# Patient Record
Sex: MALE | Race: WHITE | Employment: FULL TIME | URBAN - METROPOLITAN AREA
[De-identification: names, ages, dates, MRNs, and addresses within clinical notes are randomized per-mention and may not be internally consistent; named-entity substitution may affect disease eponyms.]

---

## 2017-06-29 PROBLEM — R74.8 LOW SERUM HDL: Status: ACTIVE | Noted: 2017-06-29

## 2021-07-23 ENCOUNTER — INITIAL CONSULT (OUTPATIENT)
Dept: BARIATRICS/WEIGHT MGMT | Age: 41
End: 2021-07-23
Payer: COMMERCIAL

## 2021-07-23 ENCOUNTER — TELEPHONE (OUTPATIENT)
Dept: BARIATRICS/WEIGHT MGMT | Age: 41
End: 2021-07-23

## 2021-07-23 VITALS
BODY MASS INDEX: 40.43 KG/M2 | HEIGHT: 74 IN | TEMPERATURE: 97.2 F | HEART RATE: 66 BPM | DIASTOLIC BLOOD PRESSURE: 81 MMHG | RESPIRATION RATE: 20 BRPM | WEIGHT: 315 LBS | SYSTOLIC BLOOD PRESSURE: 156 MMHG

## 2021-07-23 DIAGNOSIS — K30 INDIGESTION: ICD-10-CM

## 2021-07-23 DIAGNOSIS — E66.01 MORBID OBESITY DUE TO EXCESS CALORIES (HCC): Primary | ICD-10-CM

## 2021-07-23 DIAGNOSIS — K21.9 GASTROESOPHAGEAL REFLUX DISEASE WITHOUT ESOPHAGITIS: ICD-10-CM

## 2021-07-23 PROCEDURE — 99204 OFFICE O/P NEW MOD 45 MIN: CPT | Performed by: SURGERY

## 2021-07-23 PROCEDURE — 99202 OFFICE O/P NEW SF 15 MIN: CPT

## 2021-07-23 NOTE — PROGRESS NOTES
Todd Poole  7/23/2021  [facility]    Initial Evaluation  Bariatric Surgery and EGD       Subjective:  CHIEF COMPLAINT: Morbid obesity, malnutrition, Hypertension, GERD and Degenerative Joint Disease (DJD)    HISTORY OF PRESENT ILLNESS: Todd Poole is a morbidly-obese 39 y.o.  male, who weighs (!) 434 lb (196.9 kg). He is 230 pounds over his ideal body weight. The severity is severe with Body mass index is 55.72 kg/m². He has multiple medical problems aggravated by his obesity. They have been overweight since age 22. He wishes to have bariatric surgery so that he can lose a large amount of weight to a goal of their ideal body weight based on height, build and sex, and keep the weight off. I have met with him in the [facility] where we discussed the surgery in great detail and went over the risks and benefits. He has watched our informational video so he understands all of the extensive risks involved. He states that he understands all of the risks and wishes to proceed with the evaluation. We have discussed the different surgical options in detail with use of diagrams and drawings to detail the procedures, risks and alternatives. We discussed the postoperative diet and proposed life long diet for weight management after surgery. They are a nonsmoker  They have no significant exposure to second hand smoke    Past Medical History  Patient Active Problem List   Diagnosis    Irritable bowel syndrome    Obesity    Tobacco use    Prehypertension    Low serum HDL     Past Surgical History  Past Surgical History:   Procedure Laterality Date    COLONOSCOPY  2009    normal      Allergies: Patient has no known allergies. Home Medications  No current outpatient medications on file. No current facility-administered medications for this visit. Social History:   TOBACCO:   reports that he quit smoking about a year ago.  His smoking use included cigarettes. He has a 6.00 pack-year smoking history. He has never used smokeless tobacco.  All smokers must join the free smoking cessation program and stop smoking for 3 months before having any Bariatric surgery. ETOH:    reports no history of alcohol use. The patient has a family history that is negative for severe cardiovascular or respiratory issues, negative for reaction to anesthesia. Review of Systems    A complete 10 system review was performed and are otherwise negative unless mentioned in the above HPI. Specific negatives are listed below but may not include all those reviewed.     General ROS: negative obtundation, AMS  ENT ROS: negative rhinorrhea, epistaxis  Allergy and Immunology ROS: negative itchy/watery eyes or nasal congestion  Hematological and Lymphatic ROS: negative spontaneous bleeding or bruising  Endocrine ROS: negative  lethargy, mood swings, palpitations or polydipsia/polyuria  Respiratory ROS: negative sputum changes, stridor, tachypnea or wheezing  Cardiovascular ROS: negative for - loss of consciousness, murmur or orthopnea  Gastrointestinal ROS: negative for - hematochezia or hematemesis  Genito-Urinary ROS: negative for -  genital discharge or hematuria  Musculoskeletal ROS: negative for - focal weakness, gangrene  Psych/Neuro ROS: negative for - visual or auditory hallucinations, suicidal ideation      Physical Exam:   VITALS: BP (!) 156/81 (Site: Left Lower Arm, Position: Sitting, Cuff Size: Large Adult)   Pulse 66   Temp 97.2 °F (36.2 °C) (Temporal)   Resp 20   Ht 6' 2\" (1.88 m)   Wt (!) 434 lb (196.9 kg)   BMI 55.72 kg/m²   General appearance: AAO, NAD  Eyes: PERRL, EOMI, red conjunctiva  Lungs: equal chest rise bilateral, no wheeze, no rhonchi  Chest wall: atraumatic, no tenderness, no echymosis or abrasions  Heart: reg rate, no murmur  Abdomen: soft, nondistended, nontender, obese  Extremities: full ROM all 4 ext, no gross motor or sensory deficits  Pulses: 2+ distal  Skin: warm and dry  Neurologic: spontanous eye opening, purposeful, follows complex commands      Assessment:  Morbid obesity with inability to keep the weight off with diet and exercise. He is interested in Laparoscopic Devika-en- Y Gastric Bypass or Sleeve Gastrectomy. We discussed that our goal is to ameliorate the medical problems and not to obtain a specific body mass index. He understands the risks and benefits, and wishes to proceed with the evaluation. Plan:  Psych evaluation, medical, gallbladder ultrasound. These patients often have vitamin deficiencies so I will do screening labs for malnutrition. I will do an upper endoscopy to check for hiatal hernias, ulcers and H. Pylori while we wait for insurance approval for the surgery. I have spent over 45min in evaluation of the patient including greater than 50% of that time face to face discussing surgical options, medical and surgical history, plans for medical weight loss management and preoperative clearance for surgery. They did have family present for the discussion and visual aides and drawings were used to explain anatomy and surgical procedures.      Physician Signature: Electronically signed by Dr. Garrison Bettencourt MD    Send copy of H&P to PCP, Sandra Bonner MD

## 2021-07-23 NOTE — TELEPHONE ENCOUNTER
Prior Authorization Form  DEMOGRAPHICS:    Patient Name:  Sarahi Headley  Patient :  1980            Insurance:  Payor: [id number]. / Plan: [id number]. / Product Type: *No Product type* /   Insurance ID Number:    Payor/Plan Subscr  Sex Relation Sub. Ins. ID Effective Group Num   1Marvin Still 1980 Male Self [id number]                                   [id number]         DIAGNOSIS & PROCEDURE:    Procedure/Operation: egd           CPT Code: 33398    Diagnosis:  gerd    ICD10 Code: k21.9    Location:  [facility]    Surgeon:  Alecia Ríos INFORMATION:    Date: 21    Time:               Anesthesia:  MAC/TIVA                                                       Status:  Outpatient        Special Comments:         Electronically signed by Sabrina Medina MA on 2021 at 1:41 PM

## 2021-08-23 NOTE — PROGRESS NOTES
[facility]                                                                                                                    PRE OP INSTRUCTIONS FOR  Bailey Barthel        Date: 8/23/2021    Date of surgery: 08/23/21   Arrival Time: Hospital will call you between 5pm and 7pm with your final arrival time for surgery    1. Do not eat or drink anything after midnight prior to surgery. This includes no water, chewing gum, mints or ice chips. 2. Take the following medications with a small sip of water on the morning of Surgery: None    3. Diabetics may take evening dose of insulin but none after midnight. If you feel symptomatic or low blood sugar morning of surgery drink 1-2 ounces of apple juice only. 4. Aspirin, Ibuprofen, Advil, Naproxen, Vitamin E and other Anti-inflammatory products should be stopped  before surgery  as directed by your physician. Take Tylenol only unless instructed otherwise by your surgeon. 5. Check with your Doctor regarding stopping Plavix, Coumadin, Lovenox, Eliquis, Effient, or other blood thinners. 6. Do not smoke,use illicit drugs and do not drink any alcoholic beverages 24 hours prior to surgery. 7. You may brush your teeth the morning of surgery. DO NOT SWALLOW WATER    8. You MUST make arrangements for a responsible adult to take you home after your surgery. You will not be allowed to leave alone or drive yourself home. It is strongly suggested someone stay with you the first 24 hrs. Your surgery will be cancelled if you do not have a ride home. 9. PEDIATRIC PATIENTS ONLY:  A parent/legal guardian must accompany a child scheduled for surgery and plan to stay at the hospital until the child is discharged. Please do not bring other children with you.     10. Please wear simple, loose fitting clothing to the hospital.  Oumar Byersr not bring valuables (money, credit cards, checkbooks, etc.) Do not wear any makeup (including no eye makeup) or nail polish on your fingers or toes. 11. DO NOT wear any jewelry or piercings on day of surgery. All body piercing jewelry must be removed. 12. Shower the night before surgery with _x__Antibacterial soap /CRISTELA WIPES________    13. TOTAL JOINT REPLACEMENT/HYSTERECTOMY PATIENTS ONLY---Remember to bring Blood Bank bracelet to the hospital on the day of surgery. 14. If you have a Living Will and Durable Power of  for Healthcare, please bring in a copy. 15. If appropriate bring crutches, inspirex, WALKER, CANE etc... 12. Notify your Surgeon if you develop any illness between now and surgery time, cough, cold, fever, sore throat, nausea, vomiting, etc.  Please notify your surgeon if you experience dizziness, shortness of breath or blurred vision between now & the time of your surgery. 17. If you have ___dentures, they will be removed before going to the OR; we will provide you a container. If you wear ___contact lenses or ___glasses, they will be removed; please bring a case for them. 18. To provide excellent care visitors will be limited to 1 in the room at any given time. 19. Please bring picture ID and insurance card. 20. Sleep apnea patients need to bring CPAP AND SETTINGS to hospital on day of surgery. 21. During flu season no children under the age of 15 are permitted in the hospital for the safety of all patients. 22. Other Please check in at main lobby/information desk, please wear a mask. Please call AMBULATORY CARE if you have any further questions.    [address]     [address]

## 2021-08-24 ENCOUNTER — ANESTHESIA EVENT (OUTPATIENT)
Dept: ENDOSCOPY | Age: 41
End: 2021-08-24
Payer: COMMERCIAL

## 2021-08-24 ENCOUNTER — HOSPITAL ENCOUNTER (OUTPATIENT)
Age: 41
Setting detail: OUTPATIENT SURGERY
Discharge: HOME | End: 2021-08-24
Attending: SURGERY | Admitting: SURGERY
Payer: COMMERCIAL

## 2021-08-24 ENCOUNTER — HOSPITAL ENCOUNTER (OUTPATIENT)
Dept: ULTRASOUND IMAGING | Age: 41
Discharge: HOME | End: 2021-08-24
Payer: COMMERCIAL

## 2021-08-24 ENCOUNTER — ANESTHESIA (OUTPATIENT)
Dept: ENDOSCOPY | Age: 41
End: 2021-08-24
Payer: COMMERCIAL

## 2021-08-24 VITALS — RESPIRATION RATE: 19 BRPM | OXYGEN SATURATION: 91 %

## 2021-08-24 VITALS
HEART RATE: 68 BPM | RESPIRATION RATE: 20 BRPM | DIASTOLIC BLOOD PRESSURE: 72 MMHG | WEIGHT: 315 LBS | TEMPERATURE: 97.7 F | SYSTOLIC BLOOD PRESSURE: 141 MMHG | HEIGHT: 74 IN | OXYGEN SATURATION: 97 % | BODY MASS INDEX: 40.43 KG/M2

## 2021-08-24 DIAGNOSIS — E66.01 MORBID OBESITY DUE TO EXCESS CALORIES (HCC): ICD-10-CM

## 2021-08-24 DIAGNOSIS — K30 INDIGESTION: ICD-10-CM

## 2021-08-24 LAB
ALBUMIN SERPL-MCNC: 3.8 G/DL (ref 3.5–5.2)
ALP BLD-CCNC: 80 U/L (ref 40–129)
ALT SERPL-CCNC: 28 U/L (ref 0–40)
ANION GAP SERPL CALCULATED.3IONS-SCNC: 8 MMOL/L (ref 7–16)
AST SERPL-CCNC: 16 U/L (ref 0–39)
BILIRUB SERPL-MCNC: 0.5 MG/DL (ref 0–1.2)
BUN BLDV-MCNC: 13 MG/DL (ref 6–20)
CALCIUM SERPL-MCNC: 9 MG/DL (ref 8.6–10.2)
CHLORIDE BLD-SCNC: 104 MMOL/L (ref 98–107)
CHOLESTEROL, TOTAL: 145 MG/DL (ref 0–199)
CO2: 26 MMOL/L (ref 22–29)
CREAT SERPL-MCNC: 0.9 MG/DL (ref 0.7–1.2)
FERRITIN: 171 NG/ML
FOLATE: 5.8 NG/ML (ref 4.8–24.2)
GFR AFRICAN AMERICAN: >60
GFR NON-AFRICAN AMERICAN: >60 ML/MIN/1.73
GLUCOSE BLD-MCNC: 116 MG/DL (ref 74–99)
HBA1C MFR BLD: 5.6 % (ref 4–5.6)
HCT VFR BLD CALC: 46.5 % (ref 37–54)
HEMOGLOBIN: 15.1 G/DL (ref 12.5–16.5)
MCH RBC QN AUTO: 29.9 PG (ref 26–35)
MCHC RBC AUTO-ENTMCNC: 32.5 % (ref 32–34.5)
MCV RBC AUTO: 92.1 FL (ref 80–99.9)
PDW BLD-RTO: 12.7 FL (ref 11.5–15)
PLATELET # BLD: 231 E9/L (ref 130–450)
PMV BLD AUTO: 10.5 FL (ref 7–12)
POTASSIUM SERPL-SCNC: 4 MMOL/L (ref 3.5–5)
RBC # BLD: 5.05 E12/L (ref 3.8–5.8)
SODIUM BLD-SCNC: 138 MMOL/L (ref 132–146)
TOTAL PROTEIN: 6.6 G/DL (ref 6.4–8.3)
TRIGL SERPL-MCNC: 74 MG/DL (ref 0–149)
VITAMIN B-12: 309 PG/ML (ref 211–946)
WBC # BLD: 6.9 E9/L (ref 4.5–11.5)

## 2021-08-24 PROCEDURE — 82746 ASSAY OF FOLIC ACID SERUM: CPT

## 2021-08-24 PROCEDURE — 76705 ECHO EXAM OF ABDOMEN: CPT

## 2021-08-24 PROCEDURE — 85027 COMPLETE CBC AUTOMATED: CPT

## 2021-08-24 PROCEDURE — 7100000011 HC PHASE II RECOVERY - ADDTL 15 MIN: Performed by: SURGERY

## 2021-08-24 PROCEDURE — 82465 ASSAY BLD/SERUM CHOLESTEROL: CPT

## 2021-08-24 PROCEDURE — 82607 VITAMIN B-12: CPT

## 2021-08-24 PROCEDURE — 2580000003 HC RX 258: Performed by: SURGERY

## 2021-08-24 PROCEDURE — 43239 EGD BIOPSY SINGLE/MULTIPLE: CPT | Performed by: SURGERY

## 2021-08-24 PROCEDURE — 2500000003 HC RX 250 WO HCPCS: Performed by: NURSE ANESTHETIST, CERTIFIED REGISTERED

## 2021-08-24 PROCEDURE — 84478 ASSAY OF TRIGLYCERIDES: CPT

## 2021-08-24 PROCEDURE — 2580000003 HC RX 258: Performed by: NURSE ANESTHETIST, CERTIFIED REGISTERED

## 2021-08-24 PROCEDURE — 88342 IMHCHEM/IMCYTCHM 1ST ANTB: CPT

## 2021-08-24 PROCEDURE — 83036 HEMOGLOBIN GLYCOSYLATED A1C: CPT

## 2021-08-24 PROCEDURE — 3700000000 HC ANESTHESIA ATTENDED CARE: Performed by: SURGERY

## 2021-08-24 PROCEDURE — 84425 ASSAY OF VITAMIN B-1: CPT

## 2021-08-24 PROCEDURE — 3700000001 HC ADD 15 MINUTES (ANESTHESIA): Performed by: SURGERY

## 2021-08-24 PROCEDURE — 80053 COMPREHEN METABOLIC PANEL: CPT

## 2021-08-24 PROCEDURE — 82728 ASSAY OF FERRITIN: CPT

## 2021-08-24 PROCEDURE — 2709999900 HC NON-CHARGEABLE SUPPLY: Performed by: SURGERY

## 2021-08-24 PROCEDURE — 88305 TISSUE EXAM BY PATHOLOGIST: CPT

## 2021-08-24 PROCEDURE — 84630 ASSAY OF ZINC: CPT

## 2021-08-24 PROCEDURE — 3609012400 HC EGD TRANSORAL BIOPSY SINGLE/MULTIPLE: Performed by: SURGERY

## 2021-08-24 PROCEDURE — 6360000002 HC RX W HCPCS: Performed by: NURSE ANESTHETIST, CERTIFIED REGISTERED

## 2021-08-24 PROCEDURE — 7100000010 HC PHASE II RECOVERY - FIRST 15 MIN: Performed by: SURGERY

## 2021-08-24 PROCEDURE — 36415 COLL VENOUS BLD VENIPUNCTURE: CPT

## 2021-08-24 RX ORDER — GLYCOPYRROLATE 0.2 MG/ML
INJECTION INTRAMUSCULAR; INTRAVENOUS PRN
Status: DISCONTINUED | OUTPATIENT
Start: 2021-08-24 | End: 2021-08-24 | Stop reason: SDUPTHER

## 2021-08-24 RX ORDER — SODIUM CHLORIDE 0.9 % (FLUSH) 0.9 %
10 SYRINGE (ML) INJECTION PRN
Status: DISCONTINUED | OUTPATIENT
Start: 2021-08-24 | End: 2021-08-24 | Stop reason: HOSPADM

## 2021-08-24 RX ORDER — SODIUM CHLORIDE 0.9 % (FLUSH) 0.9 %
10 SYRINGE (ML) INJECTION EVERY 12 HOURS SCHEDULED
Status: DISCONTINUED | OUTPATIENT
Start: 2021-08-24 | End: 2021-08-24 | Stop reason: HOSPADM

## 2021-08-24 RX ORDER — SODIUM CHLORIDE 9 MG/ML
INJECTION, SOLUTION INTRAVENOUS CONTINUOUS
Status: DISCONTINUED | OUTPATIENT
Start: 2021-08-24 | End: 2021-08-24 | Stop reason: HOSPADM

## 2021-08-24 RX ORDER — MIDAZOLAM HYDROCHLORIDE 1 MG/ML
INJECTION INTRAMUSCULAR; INTRAVENOUS PRN
Status: DISCONTINUED | OUTPATIENT
Start: 2021-08-24 | End: 2021-08-24 | Stop reason: SDUPTHER

## 2021-08-24 RX ORDER — PROPOFOL 10 MG/ML
INJECTION, EMULSION INTRAVENOUS PRN
Status: DISCONTINUED | OUTPATIENT
Start: 2021-08-24 | End: 2021-08-24 | Stop reason: SDUPTHER

## 2021-08-24 RX ORDER — KETAMINE HYDROCHLORIDE 50 MG/ML
INJECTION, SOLUTION, CONCENTRATE INTRAMUSCULAR; INTRAVENOUS PRN
Status: DISCONTINUED | OUTPATIENT
Start: 2021-08-24 | End: 2021-08-24 | Stop reason: SDUPTHER

## 2021-08-24 RX ORDER — SODIUM CHLORIDE 9 MG/ML
INJECTION, SOLUTION INTRAVENOUS CONTINUOUS PRN
Status: DISCONTINUED | OUTPATIENT
Start: 2021-08-24 | End: 2021-08-24 | Stop reason: SDUPTHER

## 2021-08-24 RX ORDER — SODIUM CHLORIDE 9 MG/ML
25 INJECTION, SOLUTION INTRAVENOUS PRN
Status: DISCONTINUED | OUTPATIENT
Start: 2021-08-24 | End: 2021-08-24 | Stop reason: HOSPADM

## 2021-08-24 RX ADMIN — KETAMINE HYDROCHLORIDE 20 MG: 50 INJECTION INTRAMUSCULAR; INTRAVENOUS at 09:03

## 2021-08-24 RX ADMIN — GLYCOPYRROLATE 0.2 MG: 0.2 INJECTION, SOLUTION INTRAMUSCULAR; INTRAVENOUS at 08:50

## 2021-08-24 RX ADMIN — SODIUM CHLORIDE: 9 INJECTION, SOLUTION INTRAVENOUS at 08:50

## 2021-08-24 RX ADMIN — SODIUM CHLORIDE: 9 INJECTION, SOLUTION INTRAVENOUS at 07:51

## 2021-08-24 RX ADMIN — PROPOFOL 50 MG: 10 INJECTION, EMULSION INTRAVENOUS at 09:03

## 2021-08-24 RX ADMIN — MIDAZOLAM 2 MG: 1 INJECTION INTRAMUSCULAR; INTRAVENOUS at 09:03

## 2021-08-24 ASSESSMENT — PAIN SCALES - GENERAL
PAINLEVEL_OUTOF10: 0
PAINLEVEL_OUTOF10: 0

## 2021-08-24 ASSESSMENT — LIFESTYLE VARIABLES: SMOKING_STATUS: 0

## 2021-08-24 NOTE — ANESTHESIA POSTPROCEDURE EVALUATION
Department of Anesthesiology  Postprocedure Note    Patient: Moriah Land  MRN: [id number]  YOB: 1980  Date of evaluation: 8/24/2021  Time:  12:10 PM     Procedure Summary     Date: 08/24/21 Room / Location: [address] [place] [address] / [address] [place] [address]    Anesthesia Start: 8801 Anesthesia Stop: 9468    Procedure: EGD BIOPSY (N/A ) Diagnosis: (GERD)    Surgeons: Carito White MD Responsible Provider: Armand Yepez MD    Anesthesia Type: MAC ASA Status: 3          Anesthesia Type: MAC    Orestes Phase I: Orestes Score: 10    Orestes Phase II: Orestes Score: 10    Last vitals: Reviewed and per EMR flowsheets.        Anesthesia Post Evaluation    Patient location during evaluation: PACU  Patient participation: complete - patient participated  Level of consciousness: awake  Airway patency: patent  Nausea & Vomiting: no nausea and no vomiting  Complications: no  Cardiovascular status: hemodynamically stable  Respiratory status: acceptable  Hydration status: stable

## 2021-08-24 NOTE — H&P
Anselmo Foy  8/24/2021  [facility]    Initial Evaluation  Bariatric Surgery and EGD       Subjective:  CHIEF COMPLAINT: Morbid obesity, malnutrition, Hypertension, GERD and Degenerative Joint Disease (DJD)    HISTORY OF PRESENT ILLNESS: Anselmo Foy is a morbidly-obese 39 y.o.  male, who weighs (!) 434 lb (196.9 kg). He is 230 pounds over his ideal body weight. The severity is severe with Body mass index is 56.13 kg/m². He has multiple medical problems aggravated by his obesity. They have been overweight since age 22. He wishes to have bariatric surgery so that he can lose a large amount of weight to a goal of their ideal body weight based on height, build and sex, and keep the weight off. I have met with him in the [facility] where we discussed the surgery in great detail and went over the risks and benefits. He has watched our informational video so he understands all of the extensive risks involved. He states that he understands all of the risks and wishes to proceed with the evaluation. We have discussed the different surgical options in detail with use of diagrams and drawings to detail the procedures, risks and alternatives. We discussed the postoperative diet and proposed life long diet for weight management after surgery. They are a nonsmoker  They have no significant exposure to second hand smoke    Past Medical History  Patient Active Problem List   Diagnosis    Irritable bowel syndrome    Obesity    Tobacco use    Prehypertension    Low serum HDL     Past Surgical History  Past Surgical History:   Procedure Laterality Date    COLONOSCOPY  2009    normal      Allergies: Patient has no known allergies.      Home Medications  Current Facility-Administered Medications   Medication Dose Route Frequency Provider Last Rate Last Admin    0.9 % sodium chloride infusion   Intravenous Continuous Paulina Primrose, MD        sodium chloride flush 0.9 % injection 10 mL  10 mL Intravenous 2 times per day Ignacio Dempsey MD        sodium chloride flush 0.9 % injection 10 mL  10 mL Intravenous PRN Ignacio Dempsey MD        0.9 % sodium chloride infusion  25 mL Intravenous PRN Ignacio Dempsey MD           Social History:   TOBACCO:   reports that he quit smoking about 12 months ago. His smoking use included cigarettes. He has a 6.00 pack-year smoking history. He has never used smokeless tobacco.  All smokers must join the free smoking cessation program and stop smoking for 3 months before having any Bariatric surgery. ETOH:    reports no history of alcohol use. The patient has a family history that is negative for severe cardiovascular or respiratory issues, negative for reaction to anesthesia. Review of Systems    A complete 10 system review was performed and are otherwise negative unless mentioned in the above HPI. Specific negatives are listed below but may not include all those reviewed.     General ROS: negative obtundation, AMS  ENT ROS: negative rhinorrhea, epistaxis  Allergy and Immunology ROS: negative itchy/watery eyes or nasal congestion  Hematological and Lymphatic ROS: negative spontaneous bleeding or bruising  Endocrine ROS: negative  lethargy, mood swings, palpitations or polydipsia/polyuria  Respiratory ROS: negative sputum changes, stridor, tachypnea or wheezing  Cardiovascular ROS: negative for - loss of consciousness, murmur or orthopnea  Gastrointestinal ROS: negative for - hematochezia or hematemesis  Genito-Urinary ROS: negative for -  genital discharge or hematuria  Musculoskeletal ROS: negative for - focal weakness, gangrene  Psych/Neuro ROS: negative for - visual or auditory hallucinations, suicidal ideation      Physical Exam:   VITALS: /88   Pulse 61   Temp 97.2 °F (36.2 °C) (Temporal)   Resp 16   Ht 6' 2\" (1.88 m)   Wt (!) 437 lb 3.2 oz (198.3 kg)   SpO2 95%   BMI 56.13 kg/m²   General appearance: AAO, NAD  Eyes: PERRL, EOMI, red conjunctiva  Lungs: equal chest rise bilateral, no wheeze, no rhonchi  Chest wall: atraumatic, no tenderness, no echymosis or abrasions  Heart: reg rate, no murmur  Abdomen: soft, nondistended, nontender, obese  Extremities: full ROM all 4 ext, no gross motor or sensory deficits  Pulses: 2+ distal  Skin: warm and dry  Neurologic: spontanous eye opening, purposeful, follows complex commands      Assessment:  Morbid obesity with inability to keep the weight off with diet and exercise. He is interested in Laparoscopic Devika-en- Y Gastric Bypass or Sleeve Gastrectomy. We discussed that our goal is to ameliorate the medical problems and not to obtain a specific body mass index. He understands the risks and benefits, and wishes to proceed with the evaluation. Plan:  Psych evaluation, medical, gallbladder ultrasound. These patients often have vitamin deficiencies so I will do screening labs for malnutrition. I will do an upper endoscopy to check for hiatal hernias, ulcers and H. Pylori while we wait for insurance approval for the surgery. I have spent over 45min in evaluation of the patient including greater than 50% of that time face to face discussing surgical options, medical and surgical history, plans for medical weight loss management and preoperative clearance for surgery. They did have family present for the discussion and visual aides and drawings were used to explain anatomy and surgical procedures.      Physician Signature: Electronically signed by Chiara Ruelas MD    Send copy of H&P to PCP, Sandra Crisostomo DO

## 2021-08-24 NOTE — ANESTHESIA PRE PROCEDURE
Department of Anesthesiology  Preprocedure Note       Name:  Ashanti Chavez   Age:  39 y.o.  :  1980                                          MRN:  [id number]         Date:  2021      Surgeon: Delfino Busby):  Sharee Shipman MD    Procedure: Procedure(s):  EGD ESOPHAGOGASTRODUODENOSCOPY    Medications prior to admission:   Prior to Admission medications    Not on File       Current medications:    No current facility-administered medications for this encounter. Allergies:  No Known Allergies    Problem List:    Patient Active Problem List   Diagnosis Code    Irritable bowel syndrome K58.9    Obesity E66.9    Tobacco use Z72.0    Prehypertension R03.0    Low serum HDL R74.8       Past Medical History:        Diagnosis Date    Irritable bowel syndrome     Morbid (severe) obesity due to excess calories (HCC)     Obesity     Prehypertension     Tobacco use        Past Surgical History:        Procedure Laterality Date    COLONOSCOPY      normal       Social History:    Social History     Tobacco Use    Smoking status: Former Smoker     Packs/day: 0.50     Years: 12.00     Pack years: 6.00     Types: Cigarettes     Quit date: 2020     Years since quittin.0    Smokeless tobacco: Never Used   Substance Use Topics    Alcohol use: No                                Counseling given: Not Answered      Vital Signs (Current): There were no vitals filed for this visit.                                            BP Readings from Last 3 Encounters:   21 (!) 156/81   18 118/82   17 115/84       NPO Status:                                                                                 BMI:   Wt Readings from Last 3 Encounters:   21 (!) 434 lb (196.9 kg)   18 (!) 382 lb (173.3 kg)   17 (!) 382 lb (173.3 kg)     There is no height or weight on file to calculate BMI.    CBC: No results found for: WBC, RBC, HGB, HCT, MCV, RDW, PLT    CMP:   Lab Results   Component Value Date     06/23/2017    K 4.4 06/23/2017     06/23/2017    CO2 27 06/23/2017    BUN 11 06/23/2017    CREATININE 0.9 06/23/2017    GFRAA >60 06/23/2017    LABGLOM >60 06/23/2017    GLUCOSE 107 06/23/2017    GLUCOSE 93 07/06/2011    PROT 7.3 06/23/2017    CALCIUM 9.4 06/23/2017    BILITOT 0.8 06/23/2017    ALKPHOS 78 06/23/2017    AST 19 06/23/2017    ALT 23 06/23/2017       POC Tests: No results for input(s): POCGLU, POCNA, POCK, POCCL, POCBUN, POCHEMO, POCHCT in the last 72 hours. Coags: No results found for: PROTIME, INR, APTT    HCG (If Applicable): No results found for: PREGTESTUR, PREGSERUM, HCG, HCGQUANT     ABGs: No results found for: PHART, PO2ART, OMX8YNX, FVI7UDH, BEART, C9LGHMZS     Type & Screen (If Applicable):  No results found for: LABABO, LABRH    Drug/Infectious Status (If Applicable):  No results found for: HIV, HEPCAB    COVID-19 Screening (If Applicable): No results found for: COVID19        Anesthesia Evaluation  Patient summary reviewed no history of anesthetic complications:   Airway: Mallampati: III  TM distance: >3 FB   Neck ROM: full  Mouth opening: > = 3 FB Dental: normal exam         Pulmonary: breath sounds clear to auscultation      (-) not a current smoker                           Cardiovascular:Negative CV ROS              Rate: normal                    Neuro/Psych:   Negative Neuro/Psych ROS              GI/Hepatic/Renal:   (+) GERD:, morbid obesity         ROS comment: Irritable bowel syndrome. Endo/Other: Negative Endo/Other ROS                    Abdominal:   (+) obese,           Vascular: negative vascular ROS. Other Findings:             Anesthesia Plan      MAC     ASA 3       Induction: intravenous. Anesthetic plan and risks discussed with patient. Plan discussed with CRNA.                   Michoacano Chavez MD   8/24/2021

## 2021-08-24 NOTE — OP NOTE
[address] [place] [address] [facility]           Operative Report    DATE OF PROCEDURE: 8/24/2021    Lexiifabrizio Hartman    SURGEON: Mel Carmen MD.     ASSISTANT: none    PREOPERATIVE DIAGNOSES:  GERD    POSTOPERATIVE DIAGNOSES:   (1) no Hiatal Hernia  (2) No Esophagitis  (3) no Gastritis    OPERATION: Dfabjban-taptmr-[id number] with antral biopsies    ANESTHESIA: LMAC    EBL: None    COMPLICATIONS: None. History and consent: This is a 39y.o. year old male who is having GERD. I have discussed with the patient the indication, alternatives, and the possible risks and/or complications of upper endoscopy and the conscious sedation anesthesia. The patient and/or family understands and agrees to proceed. Monitoring and Safety:    The patient was placed on a cardiac monitor and vital signs, pulse oximetry and level of consciousness were continuously evaluated throughout the procedure. The patient was closely monitored until recovery from the medications was complete and the patient had returned to baseline status. Anesthesia was present at all times during the procedure. OPERATIONS: The patient was placed on the table and sedated while blood pressure, pulse and pulse oximetry were continuously monitored by the anesthesia team. A bite block was placed prior to sedation and the patient was placed in the left lateral decubitus position. A lubricated scope was easily passed into the upper esophagus which looked normal. The distal esophagus looked normal. The scope was passed into the stomach and retroflexed. The gastroesophageal junction was at 41cm. There was no hiatal hernia. The scope was passed down toward the pylorus. The antral mucosa looked normal. Biopsy was taken to check for H. pylori.  The scope was then passed through the pylorus into the duodenal bulb which looked normal, then around to the distal duodenum which looked normal, and the scope was then withdrawn. The patient tolerated the procedure well.        Diet: Low fat, low calorie    PLAN:  (1) Follow up in office to discuss pathology, imaging, labs      Further Procedures:  Surgical weight loss pending approval    Physician Signature: Electronically signed by Dr. Anita Woods

## 2021-08-28 LAB
VITAMIN B1 WHOLE BLOOD: 156 NMOL/L (ref 70–180)
ZINC: 75.3 UG/DL (ref 60–120)

## 2021-08-31 ENCOUNTER — INITIAL CONSULT (OUTPATIENT)
Dept: BARIATRICS/WEIGHT MGMT | Age: 41
End: 2021-08-31
Payer: COMMERCIAL

## 2021-08-31 VITALS — HEIGHT: 74 IN | WEIGHT: 315 LBS | BODY MASS INDEX: 40.43 KG/M2

## 2021-08-31 DIAGNOSIS — E66.01 MORBID OBESITY DUE TO EXCESS CALORIES (HCC): ICD-10-CM

## 2021-08-31 DIAGNOSIS — Z71.3 DIETARY COUNSELING: Primary | ICD-10-CM

## 2021-08-31 PROCEDURE — 99999 PR OFFICE/OUTPT VISIT,PROCEDURE ONLY: CPT

## 2021-08-31 NOTE — PROGRESS NOTES
[facility]:  Initial Nutrition Consultation    Today's Date: 8/31/2021  Patients Name:James Hartman     Height: 6' 2\" (1.88 m)   Weight: (!) 436 lb (197.8 kg)   IBW: 197 lbs   %IBW: 221 %  Excess Weight: 239 lbs   BMI: Body mass index is 55.98 kg/m². Subjective Information:   Patient has tried multiple means of weight loss including diet and exercise in the past and has been unable to maintain a healthy weight. Pt states he / she has tried the following Noom, reduced calorie w/ counting. Patients overall goal is to be able to lose weight with diet and exercise by changing lifestyle, habits and maintain a healthy weight for a lifetime. Are your currently Diabetic no  Pre-diabetic no meds    Last Blood Glucose Reading   116 H on 8/24/21   Last HGBA1C 5.6 WNL on 8/24/21    Patient states the following will be the most difficult change after weight loss surgery? Consuming 64 oz of water daily    Most successful diet in the past? Cutting out bread and other high carbohydrate foods  Length of time patient was on the diet listed above? 1-1.5 years  Weight lost on the diet listed above? 60 lbs   Patient stated he / she maintained his / her weight for the following time? 1-1.5 years       Patient takes the following vitamins, minerals and dietary supplements? none    Patient consumes the following beverage daily? Coffee    8/24/21 Pre-Op Labs  Hgb 15.1 WNL, Hct 46.5 WNL, Ferritin 171 WNL      Patient states he / she has the following problems:  no - Eating  no - Chewing  no - Swallowing  no - Nausea  no - Vomiting  yes - Diarrhea    Occasionally  no - Constipation  no - Hair Changes  no - Skin Changes  no - Tongue Texture    Food Allergies: no    Food Intolerances: yes - Lactose intolerance (pt avoids milk and excessive ice cream)    Yes - Past medically assisted weight loss history reviewed.   Yes - Provided education regarding the basic role of nutrition in junction with Bariatric Surgery. Yes - Provided overview of required dietary and behavioral changes pre and post operatively. Yes - Stated / reinforced that changes in dietary behaviors are critical to successful, long term weight Loss. Patient is aware that in order to proceed with bariatric surgery he / she will need to follow a low-fat diet prior to surgery. Patient is aware that bariatric surgery is a lifetime change that will require the patient to follow a low-fat, low-calorie, low-sugar, portion controlled diet with at least 30 minutes of physical activity daily. Patient is aware that certain foods may not be tolerated after bariatric surgery and certain foods will need avoided all together. [address] / LD feels that this patient knowledge / readiness to make appropriate diet / lifestyle changes assessed to be? - Good    RD / LD feels this patients expected adherence for post surgical diet? - Good    Patient was instructed and signed consents on a low-fat diet and required supplementation at initial consult. Comments: Patient is able to verbalize diet concepts for bariatric surgery. Patient is aware diet concepts will be reinforced at 2-hour nutrition education consult. RD / LD will monitor progress.

## 2021-08-31 NOTE — PATIENT INSTRUCTIONS
[facility]  Dietary Initial Appointment Patient Instructions    By: Rina Mcnulty RD / LD  [phone]      At your initial dietary appointment you have received the following information packets:    Please be aware at each visit you have been instructed that in order for your insurance company to approve your surgery you must show a consistent weight loss of 2 lbs or greater at each visit. We can not guarantee an approval by your insurance company we can only provide the information given to us it is up to you the patient to show compliancy to your insurance company. If you do gain weight during your supervised weight loss counseling sessions insurance companies starting in 2018 are denying patients for not showing consistent weight loss results when part of a supervised weight loss counseling program. Pt was instructed on 8/31/21. Pt verbalized understanding. · Low-Fat Diet  - Please be sure to begin your low-fat diet from today's date until your scheduled surgery date. If you are not at goal weight by your history and physical appointment with your surgeon your surgery will be cancelled. · Goal Weight: 411 lbs (BMI of 52. 7)  · Low-Fat Diet Contract - Patient Acknowledge and Signed  · Supplement List - Please be sure to be saving to purchase your 3 month supply of supplements. If you do not bring supplements to your history and physical appointment your surgery will be cancelled. · Supplement Contract - Patient Acknowledge and Signed  · Please be sure to take a daily Multi-vitamin from now until surgery to reduce your incidence of infection. · If your insurance company requires additional dietary counseling you will be scheduled to see the dietitian the following month. ·  If your psychological evaluation is completed and all your dietary requirements for your individual insurance company have been completed you will be submitted to insurance.  Please make sure to check with us to see if we have received your psychological evaluation. Please be aware that any co-pays or deductibles may be requested prior to testing and / or procedures. You will need to schedule a psychological evaluation for weight loss surgery. Patients will be required to complete all psychological testing as required by the psychologist. Patients must follow all of the psychologist's recommendations before weight loss surgery can be scheduled. The evaluation must be done a standard way for weight loss surgery. We strongly recommend that you contact one of our preferred providers listed below to arrange this:    Donna Sethi, [address] [facility]                                                              [address] [place] [address]                                                                                      [phone]     Ioana Velásquez [address] [place] [address]                                                                                                [phone]        Dr. Laura Raymond, PhD                         [facility]. [place]                                                                                              [phone]     You will also need to plan on attending a 2 hour nutrition class at the [facility] prior to your surgery. We will schedule this for you when we schedule your surgery. Please remember to have your labs drawn prior to your scheduled appointment to review the results of your testing. Please remember, that while we will submit your case to insurance for surgery authorization, it is your responsibility to know if your plan covers weight loss surgery and keep up-to-date with changes to your insurance coverage.   We will do everything possible to help you get approved for weight loss surgery, but cannot guarantee an approval. Please note that you will not be submitted to your insurance company until all   pre-operative testing requirements are met. · Please remember you need to schedule to attend one Support Group meeting held at the [facility] before surgery. This one meeting is mandatory. You can attend as many support group meetings before and after surgery. Support group meetings are held at the [facility] from 6:00 - 8:00pm 1st, and 3rd Tuesday of every month. See dates listed below. · Each individual person has his / her own medical requirements that need fulfilled before being able to schedule bariatric surgery . Please finalize these requirements by contacting our Bariatric Nurse at [phone].

## 2021-09-10 ENCOUNTER — INITIAL CONSULT (OUTPATIENT)
Dept: BARIATRICS/WEIGHT MGMT | Age: 41
End: 2021-09-10
Payer: COMMERCIAL

## 2021-09-10 ENCOUNTER — VIRTUAL VISIT (OUTPATIENT)
Dept: BARIATRICS/WEIGHT MGMT | Age: 41
End: 2021-09-10
Payer: COMMERCIAL

## 2021-09-10 VITALS — BODY MASS INDEX: 40.43 KG/M2 | WEIGHT: 315 LBS | HEIGHT: 74 IN

## 2021-09-10 DIAGNOSIS — E66.01 MORBID OBESITY DUE TO EXCESS CALORIES (HCC): ICD-10-CM

## 2021-09-10 DIAGNOSIS — Z71.3 DIETARY COUNSELING: Primary | ICD-10-CM

## 2021-09-10 DIAGNOSIS — E66.01 MORBID OBESITY DUE TO EXCESS CALORIES (HCC): Primary | ICD-10-CM

## 2021-09-10 DIAGNOSIS — K76.0 FATTY LIVER: ICD-10-CM

## 2021-09-10 DIAGNOSIS — Z01.818 PRE-OP EVALUATION: ICD-10-CM

## 2021-09-10 PROCEDURE — 99212 OFFICE O/P EST SF 10 MIN: CPT | Performed by: SURGERY

## 2021-09-10 PROCEDURE — 99999 PR OFFICE/OUTPT VISIT,PROCEDURE ONLY: CPT

## 2021-09-10 NOTE — PROGRESS NOTES
are not underlined. General: chills, fatigue, fever, malaise, night sweats, weight gain,  weight loss. Psychological: anxiety, depression, suicidal ideation, sleep disturbances, behavioral disorder, difficulty concentrating, disorientation, hallucinations, mood swings, obsessive thoughts, physical abuse,  sexual abuse. Ophthalmic: blurry vision, decreased vision, double vision, loss of vision, photophobia, use of corrective device. Ear Nose Throat: hearing loss, tinnitus, phonophobia, sensitivity to smells, vertigo, or vocal changes. Allergy/Immunology: seasonal allergies, watery eyes, itchy eyes, frequent infections. Hematological and Lymphatic: bleeding problems, blood clots, bruising,  yellowing of the skin, swollen lymph nodes. Endocrine:  polydypsia, polyuria, temperature intolerance. Respiratory: cough, shortness of breath, wheezing. Cardiovascular: syncope, chest pain, dyspnea on exertion, edema, irregular heartbeat,  palpitations. Gastrointestinal: abdominal pain, constipation, diarrhea,  decreased appetite, heartburn, hematemesis, melena, nausea, vomiting, stool incontinence, abnormal swallowing. Genito-Urinary: dysuria, hematuria, incontinence, frequency, urgency. Musculoskeletal: joint pain, stiffness, swelling, muscle pain, muscle  tenderness. Neurological: confusion, memory loss, dizziness, gait disturbance, headaches, impaired coordination, decreased balance, numbness/tingling, seizures, speech problems, tremors,weakness. Dermatological:  hair changes, nail changes, pruritu      Physical Exam: Respiratory rate was: normal   General: The patient is in no apparent distress. Body habitus is obese. HEENT: No rhinorrhea, sneezing, yawning, or lacrimation. No scleral icterus or conjunctival injection. SKIN: No piloerection. No track marks. No rash. No erythema or ecchymosis. Psychological: Mood and affect are appropriate. Hygiene appears appropriate.    Cardiovascular: There is no edema. Respiratory: Respirations are regular and unlabored. There is no cyanosis. Gastrointestinal: Soft abdomen, non-tender. Genitourinary: No costovertebral angle tenderness. MSK: Spinal curvatures were normal in standing. Pelvis was level in standing. Active range of motion was full. There was no obvious joint effusion, deformity. Neurologic: Awake, alert and oriented in three planes. Speech is fluent. No facial weakness. Tongue is midline. Hearing is intact for conversation. Pupils are equal and round. Extraocular muscles appear intact during visual tracking. Shoulder shrug symmetric. Sensation: Intact for light touch (patient elicited) in all upper and lower extremity dermatomes. Strength: Functional upper extremity strength testing was observed to be at least antigravity and lower extremity strength testing including heel and toe walking as well as duck walking were intact, unable to manual muscle test given environment. There was no observable atrophy or side to size difference in muscle bulk. Muscle Tendon Reflexes: unable to test given environment. Gait is Normal.   Romberg is negative. No observed abnormal muscle tone. The patient was able to rise from a chair and squat without difficulty. There is no tremor. Due to this being a TeleHealth encounter, evaluation of the following organ systems is limited: Vitals/Constitutional/EENT/Resp/CV/GI//MS/Neuro/Skin/Heme-Lymph-Imm. Assessment:    Moriah Land is a 39 y.o. male who is being evaluated for weight loss surgery. Currently undergoing medical weight loss management. Recently completed labs, RUQ US and Endoscopy with findings of normal. Vitamin deficiency. Plan:   Stay on the high protein, low calorie diet while waiting for insurance approval. Walk as much as possible but keep your legs elevated when sitting. Continue deep breathing exercizes. Aim for 60 gm Protein and 90 oz of liquids per day.   Track protein and fluid intake. Make sure the bowel move daily by taking fiber such as Metamucil. We discussed results and surgery for over 15 minutes. Cont medical weight loss counseling and pursue medical, cardiac clearance as well as Psychological evaluation. Plans to pursue Laparoscopic Devika-en-Y gastric bypass or Sleeve gastrectomy.     Vit D supplementation for 3 months    No referral for Cardiac clearance necessary based on medical history and exam      Physician Signature: Electronically signed by Dr. Freddy Felix MD   Cc:  Genoveva Butterfield,

## 2021-09-10 NOTE — PROGRESS NOTES
WEIGHT:  Weight: (!) 430 lb (195 kg)      Pt was in th office for his/her 2nd weight Loss appointment. Pt is aware he/she must meet his/her pre-op weight loss goal in order to proceed with weight loss surgery. James / Joshua faxed a copy of what was reviewed with the pt to her PCP. Pt verbalized understanding. Rd// Joshua enc the following at today's visit for successful post-surgical Weight Maintenance    1. Weigh yourself daily and record it. 2. Keep documented food records daily   3. Just be more active in day to day routine   4. Higher protein intake and a higher fiber intake. Not a high protein or a high fiber diet just a higher intake. 5.Eliminate empty calorie consumption    James Lewis addressed the following with the pt:  - James Lewis enc pt to comply with nutrition recommendations  - James / Joshua enc Participation in support group meetings  - James Lewis enc pt to go back to maintenance of food records and weight monitoring records   - James Lewis reviewed the importance of adequate sleep and stress management  - James Lewis reviewed nonfood strategies to cope with emotions and stress  - James Lewis encouraged pt to practice the following: Mindful eating: Eating slowly: Focusing   on the eating experience without distraction  - James Lewis enc. pt to pay attention to hunger and fullness  - Rd / Ld enc meal planning  - James Caro pt to chose nutrient dense whole foods instead of soft, high calorie foods  - James / Ld enc not dr Marisela Sabillon large amounts of fluids with or immediately after meals    Portion control ,meal planning and avoiding empty calorie consumption. James / Ld reviewed insurance company weight loss requirement on 9/10/21. Pt verbalized understanding. Please be aware at each visit you have been instructed that in order for your insurance company to approve your surgery you must show a consistent weight loss of 2 lbs or greater at each visit.  We can not guarantee an approval by your insurance company we can only provide the information given to us it is up to you the patient to show compliancy to your insurance company.   If you do gain weight during your supervised weight loss counseling sessions insurance companies starting in 2018 are denying patients for not showing consistent weight loss results when part of a supervised weight loss counseling program.

## 2021-09-16 ENCOUNTER — VIRTUAL VISIT (OUTPATIENT)
Dept: BARIATRICS/WEIGHT MGMT | Age: 41
End: 2021-09-16
Payer: COMMERCIAL

## 2021-09-16 DIAGNOSIS — Z71.3 DIETARY COUNSELING: Primary | ICD-10-CM

## 2021-09-16 DIAGNOSIS — E66.01 MORBID OBESITY DUE TO EXCESS CALORIES (HCC): ICD-10-CM

## 2021-09-16 PROCEDURE — 99999 PR OFFICE/OUTPT VISIT,PROCEDURE ONLY: CPT

## 2021-09-16 NOTE — PROGRESS NOTES
WEIGHT:  428 lbs     Pt was in th office for his/her 3rd weight Loss appointment. Pt is aware he/she must meet his/her pre-op weight loss goal in order to proceed with weight loss surgery. James / Joshua faxed a copy of what was reviewed with the pt to her PCP. Pt verbalized understanding. James// Joshua enc the following at today's visit for successful post-surgical Weight Maintenance    1. Weigh yourself daily and record it. 2. Keep documented food records daily   3. Just be more active in day to day routine   4. Higher protein intake and a higher fiber intake. Not a high protein or a high fiber diet just a higher intake. 5.Eliminate empty calorie consumption    James Lewis addressed the following with the pt:  - Rd  Ld enc pt to comply with nutrition recommendations  - James / Ld enc Participation in support group meetings  - Rd Ld enc pt to go back to maintenance of food records and weight monitoring records   - James Lewis reviewed the importance of adequate sleep and stress management  - James Lewis reviewed nonfood strategies to cope with emotions and stress  - James Lewis encouraged pt to practice the following: Mindful eating: Eating slowly: Focusing   on the eating experience without distraction  - James Lewis enc. pt to pay attention to hunger and fullness  - James / Joshua enc meal planning  - James Whitehead pt to chose nutrient dense whole foods instead of soft, high calorie foods  - James / Joshua enc not dr Abraham Reins large amounts of fluids with or immediately after meals    Portion control ,meal planning and avoiding empty calorie consumption. James / Joshua reviewed insurance company weight loss requirement on 9/16/21. Pt verbalized understanding. Please be aware at each visit you have been instructed that in order for your insurance company to approve your surgery you must show a consistent weight loss of 2 lbs or greater at each visit.  We can not guarantee an approval by your insurance company we can only provide the information given to us it is up to you the patient to show compliancy to your insurance company.   If you do gain weight during your supervised weight loss counseling sessions insurance companies starting in 2018 are denying patients for not showing consistent weight loss results when part of a supervised weight loss counseling program.

## 2021-09-20 ENCOUNTER — OFFICE VISIT (OUTPATIENT)
Dept: CARDIOLOGY CLINIC | Age: 41
End: 2021-09-20
Payer: COMMERCIAL

## 2021-09-20 VITALS
OXYGEN SATURATION: 96 % | DIASTOLIC BLOOD PRESSURE: 78 MMHG | SYSTOLIC BLOOD PRESSURE: 132 MMHG | HEIGHT: 74 IN | HEART RATE: 63 BPM | RESPIRATION RATE: 16 BRPM | BODY MASS INDEX: 40.43 KG/M2 | WEIGHT: 315 LBS

## 2021-09-20 DIAGNOSIS — Z01.810 PREOP CARDIOVASCULAR EXAM: Primary | ICD-10-CM

## 2021-09-20 PROCEDURE — 99202 OFFICE O/P NEW SF 15 MIN: CPT | Performed by: INTERNAL MEDICINE

## 2021-09-20 PROCEDURE — 93000 ELECTROCARDIOGRAM COMPLETE: CPT | Performed by: INTERNAL MEDICINE

## 2021-09-20 RX ORDER — OMEPRAZOLE 10 MG/1
10 CAPSULE, DELAYED RELEASE ORAL DAILY PRN
COMMUNITY
End: 2021-12-08

## 2021-09-20 RX ORDER — VITAMIN B COMPLEX
1000 TABLET ORAL DAILY
COMMUNITY
End: 2022-02-11

## 2021-09-20 ASSESSMENT — ENCOUNTER SYMPTOMS
DIARRHEA: 1
BACK PAIN: 0
NAUSEA: 0
CONSTIPATION: 0
COUGH: 0
WHEEZING: 0
BLOOD IN STOOL: 0
ABDOMINAL PAIN: 0
VOMITING: 0
SHORTNESS OF BREATH: 0

## 2021-09-20 NOTE — PROGRESS NOTES
OUTPATIENT CARDIOLOGY CONSULT    Name: Boone Francisco    Age: 39 y.o. Date of Service: 9/20/2021    Reason for Consultation:   Chief Complaint   Patient presents with    Cardiac Clearance     Consult, per Dr Andrade Cortez, to obtain cardiac clearance for bariatric surgery. Labs 8/24/21. No cardiac complaints. Referring Physician: Tanika Priest DO    History of Present Illness:  77-year-old morbidly obese ex smoker male who is seen today in consultation for preop clearance prior to bariatric surgery. He has history of prehypertension, low HDL and irritable bowel syndrome. Patient smoking a year ago. He used to smoke 1 pack a day for 20 years. He has no family history for premature CAD. He works as a manager at [employer]. He is active. He has been walking 2 miles a day and can go up couple of flights of stairs with no chest discomfort or shortness of breath. He denies palpitations, dizziness or syncope. Denies orthopnea, PND or lower extremity edema. EKG done today revealed sinus rhythm at 63 bpm, left atrial enlargement, left anterior fascicular block and incomplete right bundle branch block. Review of Systems:  Review of Systems   Constitutional: Negative for chills, fatigue and fever. HENT: Negative for nosebleeds. Respiratory: Negative for cough, shortness of breath and wheezing. Patient denies snoring or gasping for air at night. Gastrointestinal: Positive for diarrhea. Negative for abdominal pain, blood in stool, constipation, nausea and vomiting. Heartburn. Genitourinary: Negative for dysuria and hematuria. Musculoskeletal: Negative for back pain, joint swelling and myalgias. Aching. Neurological: Negative for syncope and light-headedness. Psychiatric/Behavioral: The patient is not nervous/anxious.            Past Medical History:  Past Medical History:   Diagnosis Date    Irritable bowel syndrome     Morbid (severe) obesity due to excess calories ([address]     Obesity     Prehypertension     Tobacco use        Past Surgical History:  Past Surgical History:   Procedure Laterality Date    COLONOSCOPY  2009    normal    UPPER GASTROINTESTINAL ENDOSCOPY N/A 2021    EGD BIOPSY performed by Mable Hernandez MD at [facility] END[facility]COPY       Family History:  Family History   Problem Relation Age of Onset    Diabetes Mother     High Blood Pressure Mother     Diabetes Father     Other Father         aneurysm       Social History:  Social History     Socioeconomic History    Marital status:      Spouse name: Not on file    Number of children: Not on file    Years of education: Not on file    Highest education level: Not on file   Occupational History    Not on file   Tobacco Use    Smoking status: Former Smoker     Packs/day: 0.50     Years: 12.00     Pack years: 6.00     Types: Cigarettes     Quit date: 2020     Years since quittin.1    Smokeless tobacco: Never Used   Vaping Use    Vaping Use: Former    Substances: Nicotine   Substance and Sexual Activity    Alcohol use: Not Currently    Drug use: Never    Sexual activity: Yes     Partners: Female   Other Topics Concern    Not on file   Social History Narrative    Drinks 2-3 cups of coffee daily. Social Determinants of Health     Financial Resource Strain:     Difficulty of Paying Living Expenses:    Food Insecurity:     Worried About Running Out of Food in the Last Year:     [address] in the Last Year:    Transportation Needs:     Lack of Transportation (Medical):      Lack of Transportation (Non-Medical):    Physical Activity:     Days of Exercise per Week:     Minutes of Exercise per Session:    Stress:     Feeling of Stress :    Social Connections:     Frequency of Communication with Friends and Family:     Frequency of Social Gatherings with Friends and Family:     Attends Protestant Services:     Active Member of Clubs or Organizations:     Attends Club or Organization Meetings:     Marital Status:    Intimate Partner Violence:     Fear of Current or Ex-Partner:     Emotionally Abused:     Physically Abused:     Sexually Abused: Allergies: Allergies   Allergen Reactions    Lactose Intolerance (Gi) Diarrhea     Pt avoids excessive milk or ice cream        Current Medications:  No current outpatient medications on file. No current facility-administered medications for this visit. Physical Exam:  /78 (Site: Right Lower Arm, Position: Sitting, Cuff Size: Medium Adult)   Ht 6' 2\" (1.88 m)   Wt (!) 429 lb 3.2 oz (194.7 kg)   SpO2 96%   BMI 55.11 kg/m²   Wt Readings from Last 3 Encounters:   09/20/21 (!) 429 lb 3.2 oz (194.7 kg)   09/10/21 (!) 430 lb (195 kg)   08/31/21 (!) 436 lb (197.8 kg)     Physical Exam:  /78 (Site: Right Lower Arm, Position: Sitting, Cuff Size: Medium Adult)   Ht 6' 2\" (1.88 m)   Wt (!) 429 lb 3.2 oz (194.7 kg)   SpO2 96%   BMI 55.11 kg/m²   Wt Readings from Last 3 Encounters:   09/20/21 (!) 429 lb 3.2 oz (194.7 kg)   09/10/21 (!) 430 lb (195 kg)   08/31/21 (!) 436 lb (197.8 kg)     Physical Exam  Constitutional:       General: He is not in acute distress. Appearance: He is well-developed. Comments: Morbidly obese. HENT:      Head: Normocephalic and atraumatic. Neck:      Vascular: No carotid bruit or JVD. Cardiovascular:      Rate and Rhythm: Normal rate and regular rhythm. Heart sounds: No murmur heard. No friction rub. No gallop. Comments: Distant heart sounds. Pulmonary:      Breath sounds: No wheezing or rales. Comments: Fair air entry with no wheezing or crackles. Chest:      Chest wall: No tenderness. Abdominal:      General: Bowel sounds are normal. There is no distension. Palpations: Abdomen is soft. There is no mass. Tenderness: There is no abdominal tenderness. Musculoskeletal:      Cervical back: Neck supple. Right lower leg: No edema. Left lower leg: No edema. Skin:     General: Skin is warm and dry. Neurological:      Mental Status: He is alert and oriented to person, place, and time. Laboratory Tests:  Lab Results   Component Value Date    CREATININE 0.9 08/24/2021    BUN 13 08/24/2021     08/24/2021    K 4.0 08/24/2021     08/24/2021    CO2 26 08/24/2021       Lab Results   Component Value Date    WBC 6.9 08/24/2021    HGB 15.1 08/24/2021    HCT 46.5 08/24/2021    MCV 92.1 08/24/2021     08/24/2021     Lab Results   Component Value Date    ALT 28 08/24/2021    AST 16 08/24/2021    ALKPHOS 80 08/24/2021    BILITOT 0.5 08/24/2021       Lab Results   Component Value Date    TSH 1.139 09/12/2011     Lab Results   Component Value Date    LABA1C 5.6 08/24/2021       Lab Results   Component Value Date    CHOL 145 08/24/2021    CHOL 131 01/09/2013    CHOL 137 07/06/2011     Lab Results   Component Value Date    TRIG 74 08/24/2021    TRIG 106 01/09/2013    TRIG 71 07/06/2011     Lab Results   Component Value Date    HDL 27 01/19/2018    HDL 24.0 (A) 01/09/2013    HDL 29.0 (A) 07/06/2011     Lab Results   Component Value Date    LDLCALC 101 (H) 01/19/2018    LDLCALC 86 01/09/2013    LDLCALC 94 07/06/2011     Lab Results   Component Value Date    LABVLDL 22 01/19/2018         ASSESSMENT / PLAN:  -Preop clearance: The patient bariatric surgery carries a low risk for perioperative cardiovascular events if done laparoscopically. Since patient has been fairly active with no cardiovascular symptoms, he is cleared to undergo his surgery from the cardiac standpoint of view. -Bifascicular block: Probably related to his morbid obesity with possible obstructive sleep apnea. -Prehypertension: His blood pressure is normal at the office.  -History of low HDL. -Ex smoking.  -Morbid obesity. No need for cardiac testing in preop. Patient was advised to avoid smoking again.   Patient was advised to lose weight and to avoid gaining weight after his bariatric surgery. He can be seen in my office on a as needed basis. Thank you for allowing me to participate in your patient's care. Please feel free to contact me if you have any questions or concerns.     Pauline Ferrari MD [facility] [place], [address] [facility] Cardiology

## 2021-09-23 ENCOUNTER — INITIAL CONSULT (OUTPATIENT)
Dept: BARIATRICS/WEIGHT MGMT | Age: 41
End: 2021-09-23
Payer: COMMERCIAL

## 2021-09-23 VITALS — BODY MASS INDEX: 40.43 KG/M2 | WEIGHT: 315 LBS | HEIGHT: 74 IN

## 2021-09-23 DIAGNOSIS — E66.01 MORBID OBESITY DUE TO EXCESS CALORIES (HCC): ICD-10-CM

## 2021-09-23 DIAGNOSIS — Z71.3 DIETARY COUNSELING: Primary | ICD-10-CM

## 2021-09-23 PROCEDURE — 99999 PR OFFICE/OUTPT VISIT,PROCEDURE ONLY: CPT

## 2021-09-23 NOTE — PROGRESS NOTES
WEIGHT:  Weight: (!) 428 lb (194.1 kg)      Pt was in th office for his/her 4th weight Loss appointment. Pt is aware he/she must meet his/her pre-op weight loss goal in order to proceed with weight loss surgery. James / Joshua faxed a copy of what was reviewed with the pt to her PCP. Pt verbalized understanding. Rd// Joshua enc the following at today's visit for successful post-surgical Weight Maintenance    1. Weigh yourself daily and record it. 2. Keep documented food records daily   3. Just be more active in day to day routine   4. Higher protein intake and a higher fiber intake. Not a high protein or a high fiber diet just a higher intake. 5.Eliminate empty calorie consumption    James Lewis addressed the following with the pt:  - James Lewis enc pt to comply with nutrition recommendations  - James / Joshua enc Participation in support group meetings  - James Lewis enc pt to go back to maintenance of food records and weight monitoring records   - James Lewis reviewed the importance of adequate sleep and stress management  - James Lewis reviewed nonfood strategies to cope with emotions and stress  - James Lewis encouraged pt to practice the following: Mindful eating: Eating slowly: Focusing   on the eating experience without distraction  - James Lewis enc. pt to pay attention to hunger and fullness  - Rd / Ld enc meal planning  - James Goldberg pt to chose nutrient dense whole foods instead of soft, high calorie foods  - James / Joshua enc not dr Sanket Milansher large amounts of fluids with or immediately after meals    Portion control ,meal planning and avoiding empty calorie consumption. James / Joshua reviewed insurance company weight loss requirement on 9/23/21. Pt verbalized understanding. Please be aware at each visit you have been instructed that in order for your insurance company to approve your surgery you must show a consistent weight loss of 2 lbs or greater at each visit.  We can not guarantee an approval by your insurance company we can only provide the

## 2021-09-30 ENCOUNTER — VIRTUAL VISIT (OUTPATIENT)
Dept: BARIATRICS/WEIGHT MGMT | Age: 41
End: 2021-09-30
Payer: COMMERCIAL

## 2021-09-30 DIAGNOSIS — Z71.3 DIETARY COUNSELING: Primary | ICD-10-CM

## 2021-09-30 DIAGNOSIS — E66.01 MORBID OBESITY DUE TO EXCESS CALORIES (HCC): ICD-10-CM

## 2021-09-30 PROCEDURE — 99999 PR OFFICE/OUTPT VISIT,PROCEDURE ONLY: CPT

## 2021-10-04 ENCOUNTER — VIRTUAL VISIT (OUTPATIENT)
Dept: BARIATRICS/WEIGHT MGMT | Age: 41
End: 2021-10-04
Payer: COMMERCIAL

## 2021-10-04 DIAGNOSIS — Z71.3 DIETARY COUNSELING: Primary | ICD-10-CM

## 2021-10-04 DIAGNOSIS — E66.01 MORBID OBESITY DUE TO EXCESS CALORIES (HCC): ICD-10-CM

## 2021-10-04 PROCEDURE — 99999 PR OFFICE/OUTPT VISIT,PROCEDURE ONLY: CPT

## 2021-10-04 NOTE — PROGRESS NOTES
WEIGHT:  426 lbs     Pt was in th office for his/her 6th weight Loss appointment. Pt is aware he/she must meet his/her pre-op weight loss goal in order to proceed with weight loss surgery. James / Joshua faxed a copy of what was reviewed with the pt to her PCP. Pt verbalized understanding. James// Joshua enc the following at today's visit for successful post-surgical Weight Maintenance    Education:  Patient has been educated on the importance of reading food labels for the content of sugar and the content of fat that is in the foods serving size contributing to overall calorie consumption. Patient is aware how to identify hidden sugars and hidden fats found in food and reduce calorie intake of empty calories and high fat foods. Patient can verbalize the importance of label reading. Demonstrate the difference between a healthy food label and unhealthy food label. Real life food replicas demonstrating hidden sugars and hidden fats, and actual food labels were part of the patients education to help understand healthy eating habits and determine healthy food choices. 1. Weigh yourself daily and record it. 2. Keep documented food records daily   3. Just be more active in day to day routine   4. Higher protein intake and a higher fiber intake. Not a high protein or a high fiber diet just a higher intake. 5.Eliminate empty calorie consumption    James Lewis addressed the following with the pt:  - James Lewis enc pt to comply with nutrition recommendations  - Rd / Ld enc Participation in support group meetings  - James Lewis enc pt to go back to maintenance of food records and weight monitoring records   - James Lewis reviewed the importance of adequate sleep and stress management  - James Lewis reviewed nonfood strategies to cope with emotions and stress  - James Lewis encouraged pt to practice the following: Mindful eating: Eating slowly:  Focusing   on the eating experience without distraction  - James Lewis enc. pt to pay attention to hunger and fullness  - Rd / Ld enc meal planning  - Rd / Ld  Enc pt to chose nutrient dense whole foods instead of soft, high calorie foods  - Rd / Ld enc not dr Mali Borrero large amounts of fluids with or immediately after meals    Portion control ,meal planning and avoiding empty calorie consumption. Rd / Ld reviewed insurance company weight loss requirement on 10/4/21. Pt verbalized understanding. Please be aware at each visit you have been instructed that in order for your insurance company to approve your surgery you must show a consistent weight loss of 2 lbs or greater at each visit. We can not guarantee an approval by your insurance company we can only provide the information given to us it is up to you the patient to show compliancy to your insurance company.   If you do gain weight during your supervised weight loss counseling sessions insurance companies starting in 2018 are denying patients for not showing consistent weight loss results when part of a supervised weight loss counseling program.

## 2021-10-08 ENCOUNTER — INITIAL CONSULT (OUTPATIENT)
Dept: BARIATRICS/WEIGHT MGMT | Age: 41
End: 2021-10-08
Payer: COMMERCIAL

## 2021-10-08 VITALS — HEIGHT: 74 IN | WEIGHT: 315 LBS | BODY MASS INDEX: 40.43 KG/M2

## 2021-10-08 DIAGNOSIS — Z71.3 DIETARY COUNSELING: Primary | ICD-10-CM

## 2021-10-08 DIAGNOSIS — E66.01 MORBID OBESITY DUE TO EXCESS CALORIES (HCC): ICD-10-CM

## 2021-10-08 PROCEDURE — 99999 PR OFFICE/OUTPT VISIT,PROCEDURE ONLY: CPT

## 2021-10-08 NOTE — PROGRESS NOTES
to hunger and fullness  - Rd / Ld enc meal planning  - Rd / Ld  Enc pt to chose nutrient dense whole foods instead of soft, high calorie foods  - Rd / Ld enc not dr Huddleston Needy large amounts of fluids with or immediately after meals    Portion control ,meal planning and avoiding empty calorie consumption. Rd / Ld reviewed insurance company weight loss requirement on 10/8/21. Pt verbalized understanding. Please be aware at each visit you have been instructed that in order for your insurance company to approve your surgery you must show a consistent weight loss of 2 lbs or greater at each visit. We can not guarantee an approval by your insurance company we can only provide the information given to us it is up to you the patient to show compliancy to your insurance company.   If you do gain weight during your supervised weight loss counseling sessions insurance companies starting in 2018 are denying patients for not showing consistent weight loss results when part of a supervised weight loss counseling program.

## 2021-10-11 ENCOUNTER — VIRTUAL VISIT (OUTPATIENT)
Dept: BARIATRICS/WEIGHT MGMT | Age: 41
End: 2021-10-11
Payer: COMMERCIAL

## 2021-10-11 DIAGNOSIS — Z71.3 DIETARY COUNSELING: Primary | ICD-10-CM

## 2021-10-11 DIAGNOSIS — E66.01 MORBID OBESITY DUE TO EXCESS CALORIES (HCC): ICD-10-CM

## 2021-10-11 PROCEDURE — 99999 PR OFFICE/OUTPT VISIT,PROCEDURE ONLY: CPT

## 2021-10-11 NOTE — PROGRESS NOTES
WEIGHT:  423 lbs     Pt was in th office for his/her 8th weight Loss appointment. Pt is aware he/she must meet his/her pre-op weight loss goal in order to proceed with weight loss surgery. James / Joshua faxed a copy of what was reviewed with the pt to her PCP. Pt verbalized understanding. James// Joshua enc the following at today's visit for successful post-surgical Weight Maintenance    Education:  Patient has been educated on the importance of reading food labels for the content of sugar and the content of fat that is in the foods serving size contributing to overall calorie consumption. Patient is aware how to identify hidden sugars and hidden fats found in food and reduce calorie intake of empty calories and high fat foods. Patient can verbalize the importance of label reading. Demonstrate the difference between a healthy food label and unhealthy food label. Real life food replicas demonstrating hidden sugars and hidden fats, and actual food labels were part of the patients education to help understand healthy eating habits and determine healthy food choices. 1. Weigh yourself daily and record it. 2. Keep documented food records daily   3. Just be more active in day to day routine   4. Higher protein intake and a higher fiber intake. Not a high protein or a high fiber diet just a higher intake. 5.Eliminate empty calorie consumption    James Lewis addressed the following with the pt:  - James Lewis enc pt to comply with nutrition recommendations  - James Lewis enc Participation in support group meetings  - James Lewis enc pt to go back to maintenance of food records and weight monitoring records   - James Lewis reviewed the importance of adequate sleep and stress management  - James Lewis reviewed nonfood strategies to cope with emotions and stress  - James Lewis encouraged pt to practice the following: Mindful eating: Eating slowly:  Focusing   on the eating experience without distraction  - James Lewis enc. pt to pay attention to hunger and fullness  - Rd / Ld enc meal planning  - Rd / Ld  Enc pt to chose nutrient dense whole foods instead of soft, high calorie foods  - Rd / Ld enc not dr Siena Spencer large amounts of fluids with or immediately after meals    Portion control ,meal planning and avoiding empty calorie consumption. Rd / Ld reviewed insurance company weight loss requirement on 10/11/21. Pt verbalized understanding. Please be aware at each visit you have been instructed that in order for your insurance company to approve your surgery you must show a consistent weight loss of 2 lbs or greater at each visit. We can not guarantee an approval by your insurance company we can only provide the information given to us it is up to you the patient to show compliancy to your insurance company.   If you do gain weight during your supervised weight loss counseling sessions insurance companies starting in 2018 are denying patients for not showing consistent weight loss results when part of a supervised weight loss counseling program.

## 2021-10-15 ENCOUNTER — INITIAL CONSULT (OUTPATIENT)
Dept: BARIATRICS/WEIGHT MGMT | Age: 41
End: 2021-10-15
Payer: COMMERCIAL

## 2021-10-15 ENCOUNTER — HOSPITAL ENCOUNTER (OUTPATIENT)
Age: 41
Discharge: HOME | End: 2021-10-15
Payer: COMMERCIAL

## 2021-10-15 VITALS — WEIGHT: 315 LBS | BODY MASS INDEX: 40.43 KG/M2 | HEIGHT: 74 IN

## 2021-10-15 DIAGNOSIS — Z78.9 CURRENT NONSMOKER: ICD-10-CM

## 2021-10-15 DIAGNOSIS — Z02.6 ENCOUNTER FOR EXAMINATION FOR INSURANCE PURPOSES: ICD-10-CM

## 2021-10-15 DIAGNOSIS — Z71.3 DIETARY COUNSELING: Primary | ICD-10-CM

## 2021-10-15 DIAGNOSIS — E66.01 MORBID OBESITY DUE TO EXCESS CALORIES (HCC): ICD-10-CM

## 2021-10-15 PROCEDURE — 99999 PR OFFICE/OUTPT VISIT,PROCEDURE ONLY: CPT

## 2021-10-15 PROCEDURE — G0480 DRUG TEST DEF 1-7 CLASSES: HCPCS

## 2021-10-15 NOTE — PROGRESS NOTES
WEIGHT:  Weight: (!) 422 lb (191.4 kg)      Pt was in th office for his/her 9th weight Loss appointment. Pt is aware he/she must meet his/her pre-op weight loss goal in order to proceed with weight loss surgery. James / Joshua faxed a copy of what was reviewed with the pt to her PCP. Pt verbalized understanding. James// Joshua enc the following at today's visit for successful post-surgical Weight Maintenance    Education:  Patient has been educated on the importance of reading food labels for the content of sugar and the content of fat that is in the foods serving size contributing to overall calorie consumption. Patient is aware how to identify hidden sugars and hidden fats found in food and reduce calorie intake of empty calories and high fat foods. Patient can verbalize the importance of label reading. Demonstrate the difference between a healthy food label and unhealthy food label. Real life food replicas demonstrating hidden sugars and hidden fats, and actual food labels were part of the patients education to help understand healthy eating habits and determine healthy food choices. 1. Weigh yourself daily and record it. 2. Keep documented food records daily   3. Just be more active in day to day routine   4. Higher protein intake and a higher fiber intake. Not a high protein or a high fiber diet just a higher intake. 5.Eliminate empty calorie consumption    James Lewis addressed the following with the pt:  - James Lewis enc pt to comply with nutrition recommendations  - Rd / Ld enc Participation in support group meetings  - James Lewis enc pt to go back to maintenance of food records and weight monitoring records   - James Lewis reviewed the importance of adequate sleep and stress management  - James Lewis reviewed nonfood strategies to cope with emotions and stress  - James Lewis encouraged pt to practice the following: Mindful eating: Eating slowly:  Focusing   on the eating experience without distraction  - James Lewis enc. pt to pay attention to hunger and fullness  - Rd / Ld enc meal planning  - Rd / Ld  Enc pt to chose nutrient dense whole foods instead of soft, high calorie foods  - Rd / Ld enc not dr Suhail Green large amounts of fluids with or immediately after meals    Portion control ,meal planning and avoiding empty calorie consumption. Rd / Ld reviewed insurance company weight loss requirement on 10/15/21. Pt verbalized understanding. Please be aware at each visit you have been instructed that in order for your insurance company to approve your surgery you must show a consistent weight loss of 2 lbs or greater at each visit. We can not guarantee an approval by your insurance company we can only provide the information given to us it is up to you the patient to show compliancy to your insurance company.   If you do gain weight during your supervised weight loss counseling sessions insurance companies starting in 2018 are denying patients for not showing consistent weight loss results when part of a supervised weight loss counseling program.

## 2021-10-18 ENCOUNTER — VIRTUAL VISIT (OUTPATIENT)
Dept: BARIATRICS/WEIGHT MGMT | Age: 41
End: 2021-10-18
Payer: COMMERCIAL

## 2021-10-18 DIAGNOSIS — Z71.3 DIETARY COUNSELING: Primary | ICD-10-CM

## 2021-10-18 DIAGNOSIS — E66.01 MORBID OBESITY DUE TO EXCESS CALORIES (HCC): ICD-10-CM

## 2021-10-18 PROCEDURE — 99999 PR OFFICE/OUTPT VISIT,PROCEDURE ONLY: CPT

## 2021-10-18 NOTE — PROGRESS NOTES
WEIGHT:  422 lbs    Pt was in th office for his/her 10th weight Loss appointment. Pt is aware he/she must meet his/her pre-op weight loss goal in order to proceed with weight loss surgery. James / Joshua faxed a copy of what was reviewed with the pt to her PCP. Pt verbalized understanding. James// Joshua enc the following at today's visit for successful post-surgical Weight Maintenance    Education:  Patient has been educated on the importance of reading food labels for the content of sugar and the content of fat that is in the foods serving size contributing to overall calorie consumption. Patient is aware how to identify hidden sugars and hidden fats found in food and reduce calorie intake of empty calories and high fat foods. Patient can verbalize the importance of label reading. Demonstrate the difference between a healthy food label and unhealthy food label. Real life food replicas demonstrating hidden sugars and hidden fats, and actual food labels were part of the patients education to help understand healthy eating habits and determine healthy food choices. 1. Weigh yourself daily and record it. 2. Keep documented food records daily   3. Just be more active in day to day routine   4. Higher protein intake and a higher fiber intake. Not a high protein or a high fiber diet just a higher intake. 5.Eliminate empty calorie consumption    James Lewis addressed the following with the pt:  - James Lewis enc pt to comply with nutrition recommendations  - James Lewis enc Participation in support group meetings  - James Lewis enc pt to go back to maintenance of food records and weight monitoring records   - James Lewis reviewed the importance of adequate sleep and stress management  - James Lewis reviewed nonfood strategies to cope with emotions and stress  - James Lewis encouraged pt to practice the following: Mindful eating: Eating slowly:  Focusing   on the eating experience without distraction  - James Lewis enc. pt to pay attention to hunger and fullness  - Rd / Ld enc meal planning  - Rd / Ld  Enc pt to chose nutrient dense whole foods instead of soft, high calorie foods  - Rd / Ld enc not dr Kirk Mediate large amounts of fluids with or immediately after meals    Portion control ,meal planning and avoiding empty calorie consumption. Rd / Ld reviewed insurance company weight loss requirement on 10/18/21. Pt verbalized understanding. Please be aware at each visit you have been instructed that in order for your insurance company to approve your surgery you must show a consistent weight loss of 2 lbs or greater at each visit. We can not guarantee an approval by your insurance company we can only provide the information given to us it is up to you the patient to show compliancy to your insurance company.   If you do gain weight during your supervised weight loss counseling sessions insurance companies starting in 2018 are denying patients for not showing consistent weight loss results when part of a supervised weight loss counseling program.

## 2021-10-21 LAB
3-OH-COTININE: <2 NG/ML
COTININE: <2 NG/ML
NICOTINE: <2 NG/ML

## 2021-10-22 ENCOUNTER — INITIAL CONSULT (OUTPATIENT)
Dept: BARIATRICS/WEIGHT MGMT | Age: 41
End: 2021-10-22
Payer: COMMERCIAL

## 2021-10-22 VITALS — BODY MASS INDEX: 40.43 KG/M2 | WEIGHT: 315 LBS | HEIGHT: 74 IN

## 2021-10-22 DIAGNOSIS — Z71.3 DIETARY COUNSELING: Primary | ICD-10-CM

## 2021-10-22 DIAGNOSIS — E66.01 MORBID OBESITY DUE TO EXCESS CALORIES (HCC): ICD-10-CM

## 2021-10-22 PROCEDURE — 99999 PR OFFICE/OUTPT VISIT,PROCEDURE ONLY: CPT

## 2021-10-22 NOTE — PROGRESS NOTES
hunger and fullness  - Rd / Ld enc meal planning  - Rd / Ld  Enc pt to chose nutrient dense whole foods instead of soft, high calorie foods  - Rd / Ld enc not dr Waldo Romero large amounts of fluids with or immediately after meals    Portion control ,meal planning and avoiding empty calorie consumption. Rd / Ld reviewed insurance company weight loss requirement on 10/22/21. Pt verbalized understanding. Please be aware at each visit you have been instructed that in order for your insurance company to approve your surgery you must show a consistent weight loss of 2 lbs or greater at each visit. We can not guarantee an approval by your insurance company we can only provide the information given to us it is up to you the patient to show compliancy to your insurance company.   If you do gain weight during your supervised weight loss counseling sessions insurance companies starting in 2018 are denying patients for not showing consistent weight loss results when part of a supervised weight loss counseling program.

## 2021-10-26 ENCOUNTER — INITIAL CONSULT (OUTPATIENT)
Dept: BARIATRICS/WEIGHT MGMT | Age: 41
End: 2021-10-26
Payer: COMMERCIAL

## 2021-10-26 ENCOUNTER — TELEPHONE (OUTPATIENT)
Dept: BARIATRICS/WEIGHT MGMT | Age: 41
End: 2021-10-26

## 2021-10-26 VITALS — HEIGHT: 74 IN | WEIGHT: 315 LBS | BODY MASS INDEX: 40.43 KG/M2

## 2021-10-26 DIAGNOSIS — Z71.3 NUTRITIONAL COUNSELING: Primary | ICD-10-CM

## 2021-10-26 DIAGNOSIS — Z00.8 NUTRITIONAL ASSESSMENT: ICD-10-CM

## 2021-10-26 PROCEDURE — 99999 PR OFFICE/OUTPT VISIT,PROCEDURE ONLY: CPT | Performed by: DIETITIAN, REGISTERED

## 2021-10-26 NOTE — PROGRESS NOTES
Weight Loss Assessment: Completed by Nutrition Services RD/LD Certified in Adult Weight Management:  Phone Number:  [phone]  Fax Number:   [phone]    Ansley Feliciano   10/26/21  Weight Loss Appointment: 12th Weight Loss Appointment      Wt Readings from Last 3 Encounters:   10/26/21 (!) 421 lb (191 kg)   10/22/21 (!) 421 lb (191 kg)   10/15/21 (!) 422 lb (191.4 kg)        (!) 421 lb (191 kg)  IBW: 197 lbs         % IBW: 214%       % EBWL: 5%           ABW: 253 lbs  % ABW: 166%       BMI: Body mass index is 54.05 kg/m². Patient's 24 Hour Recall:  Breakfast: Hard Boiled Egg, Coffee  Snack: None  Lunch: Premiere Protein Shake, Apple  Snack: None  Dinner: Piece of Cod Fish and Broccoli  Snack: Banana  Water Intake: 80 oz  Other Beverages: Coffee , Protein Shake  Exercise: ADL's - Walking - On break's at work - 7 Day's a week - Duration: 39 Minutes / Anabella Seals at home - 5 day's a week - 13 - 21 Minutes / Yard Work        Education:    1. Weigh yourself daily and record it. 2. Keep documented food records daily   3. 220-225 minutes a week of moderate physical activity   4. Just be more active in day to day routine   5. Higher protein intake and a higher fiber intake. Not a high protein or a high fiber diet just a higher intake. James Lewis addressed the following with the pt:  - James Lewis enc pt to comply with nutrition recommendations  - James Lewis enc to go back to maintenance of regular physical activity  - Periodic assessment to prevent and treat eating or other psychiatric disorders   - James / Joshua enc participation in support group meetings  - James Lewis enc pt to go back to maintenance of daily food records and weight monitoring records   - James Lewis reviewed the importance of adequate sleep and stress management  - James Lewis reviewed nonfood strategies to cope with emotions and stress  - James Lewis encouraged pt to practice the following: Mindful eating: Eating slowly:  Focusing on the eating experience without distraction  - James Ld enc. pt to pay attention to hunger and fullness cues  - James / Ld enc meal planning  - Rd / Gabriel  enc pt to chose nutrient dense whole foods instead of soft, high calorie foods  - James / Gabriel enc not drinking large amounts of fluids with or immediately after meals    Portion control, meal planning and avoiding empty calorie consumption. Weight loss goal for next follow-up appointment: 10 lbs    Patient has established the following three goals for the next follow-up appointment. 1. Pt states he wants to continue to follow- a low-fat, low-calorie diet. 2. Pt states he wants to continue to eliminate empty calorie consumption from within his diet. 3. Pt states he wants to continue to keep daily food records. Patient has established the following exercise goal for next follow-up appointment:  ADL's - Walking - On break's at work - 7 Day's a week - Duration: 45 Minutes / Elroy Walsh at home - 5 day's a week - 13 - 20 Minutes / Wayne Shipley Work        Did the patient keep food records:Yes    Pt. is aware if they do not comply with [facility] Guidelines that this can lead to the patient being dismissed from the program.    The registered dietitian spent the following time 60 minutes educating the patient and providing the patient with nutritional handouts to follow. __________________________________________________________________________________  Primary Care Physician Follow-up:  Pt. was seen by Ankur Bajwa RD/GABRIEL regarding weight loss education and follow-up on 10/26/21. This was the patients 12th appointment with the registered dietitian. The registered dietitian spent the following amount of time with the patient 60 minutes. Please [facility] the following: The Primary Care Physician reviewed the above nutrition assessment and patient education and agrees with current diet plan.     The Primary Care Physician wants the current diet plan changed to the following:_____________________________________________________________________________________________________________________________________________________________________________________________________________. Physician Signature:__________________________ Date:______________  Once signed please fax back to the [facility] [phone]. We thank you for allowing us to participate in your patients care.

## 2021-10-27 ENCOUNTER — TELEPHONE (OUTPATIENT)
Dept: BARIATRICS/WEIGHT MGMT | Age: 41
End: 2021-10-27

## 2021-10-27 ENCOUNTER — VIRTUAL VISIT (OUTPATIENT)
Dept: BARIATRICS/WEIGHT MGMT | Age: 41
End: 2021-10-27
Payer: COMMERCIAL

## 2021-10-27 DIAGNOSIS — Z00.8 NUTRITIONAL ASSESSMENT: ICD-10-CM

## 2021-10-27 DIAGNOSIS — Z71.3 NUTRITIONAL COUNSELING: Primary | ICD-10-CM

## 2021-10-27 PROCEDURE — 99999 PR OFFICE/OUTPT VISIT,PROCEDURE ONLY: CPT | Performed by: DIETITIAN, REGISTERED

## 2021-10-27 NOTE — PROGRESS NOTES
James Lewis called the pt and scheduled the pt for the following. Pt is aware he/she needs to be down to their pre-op weight loss goal when they come in for their weight check or their sx will be cx. Pt verbalized understanding. Low fat diet reviewed. Pre-op weight loss goal reviewed. Pt scheduled for the following see below. Pt is aware supplements are cash, check, credit or debt card. SX Type: RYGB  SX Date: ???  H&P Appt: Dr. Adams Padilla ???  Weight Check Appt: This will occur after the 3 hour class at the [facility]  3 Hour Class: At the [facility] From 10:00 - 1:00 pm on - Nov 17th 2021 RYGB Class  Supplements: Pt needs  2 Hour Pt Education Book: Pt needs  ASMBS Change in Pain Medication Education Book: Pt needs    Pt was instructed he / she can call any time with questions. Goal Weight 411 lbs - Pt has copy of pre-op diet. Enc pt to follow pre-op diet to get down to pre-op weight loss goal. Pt verbalized understanding.   Pt is aware sx can be cx by her surgeon at H&P appt if he feels pt has not achieved pre-op weight loss goal.

## 2021-10-27 NOTE — TELEPHONE ENCOUNTER
James Lewis called the pt and scheduled the pt for the following. Pt is aware he/she needs to be down to their pre-op weight loss goal when they come in for their weight check or their sx will be cx. Pt verbalized understanding. Low fat diet reviewed. Pre-op weight loss goal reviewed. Pt scheduled for the following see below. Pt is aware supplements are cash, check, credit or debt card. SX Type: RYGB  SX Date: ???  H&P Appt: Dr. Reddy Mcbride ???  Weight Check Appt: This will occur after the 3 hour class at the [facility]  3 Hour Class: At the [facility] From 10:00 - 1:00 pm on - Nov 17th 2021 RYGB Class  Supplements: Pt needs  2 Hour Pt Education Book: Pt needs  ASMBS Change in Pain Medication Education Book: Pt needs    Pt was instructed he / she can call any time with questions. Goal Weight 411 lbs - Pt has copy of pre-op diet. Enc pt to follow pre-op diet to get down to pre-op weight loss goal. Pt verbalized understanding.   Pt is aware sx can be cx by her surgeon at H&P appt if he feels pt has not achieved pre-op weight loss goal.

## 2021-10-27 NOTE — LETTER
Medical Clearance / Medical Necessity for Devika-en-Y Gastric Bypass    Name: Oralia Yu                                     YOB: 1980    I have been caring for the above patient for _____ years. He/she suffers from the following medical conditions:  __________________________________________________________________________________________________________________________________________________________________________________________________________________    The above medical conditions are either caused by or worsened by the patients morbid obesity. Yes_________ No__________    The above medical conditions are currently stable:      Yes_________ No__________    The following medical conditions are difficult to manage/require multiple medications to achieve control due to the patients weight: ______________________________________________________________________  ______________________________________________________________________                    The above patient has participated in the following medical weight loss efforts without long-term weight reduction:  ____________________________________________________________________________________________________________________________________________    I am in support of my patient undergoing a weight loss surgical procedure with [address] [place] [address] [facility] and the patient understands the risks and benefits of weight loss surgery. The patient has reasonable expectations and I believe the patient will be compliant with all post-surgical requirements. I understand the program is comprehensive with dedicated and specially trained staff.  In the event that my patient is over the age of 61, I would like to state that the patients physiological age and co-morbid conditions result in a positive risk to benefit ratio.        ________  In my medical opinion, there are no medical contraindications to proceed with gastric bypass surgery and the patients benefits of surgery outweigh the risks of surgery.       Physician Name (Please Print): __________________________________    Primary Care Physicians Signature: __________________________________    Date: ______/______/______

## 2021-10-27 NOTE — TELEPHONE ENCOUNTER
Call placed to [facility] to start [facility] for LRYGB 12-. Spoke to Tala Owens, pending ref number is [id number]. Clinicals faxed as requested and fax confirmation received. Also discussed need for medical clearance. Request faxed to PCP and patient was instructed to call office and ask if appointment is needed.

## 2021-10-27 NOTE — LETTER
Date: 10-     [employer]: Attention Pre-Determination    Regarding:  Mansoor Zavala  Member ID:  [id number]    Request:     Authorization for CPT 89360  (Laparoscopic Devika-en-Y)                      Diagnosis Codes:  E66.01; I10; M19.90    Physician: Amandeep Lane M.D.  ([address] [facility] [id number])           (NPI [id number])    Facility: [address] [place] [address]  ([facility] [id number])    [facility] (NPI [id number])    [place]      Dear Priyanka Norman or :     Pre-determination of insurance coverage, and authorization for hospitalization and surgical treatment are requested on behalf of your annuitant Jose Morataya for diagnoses of morbid obesity, hypertension and degenerative joint disease. Jose Morataya is 6'2, weighs 421 lb., and has a BMI of 54. He has been severely obese for a number of years despite many years of dietary efforts. He has lost weight through these efforts, however has been unable to maintain satisfactory weight loss. Jose Morataya has been evaluated in our bariatric program and is felt to be an excellent candidate  for surgery. The patient has undergone extensive pre-operative education and understands all the risks, benefits and possible complications of surgery. He has also undergone thorough nutritional evaluation and counseling with our registered dietician. Our program provides long term nutritional counseling with unlimited consults with the dietician. All female patients have been educated on the importance of using reliable birth control and avoiding pregnancy for the first 2 years following bariatric surgery. All patients are nicotine tested and require a negative nicotine level prior to surgery. All patients are counseled on the importance of remaining nicotine free after surgery, as well as avoiding drug and alcohol use for life after bariatric surgery.      I am requesting authorization for Laparoscopic Devika-en-Y Gastric Bypass, procedure code 68124, with

## 2021-11-04 ENCOUNTER — TELEPHONE (OUTPATIENT)
Dept: BARIATRICS/WEIGHT MGMT | Age: 41
End: 2021-11-04

## 2021-11-04 NOTE — TELEPHONE ENCOUNTER
Per order of Dr. Heena Deluna patient  is ready to be scheduled for LRYGB. Call placed to patient and he is in agreement with surgery on 12/14/2021. He has his class scheduled with Lyudmila Garcia. He has his final medical clearance appointment scheduled with his PCP. He is struggling with his weight loss goal. He will continue to follow diet given to him by Lyudmila Garcia if weight is still not decreasing he will call in to discuss this with dieticians. He does not smoke or drink alcohol. We reviewed at length all med's to avoid 2 weeks prior to surgery and he voiced understanding. I will mail him copy of the pre op letter. Call placed to surgery schedulers and patient placed on google calendar. Post op appointment set. He is fully COVID vaccinated. Chart to Severiano Fitzgerald for insurance update.

## 2021-11-17 ENCOUNTER — INITIAL CONSULT (OUTPATIENT)
Dept: BARIATRICS/WEIGHT MGMT | Age: 41
End: 2021-11-17

## 2021-11-17 VITALS — HEIGHT: 74 IN | BODY MASS INDEX: 40.43 KG/M2 | WEIGHT: 315 LBS

## 2021-11-17 DIAGNOSIS — Z71.3 NUTRITIONAL COUNSELING: Primary | ICD-10-CM

## 2021-11-17 DIAGNOSIS — Z00.8 NUTRITIONAL ASSESSMENT: ICD-10-CM

## 2021-11-17 PROCEDURE — 99999 PR OFFICE/OUTPT VISIT,PROCEDURE ONLY: CPT | Performed by: DIETITIAN, REGISTERED

## 2021-11-17 NOTE — PROGRESS NOTES
Patient attended a 3 Hour Initial Nutrition Consult Class for RYGB on 11/17/21. Patient was able to verbalize understanding of the class. [facility]  Nutrition History and Physical     Ad Hartman    Surgery Type: RYGB  Today's Date: 11/17/21    yes  Patient attended a 3 hour nutrition education class on 11/17/21, and was given written educational material.  Patient signed and verbalized understanding of nutrition therapy for Bariatric Surgery. Assessment:              Vitals:    11/17/21 1525   Weight: (!) 414 lb (187.8 kg)   Height: 6' 2\" (1.88 m)    Height: 6' 2\" (1.88 m) Weight: (!) 414 lb (187.8 kg)   BMI:Body mass index is 53.15 kg/m². Food Allergies and Allergies: No    Food Intolerances: Lactose Intolerance - Milk and Ice Cream Only   Eating Problems:No  Chewing Problems:No  Swallowing Problems:No    Current Vitamins/Supplements and Medications:  Current Outpatient Medications:     Multiple Vitamin (MULTI VITAMIN DAILY PO), Take by mouth daily, Disp: , Rfl:     Vitamin D (CHOLECALCIFEROL) 25 MCG (1000 UT) TABS tablet, Take 1,000 Units by mouth daily, Disp: , Rfl:     omeprazole (PRILOSEC) 10 MG delayed release capsule, Take 10 mg by mouth daily as needed, Disp: , Rfl:   _    Please check all that apply:  Yes - Patient lost 10% of excess body weight prior to surgery  Yes - Patient  is able to verbalize a Bariatric Full Liquid Diet. Yes - Patient is able to verbalize the usage & importance of Protein Supplements. Yes- Patient purchased 3 month supply of protein vitamins and calcium. YES - Patient is instructed to follow a low-fat diet from now until surgery date. YES - Patient is instructed to take 30 grams of a protein supplement from  now until surgery date in addition to low-fat diet guidelines.   YES - Patient is instructed to consume 64 ounces of water daily from now until surgery date.  ________________________________________________________________________  No - Patient did  lose 10% of excess body weight prior to surgery  ________________________________________________________________________  Yes - Patient did purchase 3 month supply of protein, vitamins and Calcium.     Comments:   [facility] Supplements

## 2021-11-17 NOTE — LETTER
[facility]   [address] [facility]  Phone: [phone]  Fax: [phone]    Pito Han RD, GABRIEL        November 17, 2021    [address] [place] [id number]      Dear Dianne Hernandez:        I personally wanted to thank you for selecting The [address] [place] [address] and [facility] as your center of choice for surgery. I wanted to take the time to let you know it means a lot to me to be able to work with you both before and after surgery and I am so glad that you will become part of our surgical family. It has been a privilege to get to know you at your 3 Hour Nutrition Class and become part of your new journey on life. I look forward to working with you in the future and helping you achieve your new goals. I can't wait to see the growth and transformation that occurs for you after your surgery. If at any time you have any questions you can always contact me [phone].      Respectfully,          Pito Han RDN/ GABRIEL  Bariatric Dietitian  [address] [place] [address] and [facility]  Certified In Adult Weight Management I and II  Certified In Pediatric and Adolescent Weight Management      Pito Han RD, GABRIEL

## 2021-12-03 ENCOUNTER — OFFICE VISIT (OUTPATIENT)
Dept: BARIATRICS/WEIGHT MGMT | Age: 41
End: 2021-12-03
Payer: COMMERCIAL

## 2021-12-03 VITALS
HEIGHT: 74 IN | RESPIRATION RATE: 20 BRPM | BODY MASS INDEX: 40.43 KG/M2 | HEART RATE: 74 BPM | DIASTOLIC BLOOD PRESSURE: 70 MMHG | TEMPERATURE: 97.2 F | SYSTOLIC BLOOD PRESSURE: 132 MMHG | WEIGHT: 315 LBS

## 2021-12-03 DIAGNOSIS — R11.2 PONV (POSTOPERATIVE NAUSEA AND VOMITING): ICD-10-CM

## 2021-12-03 DIAGNOSIS — Z98.890 PONV (POSTOPERATIVE NAUSEA AND VOMITING): ICD-10-CM

## 2021-12-03 DIAGNOSIS — K91.2 MALNUTRITION FOLLOWING GASTROINTESTINAL SURGERY: Primary | ICD-10-CM

## 2021-12-03 DIAGNOSIS — K21.9 GASTROESOPHAGEAL REFLUX DISEASE WITHOUT ESOPHAGITIS: ICD-10-CM

## 2021-12-03 PROCEDURE — 99213 OFFICE O/P EST LOW 20 MIN: CPT

## 2021-12-03 PROCEDURE — 99214 OFFICE O/P EST MOD 30 MIN: CPT | Performed by: SURGERY

## 2021-12-03 RX ORDER — OMEPRAZOLE 20 MG/1
20 CAPSULE, DELAYED RELEASE ORAL DAILY
Qty: 30 CAPSULE | Refills: 12 | Status: SHIPPED | OUTPATIENT
Start: 2021-12-03

## 2021-12-03 RX ORDER — ONDANSETRON 4 MG/1
4 TABLET, ORALLY DISINTEGRATING ORAL EVERY 8 HOURS PRN
Qty: 21 TABLET | Refills: 0 | Status: SHIPPED
Start: 2021-12-03 | End: 2022-02-11

## 2021-12-03 NOTE — PROGRESS NOTES
Avery Dahl  12/3/2021  [id number]  [facility]  [facility]               History and Physical  Gastric Bypass     CHIEF COMPLAINT: Morbid obesity,  malnutrition, Hypertension, GERD and Degenerative Joint Disease (DJD)    HISTORY OF PRESENT ILLNESS: Avery Dahl is a morbidly-obese 39 y.o.  male, who weighs (!) 415 lb (188.2 kg). He is 218 pounds over his ideal body weight. The Body mass index is 53.28 kg/m². He has lost 20 pounds over the past several months in preparation for surgery. He has multiple medical problems aggravated by his obesity. He wishes to have a gastric bypass so that he can lose more weight and keep the weight off. I have met with him on 2 different occasions in the [facility], where we discussed the surgery in great detail and went over the risks and benefits. He has watched our informational video so he understands all of the extensive risks involved. He states that he understands all of these risks and wishes to proceed. Allergies   Allergen Reactions    Adhesive Tape     Lactose Intolerance (Gi) Diarrhea     Pt avoids excessive milk or ice cream        Current Outpatient Medications on File Prior to Visit   Medication Sig Dispense Refill    Multiple Vitamin (MULTI VITAMIN DAILY PO) Take by mouth daily      Vitamin D (CHOLECALCIFEROL) 25 MCG (1000 UT) TABS tablet Take 1,000 Units by mouth daily      omeprazole (PRILOSEC) 10 MG delayed release capsule Take 10 mg by mouth daily as needed       No current facility-administered medications on file prior to visit.        Past Medical History:   Diagnosis Date    Irritable bowel syndrome     Morbid (severe) obesity due to excess calories (HCC)     Obesity     Prehypertension     Tobacco use        Past Surgical History:   Procedure Laterality Date    COLONOSCOPY  2009    normal    UPPER GASTROINTESTINAL ENDOSCOPY N/A 8/24/2021    EGD BIOPSY performed by Joseph Daugherty MD Out of Food in the Last Year: Not on file    Ran Out of Food in the Last Year: Not on file   Transportation Needs:     Lack of Transportation (Medical): Not on file    Lack of Transportation (Non-Medical):  Not on file   Physical Activity:     Days of Exercise per Week: Not on file    Minutes of Exercise per Session: Not on file   Stress:     Feeling of Stress : Not on file   Social Connections:     Frequency of Communication with Friends and Family: Not on file    Frequency of Social Gatherings with Friends and Family: Not on file    Attends Orthodoxy Services: Not on file    Active Member of [address] [place] [address] or Organizations: Not on file    Attends Club or Organization Meetings: Not on file    Marital Status: Not on file   Intimate Partner Violence:     Fear of Current or Ex-Partner: Not on file    Emotionally Abused: Not on file    Physically Abused: Not on file    Sexually Abused: Not on file   Housing Stability:     Unable to Pay for Housing in the Last Year: Not on file    Number of [place] in the Last Year: Not on file    Unstable Housing in the Last Year: Not on file       Review of Systems  General ROS: negative for - chills, fatigue or malaise  ENT ROS: negative for - hearing change, nasal congestion or nasal discharge  Allergy and Immunology ROS: negative for - hives, itchy/watery eyes or nasal congestion  Hematological and Lymphatic ROS: negative for - blood clots, blood transfusions, bruising or fatigue  Endocrine ROS: negative for - malaise/lethargy, mood swings, palpitations or polydipsia/polyuria  Respiratory ROS: negative for - sputum changes, stridor, tachypnea or wheezing  Cardiovascular ROS: negative for - irregular heartbeat, loss of consciousness, murmur or orthopnea  Gastrointestinal ROS: negative for - constipation, diarrhea, gas/bloating, heartburn or hematemesis  Genito-Urinary ROS: negative for -  genital discharge, genital ulcers or hematuria  Musculoskeletal ROS: negative for - gait disturbance, muscle pain or muscular weakness  Psychiatric ROS: negative for - visual or auditory hallucinations, suicidal ideation      Physical Exam:   Vitals: /70 (Site: Left Lower Arm, Position: Sitting, Cuff Size: Large Adult)   Pulse 74   Temp 97.2 °F (36.2 °C) (Temporal)   Resp 20   Ht 6' 2\" (1.88 m)   Wt (!) 415 lb (188.2 kg)   BMI 53.28 kg/m²     General appearance: AAO, NAD  Eyes: PERRL, EOMI, red conjunctiva  Lungs: CTAB, no wheeze, no rhonchi  Chest wall: atraumatic, no tenderness, no echymosis or abrasions  Heart: reg rate, no murmur  Abdomen: soft, nondistended, nontender  Extremities: full ROM all 4 ext, no gross motor or sensory deficits  Pulses: 2+ distal  Skin: warm and dry  Neurologic: spontanous eye opening, purposeful, follows complex commands  Psych: No hallucinations      Assessment:  Morbid obesity with failure of conservative therapy. Patient has been cleared psychologically and medically. The gall bladder ultrasound was normal. Upper endoscopy showed no hiatal hernia. The patient was informed that risks include, but are not limited to: death, anastomotic leak, obstruction, bleeding, and sepsis. Any of these could require further surgery. Other risks include heart attack, DVT, PE, pneumonia, hernia, wound infection, the need for dilatations of the gastrojejunostomy, and the inability to lose appropriate weight and keep it off. We may not be able to do a Gastic Bypass, in that case we will do a Sleeve Gastrectomy. We discussed that our goal is to ameliorate the medical problems and not to obtain a specific body mass index. He understands the risks and benefits and wishes to proceed with the procedure. He has signed a consent form. Plan:  ([id number]) Laparoscopic Devika-en-Y Gastric Bypass possible hiatal hernia repair. He does not need Lovenox post-op.     Physician Signature: Electronically signed by Dr. Keny Thompson MD    Send copy of H&P to PCP, Amilcar Ortiz, DO

## 2021-12-07 RX ORDER — SODIUM CHLORIDE 0.9 % (FLUSH) 0.9 %
5-40 SYRINGE (ML) INJECTION EVERY 12 HOURS SCHEDULED
Status: CANCELLED | OUTPATIENT
Start: 2021-12-07

## 2021-12-07 RX ORDER — SODIUM CHLORIDE 0.9 % (FLUSH) 0.9 %
5-40 SYRINGE (ML) INJECTION PRN
Status: CANCELLED | OUTPATIENT
Start: 2021-12-07

## 2021-12-07 RX ORDER — SODIUM CHLORIDE 9 MG/ML
25 INJECTION, SOLUTION INTRAVENOUS PRN
Status: CANCELLED | OUTPATIENT
Start: 2021-12-07

## 2021-12-08 ENCOUNTER — HOSPITAL ENCOUNTER (OUTPATIENT)
Dept: PREADMISSION TESTING | Age: 41
Discharge: HOME | End: 2021-12-08
Payer: COMMERCIAL

## 2021-12-08 VITALS
DIASTOLIC BLOOD PRESSURE: 89 MMHG | HEART RATE: 80 BPM | OXYGEN SATURATION: 95 % | TEMPERATURE: 96.8 F | RESPIRATION RATE: 18 BRPM | SYSTOLIC BLOOD PRESSURE: 154 MMHG

## 2021-12-08 DIAGNOSIS — Z01.818 PRE-OP TESTING: Primary | ICD-10-CM

## 2021-12-08 LAB
ALBUMIN SERPL-MCNC: 4.3 G/DL (ref 3.5–5.2)
ALP BLD-CCNC: 74 U/L (ref 40–129)
ALT SERPL-CCNC: 37 U/L (ref 0–40)
ANION GAP SERPL CALCULATED.3IONS-SCNC: 10 MMOL/L (ref 7–16)
AST SERPL-CCNC: 23 U/L (ref 0–39)
BILIRUB SERPL-MCNC: 0.7 MG/DL (ref 0–1.2)
BUN BLDV-MCNC: 12 MG/DL (ref 6–20)
CALCIUM SERPL-MCNC: 9.7 MG/DL (ref 8.6–10.2)
CHLORIDE BLD-SCNC: 103 MMOL/L (ref 98–107)
CO2: 26 MMOL/L (ref 22–29)
CREAT SERPL-MCNC: 0.9 MG/DL (ref 0.7–1.2)
GFR AFRICAN AMERICAN: >60
GFR NON-AFRICAN AMERICAN: >60 ML/MIN/1.73
GLUCOSE BLD-MCNC: 108 MG/DL (ref 74–99)
HCT VFR BLD CALC: 48.2 % (ref 37–54)
HEMOGLOBIN: 15.7 G/DL (ref 12.5–16.5)
MCH RBC QN AUTO: 29.5 PG (ref 26–35)
MCHC RBC AUTO-ENTMCNC: 32.6 % (ref 32–34.5)
MCV RBC AUTO: 90.6 FL (ref 80–99.9)
PDW BLD-RTO: 12.5 FL (ref 11.5–15)
PLATELET # BLD: 255 E9/L (ref 130–450)
PMV BLD AUTO: 10.3 FL (ref 7–12)
POTASSIUM REFLEX MAGNESIUM: 4.3 MMOL/L (ref 3.5–5)
RBC # BLD: 5.32 E12/L (ref 3.8–5.8)
SODIUM BLD-SCNC: 139 MMOL/L (ref 132–146)
TOTAL PROTEIN: 7.2 G/DL (ref 6.4–8.3)
WBC # BLD: 5.8 E9/L (ref 4.5–11.5)

## 2021-12-08 PROCEDURE — 80053 COMPREHEN METABOLIC PANEL: CPT

## 2021-12-08 PROCEDURE — 36415 COLL VENOUS BLD VENIPUNCTURE: CPT

## 2021-12-08 PROCEDURE — 85027 COMPLETE CBC AUTOMATED: CPT

## 2021-12-08 NOTE — PROGRESS NOTES
[address] [facility]                                                                                                                    PRE OP INSTRUCTIONS FOR  Gianni Edmonds        Date: 12/8/2021    Date of surgery: 12/14/21   Arrival Time: Hospital will call you between 5pm and 7pm with your final arrival time for surgery    1. Do not eat or drink anything after midnight prior to surgery. This includes no water, chewing gum, mints or ice chips. 2. Take the following medications with a small sip of water on the morning of Surgery: none       3. Aspirin, Ibuprofen, Advil, Naproxen, Vitamin E and other Anti-inflammatory products should be stopped  before surgery  as directed by your physician. Take Tylenol only unless instructed otherwise by your surgeon. 4. Do not smoke,use illicit drugs and do not drink any alcoholic beverages 24 hours prior to surgery. 5. You may brush your teeth the morning of surgery. DO NOT SWALLOW WATER    6. You MUST make arrangements for a responsible adult to take you home after your surgery. You will not be allowed to leave alone or drive yourself home. It is strongly suggested someone stay with you the first 24 hrs. Your surgery will be cancelled if you do not have a ride home. 7. Please wear simple, loose fitting clothing to the hospital.  Nancy Dell not bring valuables (money, credit cards, checkbooks, etc.) Do not wear any makeup (including no eye makeup) or nail polish on your fingers or toes. 8. DO NOT wear any jewelry or piercings on day of surgery. All body piercing jewelry must be removed. 9. Shower the night before surgery with _x__Antibacterial soap /CRISTELA WIPES________    10.  If appropriate bring crutches, inspirex, WALKER, CANE etc...    11. Notify your Surgeon if you develop any illness between now and surgery time, cough, cold, fever, sore throat, nausea, vomiting, etc.  Please notify your surgeon if you experience dizziness, shortness of breath or blurred vision between now & the time of your surgery. 12. To provide excellent care visitors will be limited to 1  in the room at any given time. 13. During flu season no children under the age of 15 are permitted in the hospital for the safety of all patients. 14. Other come in main lobby and stop at                  Please call AMBULATORY CARE if you have any further questions.    [address] [facility]     [address]

## 2021-12-13 ENCOUNTER — ANESTHESIA EVENT (OUTPATIENT)
Dept: OPERATING ROOM | Age: 41
DRG: 621 | End: 2021-12-13
Payer: COMMERCIAL

## 2021-12-13 ASSESSMENT — LIFESTYLE VARIABLES: SMOKING_STATUS: 0

## 2021-12-14 ENCOUNTER — HOSPITAL ENCOUNTER (INPATIENT)
Age: 41
LOS: 1 days | Discharge: HOME | DRG: 621 | End: 2021-12-15
Attending: SURGERY | Admitting: SURGERY
Payer: COMMERCIAL

## 2021-12-14 ENCOUNTER — ANESTHESIA (OUTPATIENT)
Dept: OPERATING ROOM | Age: 41
DRG: 621 | End: 2021-12-14
Payer: COMMERCIAL

## 2021-12-14 VITALS
TEMPERATURE: 97.7 F | DIASTOLIC BLOOD PRESSURE: 72 MMHG | RESPIRATION RATE: 14 BRPM | SYSTOLIC BLOOD PRESSURE: 155 MMHG | OXYGEN SATURATION: 96 %

## 2021-12-14 PROBLEM — Z98.84 S/P GASTRIC BYPASS: Status: ACTIVE | Noted: 2021-12-14

## 2021-12-14 PROCEDURE — 8E0W4CZ ROBOTIC ASSISTED PROCEDURE OF TRUNK REGION, PERCUTANEOUS ENDOSCOPIC APPROACH: ICD-10-PCS | Performed by: SURGERY

## 2021-12-14 PROCEDURE — 2580000003 HC RX 258

## 2021-12-14 PROCEDURE — 7100000000 HC PACU RECOVERY - FIRST 15 MIN: Performed by: SURGERY

## 2021-12-14 PROCEDURE — 2500000003 HC RX 250 WO HCPCS

## 2021-12-14 PROCEDURE — 2720000010 HC SURG SUPPLY STERILE: Performed by: SURGERY

## 2021-12-14 PROCEDURE — 43644 LAP GASTRIC BYPASS/ROUX-EN-Y: CPT | Performed by: SURGERY

## 2021-12-14 PROCEDURE — 6360000002 HC RX W HCPCS

## 2021-12-14 PROCEDURE — 2500000003 HC RX 250 WO HCPCS: Performed by: ANESTHESIOLOGY

## 2021-12-14 PROCEDURE — 6360000002 HC RX W HCPCS: Performed by: SURGERY

## 2021-12-14 PROCEDURE — 3600000009 HC SURGERY ROBOT BASE: Performed by: SURGERY

## 2021-12-14 PROCEDURE — 2500000003 HC RX 250 WO HCPCS: Performed by: SURGERY

## 2021-12-14 PROCEDURE — 1200000000 HC SEMI PRIVATE

## 2021-12-14 PROCEDURE — 0DJ08ZZ INSPECTION OF UPPER INTESTINAL TRACT, VIA NATURAL OR ARTIFICIAL OPENING ENDOSCOPIC: ICD-10-PCS | Performed by: SURGERY

## 2021-12-14 PROCEDURE — 7100000001 HC PACU RECOVERY - ADDTL 15 MIN: Performed by: SURGERY

## 2021-12-14 PROCEDURE — 2580000003 HC RX 258: Performed by: SURGERY

## 2021-12-14 PROCEDURE — 6370000000 HC RX 637 (ALT 250 FOR IP): Performed by: SURGERY

## 2021-12-14 PROCEDURE — 0D164ZA BYPASS STOMACH TO JEJUNUM, PERCUTANEOUS ENDOSCOPIC APPROACH: ICD-10-PCS | Performed by: SURGERY

## 2021-12-14 PROCEDURE — 3700000001 HC ADD 15 MINUTES (ANESTHESIA): Performed by: SURGERY

## 2021-12-14 PROCEDURE — S2900 ROBOTIC SURGICAL SYSTEM: HCPCS | Performed by: SURGERY

## 2021-12-14 PROCEDURE — 49905 OMENTAL FLAP INTRA-ABDOM: CPT | Performed by: SURGERY

## 2021-12-14 PROCEDURE — 3600000019 HC SURGERY ROBOT ADDTL 15MIN: Performed by: SURGERY

## 2021-12-14 PROCEDURE — 88305 TISSUE EXAM BY PATHOLOGIST: CPT

## 2021-12-14 PROCEDURE — 3700000000 HC ANESTHESIA ATTENDED CARE: Performed by: SURGERY

## 2021-12-14 PROCEDURE — 2709999900 HC NON-CHARGEABLE SUPPLY: Performed by: SURGERY

## 2021-12-14 RX ORDER — PROMETHAZINE HYDROCHLORIDE 25 MG/ML
6.25 INJECTION, SOLUTION INTRAMUSCULAR; INTRAVENOUS
Status: COMPLETED | OUTPATIENT
Start: 2021-12-14 | End: 2021-12-14

## 2021-12-14 RX ORDER — MEPERIDINE HYDROCHLORIDE 25 MG/ML
INJECTION INTRAMUSCULAR; INTRAVENOUS; SUBCUTANEOUS
Status: COMPLETED
Start: 2021-12-14 | End: 2021-12-14

## 2021-12-14 RX ORDER — ROCURONIUM BROMIDE 10 MG/ML
INJECTION, SOLUTION INTRAVENOUS PRN
Status: DISCONTINUED | OUTPATIENT
Start: 2021-12-14 | End: 2021-12-14 | Stop reason: SDUPTHER

## 2021-12-14 RX ORDER — SODIUM CHLORIDE 9 MG/ML
INJECTION, SOLUTION INTRAVENOUS CONTINUOUS
Status: DISCONTINUED | OUTPATIENT
Start: 2021-12-14 | End: 2021-12-14

## 2021-12-14 RX ORDER — LABETALOL HYDROCHLORIDE 5 MG/ML
5 INJECTION, SOLUTION INTRAVENOUS EVERY 10 MIN PRN
Status: DISCONTINUED | OUTPATIENT
Start: 2021-12-14 | End: 2021-12-14 | Stop reason: HOSPADM

## 2021-12-14 RX ORDER — PROPOFOL 10 MG/ML
INJECTION, EMULSION INTRAVENOUS PRN
Status: DISCONTINUED | OUTPATIENT
Start: 2021-12-14 | End: 2021-12-14 | Stop reason: SDUPTHER

## 2021-12-14 RX ORDER — ONDANSETRON 2 MG/ML
4 INJECTION INTRAMUSCULAR; INTRAVENOUS EVERY 6 HOURS PRN
Status: DISCONTINUED | OUTPATIENT
Start: 2021-12-14 | End: 2021-12-15 | Stop reason: HOSPADM

## 2021-12-14 RX ORDER — ACETAMINOPHEN 500 MG
1000 TABLET ORAL ONCE
Status: COMPLETED | OUTPATIENT
Start: 2021-12-14 | End: 2021-12-14

## 2021-12-14 RX ORDER — SODIUM CHLORIDE 0.9 % (FLUSH) 0.9 %
5-40 SYRINGE (ML) INJECTION EVERY 12 HOURS SCHEDULED
Status: DISCONTINUED | OUTPATIENT
Start: 2021-12-14 | End: 2021-12-14 | Stop reason: HOSPADM

## 2021-12-14 RX ORDER — SODIUM CHLORIDE 0.9 % (FLUSH) 0.9 %
5-40 SYRINGE (ML) INJECTION EVERY 12 HOURS SCHEDULED
Status: DISCONTINUED | OUTPATIENT
Start: 2021-12-14 | End: 2021-12-15 | Stop reason: HOSPADM

## 2021-12-14 RX ORDER — ONDANSETRON 2 MG/ML
INJECTION INTRAMUSCULAR; INTRAVENOUS PRN
Status: DISCONTINUED | OUTPATIENT
Start: 2021-12-14 | End: 2021-12-14 | Stop reason: SDUPTHER

## 2021-12-14 RX ORDER — SCOLOPAMINE TRANSDERMAL SYSTEM 1 MG/1
1 PATCH, EXTENDED RELEASE TRANSDERMAL
Status: DISCONTINUED | OUTPATIENT
Start: 2021-12-14 | End: 2021-12-14

## 2021-12-14 RX ORDER — SODIUM CHLORIDE 0.9 % (FLUSH) 0.9 %
5-40 SYRINGE (ML) INJECTION PRN
Status: DISCONTINUED | OUTPATIENT
Start: 2021-12-14 | End: 2021-12-14 | Stop reason: SDUPTHER

## 2021-12-14 RX ORDER — MEPERIDINE HYDROCHLORIDE 25 MG/ML
12.5 INJECTION INTRAMUSCULAR; INTRAVENOUS; SUBCUTANEOUS EVERY 5 MIN PRN
Status: DISCONTINUED | OUTPATIENT
Start: 2021-12-14 | End: 2021-12-14 | Stop reason: HOSPADM

## 2021-12-14 RX ORDER — OXYCODONE HYDROCHLORIDE AND ACETAMINOPHEN 5; 325 MG/1; MG/1
1 TABLET ORAL EVERY 4 HOURS PRN
Status: DISCONTINUED | OUTPATIENT
Start: 2021-12-14 | End: 2021-12-14 | Stop reason: HOSPADM

## 2021-12-14 RX ORDER — PROMETHAZINE HYDROCHLORIDE 25 MG/1
25 SUPPOSITORY RECTAL EVERY 6 HOURS PRN
Status: DISCONTINUED | OUTPATIENT
Start: 2021-12-14 | End: 2021-12-15 | Stop reason: HOSPADM

## 2021-12-14 RX ORDER — SODIUM CHLORIDE 9 MG/ML
25 INJECTION, SOLUTION INTRAVENOUS PRN
Status: DISCONTINUED | OUTPATIENT
Start: 2021-12-14 | End: 2021-12-14 | Stop reason: HOSPADM

## 2021-12-14 RX ORDER — HYDRALAZINE HYDROCHLORIDE 20 MG/ML
5 INJECTION INTRAMUSCULAR; INTRAVENOUS EVERY 10 MIN PRN
Status: DISCONTINUED | OUTPATIENT
Start: 2021-12-14 | End: 2021-12-14 | Stop reason: HOSPADM

## 2021-12-14 RX ORDER — PROCHLORPERAZINE EDISYLATE 5 MG/ML
10 INJECTION INTRAMUSCULAR; INTRAVENOUS EVERY 6 HOURS PRN
Status: DISCONTINUED | OUTPATIENT
Start: 2021-12-14 | End: 2021-12-15 | Stop reason: HOSPADM

## 2021-12-14 RX ORDER — MIDAZOLAM HYDROCHLORIDE 1 MG/ML
INJECTION INTRAMUSCULAR; INTRAVENOUS PRN
Status: DISCONTINUED | OUTPATIENT
Start: 2021-12-14 | End: 2021-12-14 | Stop reason: SDUPTHER

## 2021-12-14 RX ORDER — SODIUM CHLORIDE 9 MG/ML
25 INJECTION, SOLUTION INTRAVENOUS PRN
Status: DISCONTINUED | OUTPATIENT
Start: 2021-12-14 | End: 2021-12-15 | Stop reason: HOSPADM

## 2021-12-14 RX ORDER — SODIUM CHLORIDE, SODIUM LACTATE, POTASSIUM CHLORIDE, CALCIUM CHLORIDE 600; 310; 30; 20 MG/100ML; MG/100ML; MG/100ML; MG/100ML
INJECTION, SOLUTION INTRAVENOUS CONTINUOUS
Status: DISCONTINUED | OUTPATIENT
Start: 2021-12-14 | End: 2021-12-15 | Stop reason: HOSPADM

## 2021-12-14 RX ORDER — SCOLOPAMINE TRANSDERMAL SYSTEM 1 MG/1
1 PATCH, EXTENDED RELEASE TRANSDERMAL
Status: DISCONTINUED | OUTPATIENT
Start: 2021-12-14 | End: 2021-12-15 | Stop reason: HOSPADM

## 2021-12-14 RX ORDER — HYDRALAZINE HYDROCHLORIDE 20 MG/ML
INJECTION INTRAMUSCULAR; INTRAVENOUS
Status: COMPLETED
Start: 2021-12-14 | End: 2021-12-14

## 2021-12-14 RX ORDER — FAMOTIDINE 20 MG/1
20 TABLET, FILM COATED ORAL 2 TIMES DAILY
Status: DISCONTINUED | OUTPATIENT
Start: 2021-12-14 | End: 2021-12-15 | Stop reason: HOSPADM

## 2021-12-14 RX ORDER — FENTANYL CITRATE 50 UG/ML
INJECTION, SOLUTION INTRAMUSCULAR; INTRAVENOUS PRN
Status: DISCONTINUED | OUTPATIENT
Start: 2021-12-14 | End: 2021-12-14 | Stop reason: SDUPTHER

## 2021-12-14 RX ORDER — LABETALOL HYDROCHLORIDE 5 MG/ML
INJECTION, SOLUTION INTRAVENOUS
Status: COMPLETED
Start: 2021-12-14 | End: 2021-12-14

## 2021-12-14 RX ORDER — PROCHLORPERAZINE EDISYLATE 5 MG/ML
5 INJECTION INTRAMUSCULAR; INTRAVENOUS
Status: DISCONTINUED | OUTPATIENT
Start: 2021-12-14 | End: 2021-12-14 | Stop reason: HOSPADM

## 2021-12-14 RX ORDER — DIPHENHYDRAMINE HYDROCHLORIDE 50 MG/ML
12.5 INJECTION INTRAMUSCULAR; INTRAVENOUS
Status: DISCONTINUED | OUTPATIENT
Start: 2021-12-14 | End: 2021-12-14 | Stop reason: HOSPADM

## 2021-12-14 RX ORDER — DEXAMETHASONE SODIUM PHOSPHATE 4 MG/ML
INJECTION, SOLUTION INTRA-ARTICULAR; INTRALESIONAL; INTRAMUSCULAR; INTRAVENOUS; SOFT TISSUE PRN
Status: DISCONTINUED | OUTPATIENT
Start: 2021-12-14 | End: 2021-12-14 | Stop reason: SDUPTHER

## 2021-12-14 RX ORDER — ACETAMINOPHEN 160 MG/5ML
650 SUSPENSION, ORAL (FINAL DOSE FORM) ORAL EVERY 6 HOURS
Status: DISCONTINUED | OUTPATIENT
Start: 2021-12-14 | End: 2021-12-15 | Stop reason: HOSPADM

## 2021-12-14 RX ORDER — SUCCINYLCHOLINE/SOD CL,ISO/PF 200MG/10ML
SYRINGE (ML) INTRAVENOUS PRN
Status: DISCONTINUED | OUTPATIENT
Start: 2021-12-14 | End: 2021-12-14 | Stop reason: SDUPTHER

## 2021-12-14 RX ORDER — LIDOCAINE HYDROCHLORIDE 20 MG/ML
INJECTION, SOLUTION INTRAVENOUS PRN
Status: DISCONTINUED | OUTPATIENT
Start: 2021-12-14 | End: 2021-12-14 | Stop reason: SDUPTHER

## 2021-12-14 RX ORDER — NEOSTIGMINE METHYLSULFATE 1 MG/ML
INJECTION, SOLUTION INTRAVENOUS PRN
Status: DISCONTINUED | OUTPATIENT
Start: 2021-12-14 | End: 2021-12-14 | Stop reason: SDUPTHER

## 2021-12-14 RX ORDER — SODIUM CHLORIDE 0.9 % (FLUSH) 0.9 %
5-40 SYRINGE (ML) INJECTION PRN
Status: DISCONTINUED | OUTPATIENT
Start: 2021-12-14 | End: 2021-12-15 | Stop reason: HOSPADM

## 2021-12-14 RX ORDER — MORPHINE SULFATE 2 MG/ML
2 INJECTION, SOLUTION INTRAMUSCULAR; INTRAVENOUS
Status: DISCONTINUED | OUTPATIENT
Start: 2021-12-14 | End: 2021-12-15 | Stop reason: HOSPADM

## 2021-12-14 RX ORDER — SODIUM CHLORIDE 9 MG/ML
INJECTION, SOLUTION INTRAVENOUS CONTINUOUS PRN
Status: DISCONTINUED | OUTPATIENT
Start: 2021-12-14 | End: 2021-12-14 | Stop reason: SDUPTHER

## 2021-12-14 RX ORDER — KETOROLAC TROMETHAMINE 30 MG/ML
30 INJECTION, SOLUTION INTRAMUSCULAR; INTRAVENOUS EVERY 6 HOURS
Status: DISCONTINUED | OUTPATIENT
Start: 2021-12-14 | End: 2021-12-15 | Stop reason: HOSPADM

## 2021-12-14 RX ORDER — SODIUM CHLORIDE 0.9 % (FLUSH) 0.9 %
5-40 SYRINGE (ML) INJECTION EVERY 12 HOURS SCHEDULED
Status: DISCONTINUED | OUTPATIENT
Start: 2021-12-14 | End: 2021-12-14 | Stop reason: SDUPTHER

## 2021-12-14 RX ORDER — GLYCOPYRROLATE 1 MG/5 ML
SYRINGE (ML) INTRAVENOUS PRN
Status: DISCONTINUED | OUTPATIENT
Start: 2021-12-14 | End: 2021-12-14 | Stop reason: SDUPTHER

## 2021-12-14 RX ORDER — OXYCODONE HCL 20 MG/ML
5 CONCENTRATE, ORAL ORAL EVERY 4 HOURS PRN
Status: DISCONTINUED | OUTPATIENT
Start: 2021-12-14 | End: 2021-12-15 | Stop reason: HOSPADM

## 2021-12-14 RX ORDER — PROMETHAZINE HYDROCHLORIDE 25 MG/ML
6.25 INJECTION, SOLUTION INTRAMUSCULAR; INTRAVENOUS EVERY 6 HOURS PRN
Status: DISCONTINUED | OUTPATIENT
Start: 2021-12-14 | End: 2021-12-15 | Stop reason: HOSPADM

## 2021-12-14 RX ORDER — SODIUM CHLORIDE 0.9 % (FLUSH) 0.9 %
5-40 SYRINGE (ML) INJECTION PRN
Status: DISCONTINUED | OUTPATIENT
Start: 2021-12-14 | End: 2021-12-14 | Stop reason: HOSPADM

## 2021-12-14 RX ORDER — BUPIVACAINE HYDROCHLORIDE AND EPINEPHRINE 2.5; 5 MG/ML; UG/ML
INJECTION, SOLUTION EPIDURAL; INFILTRATION; INTRACAUDAL; PERINEURAL PRN
Status: DISCONTINUED | OUTPATIENT
Start: 2021-12-14 | End: 2021-12-14 | Stop reason: ALTCHOICE

## 2021-12-14 RX ORDER — PROMETHAZINE HYDROCHLORIDE 25 MG/ML
INJECTION, SOLUTION INTRAMUSCULAR; INTRAVENOUS
Status: COMPLETED
Start: 2021-12-14 | End: 2021-12-14

## 2021-12-14 RX ORDER — OXYCODONE HCL 5 MG/5 ML
5 SOLUTION, ORAL ORAL EVERY 4 HOURS PRN
Status: DISCONTINUED | OUTPATIENT
Start: 2021-12-14 | End: 2021-12-14 | Stop reason: CLARIF

## 2021-12-14 RX ADMIN — SODIUM CHLORIDE: 9 INJECTION, SOLUTION INTRAVENOUS at 08:31

## 2021-12-14 RX ADMIN — FENTANYL CITRATE 50 MCG: 50 INJECTION, SOLUTION INTRAMUSCULAR; INTRAVENOUS at 08:10

## 2021-12-14 RX ADMIN — ACETAMINOPHEN 650 MG: 160 SUSPENSION ORAL at 12:33

## 2021-12-14 RX ADMIN — Medication 200 MG: at 07:50

## 2021-12-14 RX ADMIN — FENTANYL CITRATE 50 MCG: 50 INJECTION, SOLUTION INTRAMUSCULAR; INTRAVENOUS at 09:06

## 2021-12-14 RX ADMIN — PROMETHAZINE HYDROCHLORIDE 6.25 MG: 25 INJECTION INTRAMUSCULAR; INTRAVENOUS at 10:25

## 2021-12-14 RX ADMIN — PROMETHAZINE HYDROCHLORIDE 6.25 MG: 25 INJECTION, SOLUTION INTRAMUSCULAR; INTRAVENOUS at 10:25

## 2021-12-14 RX ADMIN — Medication 0.6 MG: at 09:15

## 2021-12-14 RX ADMIN — Medication 0.5 MG: at 10:01

## 2021-12-14 RX ADMIN — LABETALOL HYDROCHLORIDE 5 MG: 5 INJECTION INTRAVENOUS at 10:29

## 2021-12-14 RX ADMIN — ACETAMINOPHEN 1000 MG: 500 TABLET ORAL at 07:29

## 2021-12-14 RX ADMIN — OXYCODONE HYDROCHLORIDE 5 MG: 100 SOLUTION ORAL at 16:04

## 2021-12-14 RX ADMIN — ROCURONIUM BROMIDE 10 MG: 10 SOLUTION INTRAVENOUS at 07:51

## 2021-12-14 RX ADMIN — Medication 0.5 MG: at 10:09

## 2021-12-14 RX ADMIN — FENTANYL CITRATE 75 MCG: 50 INJECTION, SOLUTION INTRAMUSCULAR; INTRAVENOUS at 08:06

## 2021-12-14 RX ADMIN — ONDANSETRON 4 MG: 2 INJECTION INTRAMUSCULAR; INTRAVENOUS at 08:48

## 2021-12-14 RX ADMIN — ENOXAPARIN SODIUM 40 MG: 100 INJECTION SUBCUTANEOUS at 07:29

## 2021-12-14 RX ADMIN — FAMOTIDINE 20 MG: 10 INJECTION INTRAVENOUS at 21:20

## 2021-12-14 RX ADMIN — HYDRALAZINE HYDROCHLORIDE 5 MG: 20 INJECTION INTRAMUSCULAR; INTRAVENOUS at 11:36

## 2021-12-14 RX ADMIN — DEXAMETHASONE SODIUM PHOSPHATE 10 MG: 4 INJECTION, SOLUTION INTRAMUSCULAR; INTRAVENOUS at 07:53

## 2021-12-14 RX ADMIN — Medication 0.5 MG: at 09:51

## 2021-12-14 RX ADMIN — SODIUM CHLORIDE: 9 INJECTION, SOLUTION INTRAVENOUS at 07:20

## 2021-12-14 RX ADMIN — FAMOTIDINE 20 MG: 10 INJECTION INTRAVENOUS at 07:29

## 2021-12-14 RX ADMIN — HYDROMORPHONE HYDROCHLORIDE 0.5 MG: 1 INJECTION, SOLUTION INTRAMUSCULAR; INTRAVENOUS; SUBCUTANEOUS at 09:51

## 2021-12-14 RX ADMIN — PHENYLEPHRINE HYDROCHLORIDE 200 MCG: 10 INJECTION INTRAVENOUS at 08:16

## 2021-12-14 RX ADMIN — MEPERIDINE HYDROCHLORIDE 12.5 MG: 25 INJECTION INTRAMUSCULAR; INTRAVENOUS; SUBCUTANEOUS at 10:15

## 2021-12-14 RX ADMIN — FENTANYL CITRATE 75 MCG: 50 INJECTION, SOLUTION INTRAMUSCULAR; INTRAVENOUS at 07:47

## 2021-12-14 RX ADMIN — HYDROMORPHONE HYDROCHLORIDE 0.5 MG: 1 INJECTION, SOLUTION INTRAMUSCULAR; INTRAVENOUS; SUBCUTANEOUS at 10:01

## 2021-12-14 RX ADMIN — KETOROLAC TROMETHAMINE 30 MG: 30 INJECTION, SOLUTION INTRAMUSCULAR; INTRAVENOUS at 12:33

## 2021-12-14 RX ADMIN — HYDROMORPHONE HYDROCHLORIDE 0.5 MG: 1 INJECTION, SOLUTION INTRAMUSCULAR; INTRAVENOUS; SUBCUTANEOUS at 10:09

## 2021-12-14 RX ADMIN — SODIUM CHLORIDE, POTASSIUM CHLORIDE, SODIUM LACTATE AND CALCIUM CHLORIDE: 600; 310; 30; 20 INJECTION, SOLUTION INTRAVENOUS at 12:32

## 2021-12-14 RX ADMIN — Medication 0.5 MG: at 10:46

## 2021-12-14 RX ADMIN — LIDOCAINE HYDROCHLORIDE 100 MG: 20 INJECTION, SOLUTION INTRAVENOUS at 07:48

## 2021-12-14 RX ADMIN — KETOROLAC TROMETHAMINE 30 MG: 30 INJECTION, SOLUTION INTRAMUSCULAR; INTRAVENOUS at 21:20

## 2021-12-14 RX ADMIN — MIDAZOLAM 2 MG: 1 INJECTION INTRAMUSCULAR; INTRAVENOUS at 07:41

## 2021-12-14 RX ADMIN — Medication 3 MG: at 09:15

## 2021-12-14 RX ADMIN — BENZOCAINE AND MENTHOL 1 LOZENGE: 15; 3.6 LOZENGE ORAL at 12:33

## 2021-12-14 RX ADMIN — ROCURONIUM BROMIDE 40 MG: 10 SOLUTION INTRAVENOUS at 08:01

## 2021-12-14 RX ADMIN — HYDROMORPHONE HYDROCHLORIDE 0.5 MG: 1 INJECTION, SOLUTION INTRAMUSCULAR; INTRAVENOUS; SUBCUTANEOUS at 10:46

## 2021-12-14 RX ADMIN — PROCHLORPERAZINE EDISYLATE 10 MG: 5 INJECTION INTRAMUSCULAR; INTRAVENOUS at 15:37

## 2021-12-14 RX ADMIN — PHENYLEPHRINE HYDROCHLORIDE 200 MCG: 10 INJECTION INTRAVENOUS at 08:14

## 2021-12-14 RX ADMIN — CEFAZOLIN SODIUM 3000 MG: 10 INJECTION, POWDER, FOR SOLUTION INTRAVENOUS at 07:59

## 2021-12-14 RX ADMIN — MEPERIDINE HYDROCHLORIDE 12.5 MG: 25 INJECTION INTRAMUSCULAR; INTRAVENOUS; SUBCUTANEOUS at 10:20

## 2021-12-14 RX ADMIN — PROPOFOL 200 MG: 10 INJECTION, EMULSION INTRAVENOUS at 07:49

## 2021-12-14 RX ADMIN — SODIUM CHLORIDE: 9 INJECTION, SOLUTION INTRAVENOUS at 07:41

## 2021-12-14 RX ADMIN — LABETALOL HYDROCHLORIDE 5 MG: 5 INJECTION INTRAVENOUS at 11:08

## 2021-12-14 RX ADMIN — LABETALOL HYDROCHLORIDE 5 MG: 5 INJECTION INTRAVENOUS at 10:59

## 2021-12-14 ASSESSMENT — PULMONARY FUNCTION TESTS
PIF_VALUE: 30
PIF_VALUE: 30
PIF_VALUE: 29
PIF_VALUE: 31
PIF_VALUE: 26
PIF_VALUE: 30
PIF_VALUE: 2
PIF_VALUE: 29
PIF_VALUE: 3
PIF_VALUE: 30
PIF_VALUE: 30
PIF_VALUE: 28
PIF_VALUE: 30
PIF_VALUE: 31
PIF_VALUE: 2
PIF_VALUE: 30
PIF_VALUE: 29
PIF_VALUE: 30
PIF_VALUE: 31
PIF_VALUE: 31
PIF_VALUE: 29
PIF_VALUE: 3
PIF_VALUE: 29
PIF_VALUE: 0
PIF_VALUE: 20
PIF_VALUE: 30
PIF_VALUE: 31
PIF_VALUE: 29
PIF_VALUE: 24
PIF_VALUE: 3
PIF_VALUE: 28
PIF_VALUE: 29
PIF_VALUE: 31
PIF_VALUE: 30
PIF_VALUE: 30
PIF_VALUE: 31
PIF_VALUE: 29
PIF_VALUE: 32
PIF_VALUE: 28
PIF_VALUE: 30
PIF_VALUE: 0
PIF_VALUE: 30
PIF_VALUE: 28
PIF_VALUE: 13
PIF_VALUE: 31
PIF_VALUE: 25
PIF_VALUE: 30
PIF_VALUE: 27
PIF_VALUE: 27
PIF_VALUE: 31
PIF_VALUE: 29
PIF_VALUE: 30
PIF_VALUE: 30
PIF_VALUE: 29
PIF_VALUE: 30
PIF_VALUE: 29
PIF_VALUE: 30
PIF_VALUE: 1
PIF_VALUE: 30
PIF_VALUE: 29
PIF_VALUE: 25
PIF_VALUE: 28
PIF_VALUE: 31
PIF_VALUE: 30
PIF_VALUE: 29
PIF_VALUE: 3
PIF_VALUE: 32
PIF_VALUE: 27
PIF_VALUE: 3
PIF_VALUE: 25
PIF_VALUE: 20
PIF_VALUE: 0
PIF_VALUE: 29
PIF_VALUE: 30
PIF_VALUE: 29
PIF_VALUE: 20
PIF_VALUE: 30
PIF_VALUE: 30
PIF_VALUE: 29
PIF_VALUE: 28
PIF_VALUE: 2
PIF_VALUE: 27
PIF_VALUE: 30
PIF_VALUE: 1
PIF_VALUE: 29
PIF_VALUE: 30
PIF_VALUE: 2
PIF_VALUE: 3
PIF_VALUE: 24
PIF_VALUE: 28
PIF_VALUE: 27
PIF_VALUE: 28
PIF_VALUE: 29
PIF_VALUE: 31
PIF_VALUE: 1
PIF_VALUE: 30
PIF_VALUE: 30

## 2021-12-14 ASSESSMENT — PAIN SCALES - GENERAL
PAINLEVEL_OUTOF10: 6
PAINLEVEL_OUTOF10: 10
PAINLEVEL_OUTOF10: 6
PAINLEVEL_OUTOF10: 7
PAINLEVEL_OUTOF10: 7
PAINLEVEL_OUTOF10: 5
PAINLEVEL_OUTOF10: 0
PAINLEVEL_OUTOF10: 10
PAINLEVEL_OUTOF10: 6
PAINLEVEL_OUTOF10: 9
PAINLEVEL_OUTOF10: 0
PAINLEVEL_OUTOF10: 7
PAINLEVEL_OUTOF10: 0

## 2021-12-14 ASSESSMENT — PAIN DESCRIPTION - ONSET
ONSET: ON-GOING
ONSET: AWAKENED FROM SLEEP
ONSET: AWAKENED FROM SLEEP

## 2021-12-14 ASSESSMENT — PAIN DESCRIPTION - PAIN TYPE
TYPE: SURGICAL PAIN

## 2021-12-14 ASSESSMENT — PAIN DESCRIPTION - PROGRESSION: CLINICAL_PROGRESSION: NOT CHANGED

## 2021-12-14 ASSESSMENT — PAIN DESCRIPTION - FREQUENCY
FREQUENCY: CONTINUOUS
FREQUENCY: CONTINUOUS

## 2021-12-14 ASSESSMENT — PAIN DESCRIPTION - LOCATION
LOCATION: ABDOMEN

## 2021-12-14 ASSESSMENT — PAIN DESCRIPTION - DESCRIPTORS
DESCRIPTORS: ACHING;PRESSURE
DESCRIPTORS: DISCOMFORT;ACHING;SORE

## 2021-12-14 ASSESSMENT — PAIN DESCRIPTION - ORIENTATION: ORIENTATION: UPPER

## 2021-12-14 ASSESSMENT — PAIN - FUNCTIONAL ASSESSMENT: PAIN_FUNCTIONAL_ASSESSMENT: 0-10

## 2021-12-14 NOTE — OP NOTE
[address] [facility]                [facility]    Operative Report  Brenda Malave MD, MS    DATE OF PROCEDURE: 12/14/2021    SURGEON: Dr. Sincere Choe MD, M.D.     Quinten Dean: Catrachito Hernandez MD (No qualified surgical resident or surgical assistant was available for the case); Cheryl Linn DO    PREOPERATIVE DIAGNOSES:   Patient Active Problem List   Diagnosis    Irritable bowel syndrome    Obesity    Tobacco use    Prehypertension    Low serum HDL    S/P gastric bypass       Morbid obesity     POSTOPERATIVE DIAGNOSES:   Same      OPERATION:   1) Laparoscopic Robot assisted Devika-en-Y gastric bypass   2) Upper endoscopy   3) Omental flap          ANESTHESIA: General endotracheal.     ESTIMATED BLOOD LOSS: 10 mL. COMPLICATIONS: None. HISTORY: Kellie Fraser is a morbidly-obese 39 y.o.  male, who weighs 415 pounds. The Body mass index is 53.28 kg/m². He has multiple medical problems aggravated by his obesity and a hiatal hernia causing reflux. He wishes to have a gastric bypass so that he can lose more weight and keep the weight off and potentially have the hiatal hernia repaired to help decrease the reflux problems. He understands the extensive risks involved in the surgery and wishes to proceed. PROCEDURE: The patient was placed on the table in the supine position and placed under general endotracheal anesthesia. They had SCDs on the legs as DVT prophylaxis. They had Ancef IV. Orogastric tube placed in the stomach, then removed. The abdomen was clipped, then prepped with DuraPrep and draped in the usual sterile fashion. Then, 0.25% Marcaine plus epinephrine was injected into the skin and muscle of each incision to help with postoperative pain control. An 8mm incision was made above the umbilicus and veress needle inserted and confirmed its placement. The abdomen was insufflated to 15mmHg, the needle removed and 8mm trocar inserted.  The camera was inserted and the abdomen inspected. A 12 mm trocar was placed in the right mid abdomen, an 8-mm trocar in the left midabdomen, 8-mm trocar in the right lateral subcostal region. The head of the bed was elevated. Britt Penta liver retractor was placed below the xiphoid. The liver was large and fatty. The liver was retracted methodically. The robot was then docked over the left side. Pars flaccida clear area was opened with synchroseal. The hiatus was inspected and no hiatal hernia was appreciated. The lesser omental vessels were taken down to the lesser curvature of the stomach. The Robotic 60mm blue cartridge was fired horizontally across the stomach to start the pouch. Three more firings vertically out through an opening at the angle of HIS. Pouch size approximately 30 mL. The ligament of Treitz was identified and measured 80 cm and brought up to the stomach pouch in omega loop fashion. The jejunum was sewn to the stomach with a V lock absorbable suture along the back wall of the anastomosis. Hook cautery was used to make a small hole in the anterior wall at the distal gastric staple line and a enterotomy was made in the small bowel opposite to the gastrostomy also with hook cautery. We then insterted a 39 F bougie was placed by Anaesthesia through the anastomosis after the stomach and jejunum were opened with hook catery. The anastamosis was then completed with a second absorbable barbed suture. Vessel sealer was used to make a small opening in the mesentery of the biliary limb and it was then divided with the Robotic white cartridge lateral to the gastric anastomosis. The Bougie was removed. Hook cautery was used to make an enterotomy 5cm from the staple line in the biliary limb. The small bowel was then run 150 cm from the gastrojejunal anastomosis and a loop of jejunum was brought up for the distal anastomosis using the triple-staple technique. Hook cautery was used to make a small opening in common limb. The Robotic 60 white cartridge was fired, inside the lumen in opposite directions. The enterotomy was closed transversely with a Robotic 60 blue cartridge, finishing the distal anastomosis. The mesenteric defect was closed with a running absorbable barbed suture to prevent internal hernias. The abdomen was filled with saline. A bowel clamp was placed on the jejunum distal to the gastrojejunal anastomosis. An Endoscope was passed down through the mouth. The scope fit past the hiatus without problems and again no hiatal hernia was appreciated. The scope was pushed into the pouch. Old blood was removed with suction. The pouch was inflated with air. There was no bubbling from any of the staple lines indicating they were airtight and watertight. The anastomosis was open approximately 9 mm. The scope was easily passed through the anastomosis into the jejunum. All of the mucosa looked healthy. The scope was withdrawn. Omentum was mobilized from the greater curvature and the transverse colon to reach the gastrojejunal anastomosis. The anastomosis was wrapped with omentum and tacked with a v lock suture. The omentum was mobilized to preserve blood supply and wrapped 360 degrees around the anastomosis to buttress the closure. All of the fluid in the abdomen was removed with suction. All of the CO2 was released. The trocars were removed. Skin wounds were closed with 4-0 Monocryl dermal stitches and Derma+flex glue was applied. The needle and sponge count were correct. The patient tolerated the procedure well and went to recovery in stable condition. Dr Estefania Arteaga was critical in hiatal dissection and performing the anastomosis and upper endoscopy. His assistance was critical in completing the procedure.     Rachelle Lozano MD

## 2021-12-14 NOTE — PROGRESS NOTES
Patient ambulated to and from bathroom in room. Patient sat in chair for 45 minutes. Patient stated that he was nauseous and light headed. Patient returned to bed. Will attempt to ambulate patient again later this evening.

## 2021-12-14 NOTE — CONSULTS
Department of Internal Medicine  Internal Medicine Consultation Note    Primary Care Physician: Rose Marie Choi DO   Admitting Physician:  Gianna Humphrey MD  Admission date and time: 12/14/2021  6:29 AM    Room:  OR POOL/NONE  Admitting diagnosis: S/P gastric bypass [Z98.84]    Patient Name: Arnel Michaels  MRN: [id number]    Date of Service: 12/14/2021     Reason for consultation:  Medical management    HISTORY OF PRESENT ILLNESS:    This is a 44-year-old male patient who presented to [address] [place] [address] for planned gastric bypass. He was at his baseline state of health prior to the procedure. He denies any known sick contacts or exposures and is fully vaccinated against Covid with booster. He is seen in the PACU following the procedure. He is having discomfort as to be expected but otherwise has no complaints. PAST MEDICAL Hx:  Past Medical History:   Diagnosis Date    Irritable bowel syndrome     Morbid (severe) obesity due to excess calories (HCC)     Obesity     Prehypertension     Tobacco use     quit 10/2020       PAST SURGICAL Hx:   Past Surgical History:   Procedure Laterality Date    COLONOSCOPY  2009    normal    UPPER GASTROINTESTINAL ENDOSCOPY N/A 8/24/2021    EGD BIOPSY performed by Gianna Humphrey MD at [address] [facility] Hx:  Family History   Problem Relation Age of Onset    Diabetes Mother     High Blood Pressure Mother     Diabetes Father     Other Father         aneurysm    Other Sister         hx gastric bypass    Diabetes Sister        HOME MEDICATIONS:  Prior to Admission medications    Medication Sig Start Date End Date Taking?  Authorizing Provider   omeprazole (PRILOSEC) 20 MG delayed release capsule Take 1 capsule by mouth daily 12/3/21   Gianna Humphrey MD   ondansetron (ZOFRAN-ODT) 4 MG disintegrating tablet Place 1 tablet under the tongue every 8 hours as needed for Nausea or Vomiting 12/3/21   Gianna Humphrey MD   Multiple Vitamin (MULTI VITAMIN DAILY PO) Take by mouth daily    Historical Provider, MD   Vitamin D (CHOLECALCIFEROL) 25 MCG (1000 UT) TABS tablet Take 1,000 Units by mouth daily    Historical Provider, MD       ALLERGIES:  Adhesive tape and Lactose intolerance (gi)    SOCIAL Hx:  Social History     Socioeconomic History    Marital status:      Spouse name: Not on file    Number of children: Not on file    Years of education: Not on file    Highest education level: Not on file   Occupational History    Not on file   Tobacco Use    Smoking status: Former Smoker     Packs/day: 0.50     Years: 12.00     Pack years: 6.00     Types: Cigarettes     Quit date: 2020     Years since quittin.2    Smokeless tobacco: Never Used   Vaping Use    Vaping Use: Former    Substances: Nicotine   Substance and Sexual Activity    Alcohol use: Not Currently    Drug use: Never    Sexual activity: Yes     Partners: Female   Other Topics Concern    Not on file   Social History Narrative    Drinks 2-3 cups of coffee daily. Social Determinants of Health     Financial Resource Strain:     Difficulty of Paying Living Expenses: Not on file   Food Insecurity:     Worried About Running Out of Food in the Last Year: Not on file    Moy of Food in the Last Year: Not on file   Transportation Needs:     Lack of Transportation (Medical): Not on file    Lack of Transportation (Non-Medical):  Not on file   Physical Activity:     Days of Exercise per Week: Not on file    Minutes of Exercise per Session: Not on file   Stress:     Feeling of Stress : Not on file   Social Connections:     Frequency of Communication with Friends and Family: Not on file    Frequency of Social Gatherings with Friends and Family: Not on file    Attends Voodoo Services: Not on file    Active Member of Clubs or Organizations: Not on file    Attends Club or Organization Meetings: Not on file    Marital Status: Not on file   Intimate Partner Violence:     Fear of Current or Ex-Partner: Not on file    Emotionally Abused: Not on file    Physically Abused: Not on file    Sexually Abused: Not on file   Housing Stability:     Unable to Pay for Housing in the Last Year: Not on file    Number of Places Lived in the Last Year: Not on file    Unstable Housing in the Last Year: Not on file       ROS:  General:   Mildly drowsy under the recent administration of pain medication and anesthesia, otherwise denies fevers, chills or symptoms of illness    Psychological:   Denies anxiety, disorientation or hallucinations    ENT:    Denies epistaxis, headaches, vertigo or visual changes    Cardiovascular:   Denies any chest pain, irregular heartbeats, or palpitations. No paroxysmal nocturnal dyspnea. Respiratory:   Denies shortness of breath, coughing, sputum production, hemoptysis, or wheezing. No orthopnea. Gastrointestinal:   Postoperative abdominal pain as to be expected. Controlled nausea. No vomiting. Has not yet passed flatus or bowel movement in the immediate postoperative phase. Genito-Urinary:    Denies any urgency, frequency, hematuria. Voiding without difficulty. Musculoskeletal:   Denies joint pain, joint stiffness, joint swelling or muscle pain    Neurology:    Mildly drowsy under the recent administration of pain medication and anesthesia, otherwise denies any headache or focal neurological deficits. No weakness or paresthesia. Derm:    Denies any rashes, ulcers, or excoriations. Denies bruising. Extremities:   Denies any lower extremity swelling or edema.       PHYSICAL EXAM:  VITALS:  Vitals:    12/14/21 1039   BP: (!) 177/103   Pulse: 79   Resp: 18   Temp: 97.5 °F (36.4 °C)   SpO2: 99%         CONSTITUTIONAL:    Mildly drowsy under the recent administration of pain medication and anesthesia, otherwise cooperative, no apparent distress, and appears stated age    EYES:    PERRL, EOMI, sclera clear without icterus, conjunctiva normal    ENT: Normocephalic, atraumatic, sinuses nontender on palpation. External ears without lesions. Oral pharynx with slightly dry mucosa. NECK:    Supple, symmetrical, trachea midline, no adenopathy, thyroid symmetric, not enlarged and no tenderness, skin normal, no bruits, no JVD    HEMATOLOGIC/LYMPHATICS:    No cervical lymphadenopathy and no supraclavicular lymphadenopathy    LUNGS:    Symmetric. No increased work of breathing, mildly diminished air exchange, clear to auscultation bilaterally, no wheezes, rhonchi, or rales,     CARDIOVASCULAR:    Normal apical impulse, regular rate and rhythm, normal S1 and S2, no murmur noted    ABDOMEN:    Unremarkable postoperative appearance with expected degree of tenderness to light palpation. Nondistended abdomen with mildly hypoactive bowel sounds in all 4 quadrants     MUSCULOSKELETAL:    There is no redness, warmth, or swelling of the joints. Tone is normal.    NEUROLOGIC:    Mildly drowsy under the recent administration of pain medication and anesthesia, otherwise oriented to name, place and time. Cranial nerves II-XII are grossly intact. SKIN:    No bruising or bleeding. No redness, warmth, or swelling    EXTREMITIES:    Peripheral pulses present. No edema, cyanosis, or swelling.     LABORATORY DATA:  CBC with Differential:    Lab Results   Component Value Date    WBC 5.8 12/08/2021    RBC 5.32 12/08/2021    HGB 15.7 12/08/2021    HCT 48.2 12/08/2021     12/08/2021    MCV 90.6 12/08/2021    MCH 29.5 12/08/2021    MCHC 32.6 12/08/2021    RDW 12.5 12/08/2021     BMP:    Lab Results   Component Value Date     12/08/2021    K 4.3 12/08/2021     12/08/2021    CO2 26 12/08/2021    BUN 12 12/08/2021    LABALBU 4.3 12/08/2021    LABALBU 4.4 07/06/2011    CREATININE 0.9 12/08/2021    CALCIUM 9.7 12/08/2021    GFRAA >60 12/08/2021    LABGLOM >60 12/08/2021    GLUCOSE 108 12/08/2021    GLUCOSE 93 07/06/2011       ASSESSMENT:  · Morbid obesity with BMI 53.28 kg/m² status post Devika-en-Y gastric bypass    PLAN:  Parish Ruiz underwent gastric bypass due to his underlying morbid obesity. Symptomatic and supportive care, dietary advancement and activity as per the bariatric surgery team.  Routine labs will be assessed including daily labs, hemoglobin A1c, lipid panel and thyroid studies and will be addressed accordingly. Underlying co-morbidites will be addressed during hospitalization as well. Labs and vital signs will be monitored closely and addressed accordingly. See additional orders for details.      CANELO Alvarez CNP  11:35 AM  12/14/2021    Electronically signed by CANELO Alvarez CNP on 12/14/21 at 11:35 AM EST

## 2021-12-14 NOTE — H&P
Claudean Butcher  12/14/2021  [id number]  [address] [facility]  [facility]               History and Physical  Gastric Bypass     CHIEF COMPLAINT: Morbid obesity,  malnutrition, Hypertension, GERD and Degenerative Joint Disease (DJD)    HISTORY OF PRESENT ILLNESS: Claudean Butcher is a morbidly-obese 39 y.o.  male, who weighs (!) 415 lb (188.2 kg). He is 218 pounds over his ideal body weight. The Body mass index is 53.28 kg/m². He has lost 20 pounds over the past several months in preparation for surgery. He has multiple medical problems aggravated by his obesity. He wishes to have a gastric bypass so that he can lose more weight and keep the weight off. I have met with him on 2 different occasions in the [facility], where we discussed the surgery in great detail and went over the risks and benefits. He has watched our informational video so he understands all of the extensive risks involved. He states that he understands all of these risks and wishes to proceed. Allergies   Allergen Reactions    Adhesive Tape     Lactose Intolerance (Gi) Diarrhea     Pt avoids excessive milk or ice cream        No current facility-administered medications on file prior to encounter.      Current Outpatient Medications on File Prior to Encounter   Medication Sig Dispense Refill    Multiple Vitamin (MULTI VITAMIN DAILY PO) Take by mouth daily      Vitamin D (CHOLECALCIFEROL) 25 MCG (1000 UT) TABS tablet Take 1,000 Units by mouth daily         Past Medical History:   Diagnosis Date    Irritable bowel syndrome     Morbid (severe) obesity due to excess calories ([address]     Obesity     Prehypertension     Tobacco use     quit 10/2020       Past Surgical History:   Procedure Laterality Date    COLONOSCOPY  2009    normal    UPPER GASTROINTESTINAL ENDOSCOPY N/A 8/24/2021    EGD BIOPSY performed by Poncho Townsend MD at [place] [address] Facility-Administered Medications Medication Dose Route Frequency Provider Last Rate Last Admin    0.9 % sodium chloride infusion   IntraVENous Continuous Amanda Vargas  mL/hr at 12/14/21 0720 New Bag at 12/14/21 0720    0.9 % sodium chloride infusion  25 mL IntraVENous PRN Amanda Vargas MD        ceFAZolin (ANCEF) 3,000 mg in dextrose 5 % 100 mL IVPB  3,000 mg IntraVENous On Call to Emil Chavis MD        scopolamine (TRANSDERM-SCOP) transdermal patch 1 patch  1 patch TransDERmal Q72H Amanda Vargas MD   1 patch at 12/14/21 0730    HYDROmorphone (DILAUDID) injection 0.25 mg  0.25 mg IntraVENous Q5 Min PRN Gail Wick MD        HYDROmorphone (DILAUDID) injection 0.5 mg  0.5 mg IntraVENous Q5 Min PRN Gail Wick MD        HYDROmorphone (DILAUDID) injection 0.25 mg  0.25 mg IntraVENous Q5 Min PRN Gail Wcik MD        HYDROmorphone (DILAUDID) injection 0.5 mg  0.5 mg IntraVENous Q5 Min PRN Gail Wick MD        oxyCODONE-acetaminophen (PERCOCET) 5-325 MG per tablet 1 tablet  1 tablet Oral Q4H PRN Gail Wick MD        diphenhydrAMINE (BENADRYL) injection 12.5 mg  12.5 mg IntraVENous Once PRN Gail Wick MD        prochlorperazine (COMPAZINE) injection 5 mg  5 mg IntraVENous Once PRN Gail Wick MD        promethazine [facility]) injection 6.25 mg  6.25 mg IntraMUSCular Once PRN Gail Wick MD        hydrALAZINE (APRESOLINE) injection 5 mg  5 mg IntraVENous Q10 Min PRN Gail Wick MD        labetalol (NORMODYNE;TRANDATE) injection 5 mg  5 mg IntraVENous Q10 Min PRN Gail Wick MD        meperidine (DEMEROL) injection 12.5 mg  12.5 mg IntraVENous Q5 Min PRN Gail Wick MD        0.9 % sodium chloride infusion  25 mL IntraVENous PRN Gail Wick MD        sodium chloride flush 0.9 % injection 5-40 mL  5-40 mL IntraVENous 2 times per day Gail Wick MD        sodium chloride flush 0.9 % injection 5-40 mL  5-40 mL IntraVENous PRN Gail Wick MD Allergies   Allergen Reactions    Adhesive Tape     Lactose Intolerance (Gi) Diarrhea     Pt avoids excessive milk or ice cream        Family History   Problem Relation Age of Onset    Diabetes Mother     High Blood Pressure Mother     Diabetes Father     Other Father         aneurysm    Other Sister         hx gastric bypass    Diabetes Sister        Social History     Socioeconomic History    Marital status:      Spouse name: Not on file    Number of children: Not on file    Years of education: Not on file    Highest education level: Not on file   Occupational History    Not on file   Tobacco Use    Smoking status: Former Smoker     Packs/day: 0.50     Years: 12.00     Pack years: 6.00     Types: Cigarettes     Quit date: 2020     Years since quittin.2    Smokeless tobacco: Never Used   Vaping Use    Vaping Use: Former    Substances: Nicotine   Substance and Sexual Activity    Alcohol use: Not Currently    Drug use: Never    Sexual activity: Yes     Partners: Female   Other Topics Concern    Not on file   Social History Narrative    Drinks 2-3 cups of coffee daily. Social Determinants of Health     Financial Resource Strain:     Difficulty of Paying Living Expenses: Not on file   Food Insecurity:     Worried About Running Out of Food in the Last Year: Not on file    Moy of Food in the Last Year: Not on file   Transportation Needs:     Lack of Transportation (Medical): Not on file    Lack of Transportation (Non-Medical):  Not on file   Physical Activity:     Days of Exercise per Week: Not on file    Minutes of Exercise per Session: Not on file   Stress:     Feeling of Stress : Not on file   Social Connections:     Frequency of Communication with Friends and Family: Not on file    Frequency of Social Gatherings with Friends and Family: Not on file    Attends Gnosticism Services: Not on file    Active Member of Clubs or Organizations: Not on file   [employer] Attends Club or Organization Meetings: Not on file    Marital Status: Not on file   Intimate Partner Violence:     Fear of Current or Ex-Partner: Not on file    Emotionally Abused: Not on file    Physically Abused: Not on file    Sexually Abused: Not on file   Housing Stability:     Unable to Pay for Housing in the Last Year: Not on file    Number of [place] in the Last Year: Not on file    Unstable Housing in the Last Year: Not on file       Review of Systems  General ROS: negative for - chills, fatigue or malaise  ENT ROS: negative for - hearing change, nasal congestion or nasal discharge  Allergy and Immunology ROS: negative for - hives, itchy/watery eyes or nasal congestion  Hematological and Lymphatic ROS: negative for - blood clots, blood transfusions, bruising or fatigue  Endocrine ROS: negative for - malaise/lethargy, mood swings, palpitations or polydipsia/polyuria  Respiratory ROS: negative for - sputum changes, stridor, tachypnea or wheezing  Cardiovascular ROS: negative for - irregular heartbeat, loss of consciousness, murmur or orthopnea  Gastrointestinal ROS: negative for - constipation, diarrhea, gas/bloating, heartburn or hematemesis  Genito-Urinary ROS: negative for -  genital discharge, genital ulcers or hematuria  Musculoskeletal ROS: negative for - gait disturbance, muscle pain or muscular weakness  Psychiatric ROS: negative for - visual or auditory hallucinations, suicidal ideation      Physical Exam:   Vitals: BP (!) 159/89   Pulse 69   Temp 97.5 °F (36.4 °C) (Infrared)   Resp 18   Ht 6' 2\" (1.88 m)   Wt (!) 415 lb (188.2 kg)   SpO2 95%   BMI 53.28 kg/m²     General appearance: AAO, NAD  Eyes: PERRL, EOMI, red conjunctiva  Lungs: CTAB, no wheeze, no rhonchi  Chest wall: atraumatic, no tenderness, no echymosis or abrasions  Heart: reg rate, no murmur  Abdomen: soft, nondistended, nontender  Extremities: full ROM all 4 ext, no gross motor or sensory deficits  Pulses: 2+ distal  Skin: warm and dry  Neurologic: spontanous eye opening, purposeful, follows complex commands  Psych: No hallucinations      Assessment:  Morbid obesity with failure of conservative therapy. Patient has been cleared psychologically and medically. The gall bladder ultrasound was normal. Upper endoscopy showed no hiatal hernia. The patient was informed that risks include, but are not limited to: death, anastomotic leak, obstruction, bleeding, and sepsis. Any of these could require further surgery. Other risks include heart attack, DVT, PE, pneumonia, hernia, wound infection, the need for dilatations of the gastrojejunostomy, and the inability to lose appropriate weight and keep it off. We may not be able to do a Gastic Bypass, in that case we will do a Sleeve Gastrectomy. We discussed that our goal is to ameliorate the medical problems and not to obtain a specific body mass index. He understands the risks and benefits and wishes to proceed with the procedure. He has signed a consent form. Plan:  ([id number]) Laparoscopic Devika-en-Y Gastric Bypass possible hiatal hernia repair. He does not need Lovenox post-op.     Physician Signature: Electronically signed by Dr. Lorrie Rincon MD    Send copy of H&P to PCP, Erlinda Lesch, DO

## 2021-12-14 NOTE — ANESTHESIA PRE PROCEDURE
Department of Anesthesiology  Preprocedure Note       Name:  Lisa Hernandez   Age:  39 y.o.  :  1980                                          MRN:  [id number]         Date:  2021      Surgeon: Shannon Garcia):  Chayito Esquivel MD    Procedure: Procedure(s):  GASTRIC BYPASS BRITTANY-EN-Y LAPAROSCOPIC ROBOTIC XI    Medications prior to admission:   Prior to Admission medications    Medication Sig Start Date End Date Taking? Authorizing Provider   omeprazole (PRILOSEC) 20 MG delayed release capsule Take 1 capsule by mouth daily 12/3/21   Chayito Esquivel MD   ondansetron (ZOFRAN-ODT) 4 MG disintegrating tablet Place 1 tablet under the tongue every 8 hours as needed for Nausea or Vomiting 12/3/21   Chayito Esquivel MD   Multiple Vitamin (MULTI VITAMIN DAILY PO) Take by mouth daily    Historical Provider, MD   Vitamin D (CHOLECALCIFEROL) 25 MCG (1000 UT) TABS tablet Take 1,000 Units by mouth daily    Historical Provider, MD       Current medications:    Current Outpatient Medications   Medication Sig Dispense Refill    omeprazole (PRILOSEC) 20 MG delayed release capsule Take 1 capsule by mouth daily 30 capsule 12    ondansetron (ZOFRAN-ODT) 4 MG disintegrating tablet Place 1 tablet under the tongue every 8 hours as needed for Nausea or Vomiting 21 tablet 0    Multiple Vitamin (MULTI VITAMIN DAILY PO) Take by mouth daily      Vitamin D (CHOLECALCIFEROL) 25 MCG (1000 UT) TABS tablet Take 1,000 Units by mouth daily       No current facility-administered medications for this visit. Allergies:     Allergies   Allergen Reactions    Adhesive Tape     Lactose Intolerance (Gi) Diarrhea     Pt avoids excessive milk or ice cream        Problem List:    Patient Active Problem List   Diagnosis Code    Irritable bowel syndrome K58.9    Obesity E66.9    Tobacco use Z72.0    Prehypertension R03.0    Low serum HDL R74.8       Past Medical History:        Diagnosis Date    Irritable bowel syndrome     Morbid (severe) obesity due to excess calories ([facility] [address]     Obesity     Prehypertension     Tobacco use     quit 10/2020       Past Surgical History:        Procedure Laterality Date    COLONOSCOPY  2009    normal    UPPER GASTROINTESTINAL ENDOSCOPY N/A 2021    EGD BIOPSY performed by Merlin Burkitt, MD at [address] [place] [address] Crossing History:    Social History     Tobacco Use    Smoking status: Former Smoker     Packs/day: 0.50     Years: 12.00     Pack years: 6.00     Types: Cigarettes     Quit date: 2020     Years since quittin.2    Smokeless tobacco: Never Used   Substance Use Topics    Alcohol use: Not Currently                                Counseling given: Not Answered      Vital Signs (Current): There were no vitals filed for this visit. BP Readings from Last 3 Encounters:   21 (!) 154/89   21 132/70   21 132/78       NPO Status:                                                                                 BMI:   Wt Readings from Last 3 Encounters:   21 (!) 415 lb (188.2 kg)   21 (!) 414 lb (187.8 kg)   10/26/21 (!) 421 lb (191 kg)     There is no height or weight on file to calculate BMI.    CBC:   Lab Results   Component Value Date    WBC 5.8 2021    RBC 5.32 2021    HGB 15.7 2021    HCT 48.2 2021    MCV 90.6 2021    RDW 12.5 2021     2021       CMP:   Lab Results   Component Value Date     2021    K 4.3 2021     2021    CO2 26 2021    BUN 12 2021    CREATININE 0.9 2021    GFRAA >60 2021    LABGLOM >60 2021    GLUCOSE 108 2021    GLUCOSE 93 2011    PROT 7.2 2021    CALCIUM 9.7 2021    BILITOT 0.7 2021    ALKPHOS 74 2021    AST 23 2021    ALT 37 2021       POC Tests: No results for input(s): POCGLU, POCNA, POCK, POCCL, POCBUN, POCHEMO, POCHCT in the last 72 hours.     Coags: No results found for: PROTIME, INR, APTT    HCG (If Applicable): No results found for: PREGTESTUR, PREGSERUM, HCG, HCGQUANT     ABGs: No results found for: PHART, PO2ART, GYL9ZSR, DJH7KCN, BEART, H3AFFGZW     Type & Screen (If Applicable):  No results found for: LABABO, LABRH    Drug/Infectious Status (If Applicable):  No results found for: HIV, HEPCAB    COVID-19 Screening (If Applicable): No results found for: COVID19        Anesthesia Evaluation  Patient summary reviewed no history of anesthetic complications:   Airway: Mallampati: III  TM distance: >3 FB   Neck ROM: full  Mouth opening: > = 3 FB Dental: normal exam     Comment: Dentition intact, denies any loose teeth. Pulmonary: breath sounds clear to auscultation  (+) decreased breath sounds,      (-) not a current smoker                          ROS comment: Former Smoker  1.0 PPD x 20 years. Quit Smokin20       Cardiovascular:Negative CV ROS    (+) hypertension:, hyperlipidemia        Rhythm: regular  Rate: normal                 ROS comment: EKG: Sinus  Rhythm   Left atrial enlargement  Left anterior fascicular block  Incomplete right bundle branch block     Neuro/Psych:   Negative Neuro/Psych ROS  (+) psychiatric history:             ROS comment: H/O Motion sickness GI/Hepatic/Renal:   (+) GERD:, morbid obesity ( Super morbid obesity)         ROS comment: H/O Irritable bowel syndrome, . Endo/Other: Negative Endo/Other ROS                    Abdominal:   (+) obese,           Vascular: negative vascular ROS. Other Findings:             Anesthesia Plan      general     ASA 3       Induction: intravenous. MIPS: Postoperative opioids intended and Prophylactic antiemetics administered. Anesthetic plan and risks discussed with patient. Plan discussed with CRNA.                   Esperanza Epperson MD   2021

## 2021-12-14 NOTE — ANESTHESIA POSTPROCEDURE EVALUATION
Department of Anesthesiology  Postprocedure Note    Patient: Randi Marie  MRN: [id number]  YOB: 1980  Date of evaluation: 12/14/2021  Time:  12:46 PM     Procedure Summary     Date: 12/14/21 Room / Location: [address] [place] [address] 06 / [address] [facility]    Anesthesia Start: 9042 Anesthesia Stop: 0546    Procedure: GASTRIC BYPASS BRITTANY-EN-Y LAPAROSCOPIC ROBOTIC XI (N/A Abdomen) Diagnosis: (MORBID OBESITY)    Surgeons: Amanda Vargas MD Responsible Provider: Gail Wick MD    Anesthesia Type: general ASA Status: 3          Anesthesia Type: general    Orestes Phase I: Orestes Score: 8    Orestes Phase II:      Last vitals: Reviewed and per EMR flowsheets.        Anesthesia Post Evaluation    Patient location during evaluation: PACU  Patient participation: complete - patient participated  Level of consciousness: awake  Pain score: 3  Airway patency: patent  Nausea & Vomiting: no nausea and no vomiting  Complications: no  Cardiovascular status: hemodynamically stable  Respiratory status: acceptable  Hydration status: euvolemic

## 2021-12-15 VITALS
SYSTOLIC BLOOD PRESSURE: 147 MMHG | DIASTOLIC BLOOD PRESSURE: 71 MMHG | HEART RATE: 72 BPM | TEMPERATURE: 97.8 F | OXYGEN SATURATION: 96 % | BODY MASS INDEX: 40.43 KG/M2 | RESPIRATION RATE: 16 BRPM | WEIGHT: 315 LBS | HEIGHT: 74 IN

## 2021-12-15 LAB
ANION GAP SERPL CALCULATED.3IONS-SCNC: 13 MMOL/L (ref 7–16)
BASOPHILS ABSOLUTE: 0.02 E9/L (ref 0–0.2)
BASOPHILS RELATIVE PERCENT: 0.2 % (ref 0–2)
BUN BLDV-MCNC: 12 MG/DL (ref 6–20)
CALCIUM SERPL-MCNC: 8.5 MG/DL (ref 8.6–10.2)
CHLORIDE BLD-SCNC: 101 MMOL/L (ref 98–107)
CHOLESTEROL, TOTAL: 124 MG/DL (ref 0–199)
CO2: 21 MMOL/L (ref 22–29)
CREAT SERPL-MCNC: 0.7 MG/DL (ref 0.7–1.2)
EOSINOPHILS ABSOLUTE: 0.02 E9/L (ref 0.05–0.5)
EOSINOPHILS RELATIVE PERCENT: 0.2 % (ref 0–6)
GFR AFRICAN AMERICAN: >60
GFR NON-AFRICAN AMERICAN: >60 ML/MIN/1.73
GLUCOSE BLD-MCNC: 99 MG/DL (ref 74–99)
HBA1C MFR BLD: 5.3 % (ref 4–5.6)
HCT VFR BLD CALC: 42.8 % (ref 37–54)
HDLC SERPL-MCNC: 29 MG/DL
HEMOGLOBIN: 14 G/DL (ref 12.5–16.5)
IMMATURE GRANULOCYTES #: 0.02 E9/L
IMMATURE GRANULOCYTES %: 0.2 % (ref 0–5)
LDL CHOLESTEROL CALCULATED: 80 MG/DL (ref 0–99)
LYMPHOCYTES ABSOLUTE: 1.3 E9/L (ref 1.5–4)
LYMPHOCYTES RELATIVE PERCENT: 13.8 % (ref 20–42)
MAGNESIUM: 2.1 MG/DL (ref 1.6–2.6)
MCH RBC QN AUTO: 30.4 PG (ref 26–35)
MCHC RBC AUTO-ENTMCNC: 32.7 % (ref 32–34.5)
MCV RBC AUTO: 93 FL (ref 80–99.9)
MONOCYTES ABSOLUTE: 0.84 E9/L (ref 0.1–0.95)
MONOCYTES RELATIVE PERCENT: 8.9 % (ref 2–12)
NEUTROPHILS ABSOLUTE: 7.22 E9/L (ref 1.8–7.3)
NEUTROPHILS RELATIVE PERCENT: 76.7 % (ref 43–80)
PDW BLD-RTO: 12.9 FL (ref 11.5–15)
PLATELET # BLD: 218 E9/L (ref 130–450)
PMV BLD AUTO: 10.5 FL (ref 7–12)
POTASSIUM SERPL-SCNC: 3.6 MMOL/L (ref 3.5–5)
RBC # BLD: 4.6 E12/L (ref 3.8–5.8)
SODIUM BLD-SCNC: 135 MMOL/L (ref 132–146)
T4 FREE: 1.68 NG/DL (ref 0.93–1.7)
TRIGL SERPL-MCNC: 74 MG/DL (ref 0–149)
TSH SERPL DL<=0.05 MIU/L-ACNC: 0.31 UIU/ML (ref 0.27–4.2)
VLDLC SERPL CALC-MCNC: 15 MG/DL
WBC # BLD: 9.4 E9/L (ref 4.5–11.5)

## 2021-12-15 PROCEDURE — 6360000002 HC RX W HCPCS: Performed by: SURGERY

## 2021-12-15 PROCEDURE — 84439 ASSAY OF FREE THYROXINE: CPT

## 2021-12-15 PROCEDURE — 80048 BASIC METABOLIC PNL TOTAL CA: CPT

## 2021-12-15 PROCEDURE — 83036 HEMOGLOBIN GLYCOSYLATED A1C: CPT

## 2021-12-15 PROCEDURE — 80061 LIPID PANEL: CPT

## 2021-12-15 PROCEDURE — 85025 COMPLETE CBC W/AUTO DIFF WBC: CPT

## 2021-12-15 PROCEDURE — 6370000000 HC RX 637 (ALT 250 FOR IP): Performed by: SURGERY

## 2021-12-15 PROCEDURE — 36415 COLL VENOUS BLD VENIPUNCTURE: CPT

## 2021-12-15 PROCEDURE — 83735 ASSAY OF MAGNESIUM: CPT

## 2021-12-15 PROCEDURE — 84443 ASSAY THYROID STIM HORMONE: CPT

## 2021-12-15 RX ADMIN — KETOROLAC TROMETHAMINE 30 MG: 30 INJECTION, SOLUTION INTRAMUSCULAR; INTRAVENOUS at 02:57

## 2021-12-15 RX ADMIN — KETOROLAC TROMETHAMINE 30 MG: 30 INJECTION, SOLUTION INTRAMUSCULAR; INTRAVENOUS at 09:13

## 2021-12-15 RX ADMIN — MORPHINE SULFATE 2 MG: 2 INJECTION, SOLUTION INTRAMUSCULAR; INTRAVENOUS at 02:57

## 2021-12-15 RX ADMIN — ACETAMINOPHEN 650 MG: 160 SUSPENSION ORAL at 06:10

## 2021-12-15 RX ADMIN — FAMOTIDINE 20 MG: 20 TABLET, FILM COATED ORAL at 09:13

## 2021-12-15 ASSESSMENT — PAIN SCALES - GENERAL
PAINLEVEL_OUTOF10: 5
PAINLEVEL_OUTOF10: 4
PAINLEVEL_OUTOF10: 5
PAINLEVEL_OUTOF10: 6

## 2021-12-15 NOTE — DISCHARGE SUMMARY
Physician Discharge Summary     Patient ID:  Titi Vergraa  [id number]  75 y.o.  1980    Admit date: 12/14/2021    Discharge date and time: 12/15/2021    Admitting Physician: Keshia Garcia MD     Admission Diagnoses:   Patient Active Problem List   Diagnosis    Irritable bowel syndrome    Obesity    Tobacco use    Prehypertension    Low serum HDL    S/P gastric bypass    Morbid obesity with BMI of 50.0-59.9, adult ([facility]       Discharge Diagnoses:   Patient Active Problem List   Diagnosis    Irritable bowel syndrome    Obesity    Tobacco use    Prehypertension    Low serum HDL    S/P gastric bypass    Morbid obesity with BMI of 50.0-59.9, adult ([facility]       Admission Condition: good    Discharged Condition: good    Indication for Admission: Morbid obesity    Hospital Course: Titi Vergara is a 39 y.o. male who was admitted with morbid obesity and hypertension, including multiple other comorbidities. Patient underwent laparoscopic Devika-en-Y gastric bypass without complication. They did well postoperatively, diet was advanced, they were ambulating independently and pain was controlled. They were discharged with appropriate medication, instructions and follow up.      Consults: Hospitalist    Significant Diagnostic Studies: labs    Treatments: IV hydration, antibiotics: Ancef, analgesia: IV narcotic, tylenol, toradol and oral narcotics and surgery: Laparoscopic Devika-en-Y GB    In process/preliminary results:  Outstanding Order Results     Date and Time Order Name Status Description    12/15/2021  4:30 AM Hemoglobin A1C In process     12/14/2021 12:00 AM Surgical Pathology In process           Patient Instructions:   Current Discharge Medication List      CONTINUE these medications which have NOT CHANGED    Details   omeprazole (PRILOSEC) 20 MG delayed release capsule Take 1 capsule by mouth daily  Qty: 30 capsule, Refills: 12    Associated Diagnoses: Gastroesophageal reflux disease without esophagitis ondansetron (ZOFRAN-ODT) 4 MG disintegrating tablet Place 1 tablet under the tongue every 8 hours as needed for Nausea or Vomiting  Qty: 21 tablet, Refills: 0    Associated Diagnoses: PONV (postoperative nausea and vomiting)      Multiple Vitamin (MULTI VITAMIN DAILY PO) Take by mouth daily      Vitamin D (CHOLECALCIFEROL) 25 MCG (1000 UT) TABS tablet Take 1,000 Units by mouth daily             Discharge Exam:  General appearance: AAOx3, NAD  Head: NCAT, PERRLA, EOMI, red conjunctiva  Neck: supple, no masses  Lungs: Equal chest rise bilateral  Heart: Reg rate  Abdomen: soft, nondistended, tender appropriately, normotympanic, no guarding, no peritoneal signs, incision C/D/I  Extremities: extremities normal, atraumatic, no cyanosis or edema    Disposition: home    Patient Instructions: Activity: no lifting, Driving, or Strenuous exercise for 2 weeks  Diet: Bariatric Clears  Wound Care: keep wound clean and dry    Follow-up with Dr Va Nye in weight loss center in 2 weeks.       Signed:  Adi Lowery MD  12/15/2021  9:30 AM

## 2021-12-15 NOTE — PROGRESS NOTES
Internal Medicine Progress Note    PRETTY=[facility]    Leatha Quigley. Rhina Martin, MELISA Guzmán D.O., MELISA Gonzales D.O. Leroy Engel, MSN, APRN, NP-C  Leah Shine. Elsy Ovalle, MSN, APRN-CNP     Primary Care Physician: Leonora Dave DO   Admitting Physician:  Amita Camarillo MD  Admission date and time: 12/14/2021  6:29 AM    Room:  [address] [place] [address]  Admitting diagnosis: S/P gastric bypass [Z98.84]    Patient Name: Anjana Vance  MRN: [id number]    Date of Service: 12/15/2021     Subjective:  Garrison Van is a 39 y.o. male who was seen and examined today,12/15/2021, at the bedside. James tolerated bariatric surgery yesterday without complication. He is seated at the bedside chair during my examination. He admits to mild postoperative abdominal pain as to be expected. He is tolerating his current diet. Review of System:   Constitutional:   Denies fever or chills, weight loss or gain, fatigue or malaise. HEENT:   Denies ear pain, sore throat, sinus or eye problems. Cardiovascular:   Denies any chest pain, irregular heartbeats, or palpitations. Respiratory:   Denies shortness of breath, coughing, sputum production, hemoptysis, or wheezing. Gastrointestinal:   Mild postoperative abdominal pain as to be expected. Genitourinary:    Denies any urgency, frequency, hematuria. Voiding  without difficulty. Extremities:   Denies lower extremity swelling, edema or cyanosis. Neurology:    Denies any headache or focal neurological deficits, Denies generalized weakness or memory difficulty. Psch:   Denies being anxious or depressed. Musculoskeletal:    Denies  myalgias, joint complaints or back pain. Integumentary:   Denies any rashes, ulcers, or excoriations. Denies bruising. Hematologic/Lymphatic:  Denies bruising or bleeding.     Physical Exam:  I/O this shift:  In: 680 [P.O.:680]  Out: 850 [Urine:850]    Intake/Output Summary (Last 24 hours) at 12/15/2021 0646  Last data filed at 12/15/2021 3972  Gross per 24 hour   Intake 3225.83 ml   Output 1010 ml   Net 2215.83 ml   I/O last 3 completed shifts: In: 2545.8 [I.V.:2445.8; IV Piggyback:100]  Out: 160 [Urine:150; Blood:10]  Patient Vitals for the past 96 hrs (Last 3 readings):   Weight   12/14/21 0717 (!) 415 lb (188.2 kg)     Vital Signs:   Blood pressure 132/86, pulse 106, temperature 97.6 °F (36.4 °C), temperature source Oral, resp. rate 16, height 6' 2\" (1.88 m), weight (!) 415 lb (188.2 kg), SpO2 96 %. General appearance:  Alert, responsive, oriented to person, place, and time. Well preserved, alert, no distress. Head:  Normocephalic. No masses, lesions or tenderness. Eyes:  PERRLA. EOMI. Sclera clear. Buccal mucosa moist.  ENT:  Ears normal. Mucosa normal.  Neck:    Supple. Trachea midline. No thyromegaly. No JVD. No bruits. Heart:    Rhythm regular. Rate controlled. No murmurs. Lungs:    Symmetrical. Clear to auscultation bilaterally. No wheezes. No rhonchi. No rales. Abdomen:   Tender to palpation diffusely with voluntary guarding elicited. Bowel sounds are hypoactive. Extremities:    Peripheral pulses present. No peripheral edema. No ulcers. No cyanosis. No clubbing. Neurologic:    Alert x 3. No focal deficit. Cranial nerves grossly intact. No focal weakness. Psych:   Behavior is normal. Mood appears normal. Speech is not rapid and/or pressured. Musculoskeletal:   Spine ROM normal. Muscular strength intact. Gait not assessed. Integumentary:  No rashes  Skin normal color and texture.   Genitalia/Breast:  Deferred    Medication:  Scheduled Meds:   sodium chloride flush  5-40 mL IntraVENous 2 times per day    acetaminophen  650 mg Oral Q6H    scopolamine  1 patch TransDERmal Q72H    ketorolac  30 mg IntraVENous Q6H    enoxaparin  40 mg SubCUTAneous 2 times per day    famotidine  20 mg Oral BID    Or    famotidine (PEPCID) injection  20 mg IntraVENous BID     Continuous Infusions:   lactated ringers 125 mL/hr at 12/14/21 1232    sodium chloride         Objective Data:  CBC with Differential:    Lab Results   Component Value Date    WBC 9.4 12/15/2021    RBC 4.60 12/15/2021    HGB 14.0 12/15/2021    HCT 42.8 12/15/2021     12/15/2021    MCV 93.0 12/15/2021    MCH 30.4 12/15/2021    MCHC 32.7 12/15/2021    RDW 12.9 12/15/2021    LYMPHOPCT 13.8 12/15/2021    MONOPCT 8.9 12/15/2021    BASOPCT 0.2 12/15/2021    MONOSABS 0.84 12/15/2021    LYMPHSABS 1.30 12/15/2021    EOSABS 0.02 12/15/2021    BASOSABS 0.02 12/15/2021     BMP:    Lab Results   Component Value Date     12/15/2021    K 3.6 12/15/2021    K 4.3 12/08/2021     12/15/2021    CO2 21 12/15/2021    BUN 12 12/15/2021    LABALBU 4.3 12/08/2021    LABALBU 4.4 07/06/2011    CREATININE 0.7 12/15/2021    CALCIUM 8.5 12/15/2021    GFRAA >60 12/15/2021    LABGLOM >60 12/15/2021    GLUCOSE 99 12/15/2021    GLUCOSE 93 07/06/2011       Assessment:  1. Status post gastric bypass surgery in the setting of morbid obesity    Plan:   Salena Zarco tolerated bariatric surgery yesterday without complication. He has been ambulatory and tolerating his current liquid diet. His pain is well controlled. He will be evaluated by the dietary team once again today and anticipate discharge home later this afternoon. Continue current therapy. See orders for further plan of care. More than 50% of my time was spent at the bedside counseling/coordinating care with the patient and/or family with face to face contact. This time was spent reviewing notes and laboratory data as well as instructing and counseling the patient. Time I spent with the family or surrogate(s) is included only if the patient was incapable of providing the necessary information or participating in medical decisions. I also discussed the differential diagnosis and all of the proposed management plans with the patient and individuals accompanying the patient.  I am readily available for any further decision-making and intervention.        Jeremias Cooper DO, F.A.C.O.I.  12/15/2021  6:46 AM

## 2021-12-15 NOTE — PROGRESS NOTES
GENERAL SURGERY  DAILY PROGRESS NOTE  12/15/2021    Subjective:  Doing very well this morning, no nausea or vomiting, tolerating liquids, no on oxygen, low grade tachycardia this morning but no abdominal pain    Objective:  /86   Pulse 106   Temp 97.6 °F (36.4 °C) (Oral)   Resp 16   Ht 6' 2\" (1.88 m)   Wt (!) 415 lb (188.2 kg)   SpO2 96%   BMI 53.28 kg/m²     GENERAL:  Laying in bed, awake, alert, cooperative, no apparent distress  HEAD: Normocephalic, atraumatic  EYES: No sclera icterus, pupils equal  LUNGS:  No increased work of breathing  CARDIOVASCULAR:  RR  ABDOMEN:  Soft, non-tender, non-distended, incisions C/D/I  EXTREMITIES: edema  SKIN: Warm and dry    Assessment/Plan:  39 y.o. male s/p robo collins en y 12/14    Continue bariatric CLD  Ambulate  PO pain control  Will DC later if doing well     Electronically signed by Abbi Mcqueen DO on 12/15/2021 at 7:03 AM

## 2021-12-16 ENCOUNTER — TELEPHONE (OUTPATIENT)
Dept: BARIATRICS/WEIGHT MGMT | Age: 41
End: 2021-12-16

## 2021-12-17 ENCOUNTER — TELEPHONE (OUTPATIENT)
Dept: BARIATRICS/WEIGHT MGMT | Age: 41
End: 2021-12-17

## 2021-12-17 NOTE — TELEPHONE ENCOUNTER
Water broken down into 4 meals 3 oz in size. Pt verbalized understanding. Instructed per Standing Orders: yes, Pt can start his  Bariatric Full Liquid Diet on 12/17/21. James Lewis reviewed Bar Full Liquid Diet and reinforced diet concepts. Pt has education book and handouts and states he/ she is following the instruction given. Use of protein supplement was reviewed with how to mix his / her protein supplement. Pt verbalized understanding. Pt instructed to call with any dietary questions. POC D/T problem noted above:   1. Colace pt states he just started today. Pt states he was instructed by the hospital staff not to take unless after 4 to 5  days he feels constipated. Pt states he received a lot of conflicting information from the hospital staff. James Lewis reviewed for the first 6 weeks pt needs to be taking is Colace 100 mgs twice daily . Pt verbalized understanding. 2. Pt states he was never instructed to schedule any Post-Op F/U appt with his PCP and does not have an appt. James Lewis instructed pt to call his PCP and schedule a f/u appt for next week. If pt is having difficulty getting his PCP to schedule this appt he can call the [facility] for assistance. Chart is marked 12/21/21 at 10:00 am but no appt in Epic system scheduled and pt states he does not have any appointments scheduled. Pt instructed to f/u with his PCP. Enc pt to try [facility] to locate PCP. 3. Pt is questioning why he was told to take his fiber supplement once he gets home from the hospital. Pt states the staff instructed him on going home and starting his fiber supplement that he took before sx. Pt states he has been taking his fiber supplement since being d/c. James Lewis reviewed for the pt not to  take fiber supplement. While its true that having enough fiber in your diet helps maintain proper bowel function, bulk fiber supplements after surgery pose a danger of actually making constipation worst if you dont drink enough water.  James Lewis reviewed we would instruct the pt on this at his next f/u appt once assessed.  Pt verbalized understanding    Surgeon Notified:no    Patient Response:  Verbalized understanding of the above instruction: yes,   Will keep detailed food records for 2 week f/u appt: yes,   Will return to [facility] for his her 2 week f/u appointment - Reminded pt to have labs drawn

## 2021-12-20 ENCOUNTER — TELEPHONE (OUTPATIENT)
Dept: BARIATRICS/WEIGHT MGMT | Age: 41
End: 2021-12-20

## 2022-01-07 ENCOUNTER — INITIAL CONSULT (OUTPATIENT)
Dept: BARIATRICS/WEIGHT MGMT | Age: 42
End: 2022-01-07

## 2022-01-07 ENCOUNTER — OFFICE VISIT (OUTPATIENT)
Dept: BARIATRICS/WEIGHT MGMT | Age: 42
End: 2022-01-07
Payer: COMMERCIAL

## 2022-01-07 VITALS
DIASTOLIC BLOOD PRESSURE: 94 MMHG | TEMPERATURE: 97.3 F | SYSTOLIC BLOOD PRESSURE: 141 MMHG | RESPIRATION RATE: 20 BRPM | HEIGHT: 74 IN | BODY MASS INDEX: 40.43 KG/M2 | WEIGHT: 315 LBS | HEART RATE: 74 BPM

## 2022-01-07 VITALS — BODY MASS INDEX: 40.43 KG/M2 | HEIGHT: 74 IN | WEIGHT: 315 LBS

## 2022-01-07 DIAGNOSIS — E66.01 MORBID OBESITY DUE TO EXCESS CALORIES (HCC): ICD-10-CM

## 2022-01-07 DIAGNOSIS — Z71.3 DIETARY COUNSELING: Primary | ICD-10-CM

## 2022-01-07 DIAGNOSIS — K91.2 MALNUTRITION FOLLOWING GASTROINTESTINAL SURGERY: Primary | ICD-10-CM

## 2022-01-07 PROCEDURE — 99024 POSTOP FOLLOW-UP VISIT: CPT | Performed by: SURGERY

## 2022-01-07 PROCEDURE — 99999 PR OFFICE/OUTPT VISIT,PROCEDURE ONLY: CPT

## 2022-01-07 PROCEDURE — 99212 OFFICE O/P EST SF 10 MIN: CPT

## 2022-01-07 NOTE — PROGRESS NOTES
Medical Nutrition Therapy (MNT) Assessment     Pt. Name: Benson Villa   Date:1/7/2022  F/U Appt: Sx Type 3 week RYGB     Food Records Kept: Yes  24Hour Recall Completed at Office:No    Ht 6' 2\" (1.88 m)   Wt (!) 389 lb (176.4 kg)   BMI 49.94 kg/m²  Height: 6' 2\" (1.88 m) Weight: (!) 389 lb (176.4 kg)   IBW:ideal body weight   197 lbs % EBWL: 11%     Wt Readings from Last 3 Encounters:   01/07/22 (!) 389 lb (176.4 kg)   01/07/22 (!) 389 lb (176.4 kg)   12/14/21 (!) 415 lb (188.2 kg)                     Protein supplements: Pt. is currently using the following protein supplement nectar and consuming the following grams of protein 75. Rd / Ld reviewed with patient based on 1.0 gram per kg of IBW patient needs  grams of protein total daily. Subjective:                   Current MNT:       Bariatric full liquid diet with MVI, Calcium & Protein Supplements     MNT Advanced to:     Bariatric puree diet with MVI, Calcium & Protein Supplements      Allergies and Food Allergies and Food Intolerances:   Lactose intol    No - Nausea  No - Vomiting    SWLC Bowel Protocol:  Patient states he / she has the following bowel movements per week 5-7  no - Diarrhea  No - Steatorrhea  No - Constipation  When was your last bowel movement yesterday  How much plain water are you drinking daily 66 oz avg  What other beverages / fluids are you drinking daily and the amount crystal light   Yes - Are you taking Colace daily  What amount of Colace are you taking daily 2  No - Are you taking Sugar Free Chewable Fiber Gummies  What amount of Sugar Free Chewable Fiber Gummies are you taking daily none  Yes - Did your surgeon discuss constipation with you? No - Did your surgeon discuss increasing water intake? How much water were you instructed to take daily? Continue current   No - Did your surgeon discuss continuing Colace?   How much Colace did your surgeon instruct you to take? na  No - Did your surgeon discuss stopping Colace? No - Did your surgeon discuss starting Fiber Gummies? How much Fiber Gummies did your surgeon instruct you to take? na  Did your surgeon discuss any other medications / supplements to take daily to move your bowels and avoid constipation?no  No Patient was provided today the Constipation Handout  Yes James Lewis reinforced pt needs to consume the following - Water Intake should be 64 oz, Fiber Chews prn, Dietary Fiber Intake 25 g        No - Hair loss   No - Weight regain       No - Acid Reflux  No - Dumping Syndrome      No - Food gets stuck  No - Are you eating solids - should not be eating solids until 6 weeks post-op. no - Night Time Coughing  no -  B Ping Noted  No - Are you chewing thoroughly  - Does not take effect until 6 weeks post-op  No - Are you hungry after eating     How many meals a day 6 / Portions Sizes of Meals are 3 oz         Labs: not available - labs were drawn at his PCP, pt to have faxed to [facility]     Supplements: not taking yet     Estimated Daily Nutritional Needs: Based on Bariatric procedure for Wt. Loss  Energy: Will be calculated at Maintenance Stage    Protein: 60 - 80 gms Daily    MNT Plan and Additional Education: RD/GABRIEL reviewed Bariatric Puree Diet and how to puree food. Encouraged pt to meet protein and fluid needs daily. Pt. verbalized understanding. Handouts given. Pt. Instructed to call with questions. MEDICAL NUTRITION THERAPY (MNT) - Nutrition Care Plan   (The patient has been educated and given written education material that reinforces the following dietary guidelines for Bariatric Surgery)  Goal:  Patient able to verbalize the following dietary concepts:  3 to 4 ounce portions per meal     6 small meals daily      60 to 80 grams of protein   48 to 64 ounces of fluid daily (water)     Always consume protein item first with all meals   Pt is aware wt loss is pt controlled.    Slow Meals - 30-35 chews per bite / Meals 30 - 45 minutes long            The JAMES / GABRIEL reviewed with the patient that the dietary goals of the bariatric patient is to ensure that patient is able to meet established nutritional needs for a Bariatric Surgery  Patient at this time in order to promote healing, prevent significant weight changes, prevent skin breakdown and abnormal lab values, prevent complications, and tolerance to diet and texture of foods. Pt is able to identify proper food choices and needed changes and is able to explain proper food preparation. RD / LD reviewed compliance with assigned diet stages, volume of food and drink consumed, timing of meals, amount of protein and energy intake, daily vitamin and mineral supplementation, identify food cravings and any adverse reactions associated with intake of food and drink. RD / LD uses behavioral tools such as goal setting, MI, and self-monitoring, to reinforce the anatomic and physiologic effects of the surgery, so that the described behaviors become more of a habit not simply a reaction to the altered anatomy. Care Plan:   · Rd/Ld Addressed Food Records or 24-Hour Recall  · Rd / Ld encouraged patient to exercise at least 30 minutes daily  · Rd / Ld instructed patient on how to increase oral protein intake within the diet. Pt. can verbalize he / she will need to consume 60 - 80 grams. · Rd / Ld instructed the patient on how to increase the use of protein supplements within the diet. · Pt. was instructed on how to increase water intake. Patient will need to consume 48 - 64 oz. of just plain water in addition to 30 oz. of non-caloric beverages. · Handouts given to patient  · RD / LD encouraged oral intake    · RD / LD encouraged pt. to keep food records.       · Pt. is able to verbalize diet concepts      No - Constipation Handout Given and Reviewed    No - High Fiber Handout Given and Reviewed      No - Ulcer Handout Given and Reviewed          No - Pt. was instructed to continue current MNT   Yes - Pt. diet was advanced to the following stage ( See above)   No - Supplements: initiated (See list below  - Pt. given instruction)     No - Supplemental foods:______________________  No - Pt. was placed on a altered meal schedule    Good - Dietary Compliance

## 2022-01-07 NOTE — PROGRESS NOTES
Patient is 3 wk LRYGB. Incisions are healing well. Water intake is around 66 oz daily. Protein through shakes and foods. Bowel movements are good. Scheduled with Lucy Valadez for dietary.

## 2022-01-07 NOTE — PROGRESS NOTES
Venice Ryan Devan  1/7/2022  Laparoscopic Devika-en- Y Gastric Bypass  Two weeks Post-Op Follow-up. Subjective:   Ruby Crews is a 39 y.o. male is two weeks post Laparoscopic Devika-en-Y Gastric Bypass. The patient is having any pain intermittent RUQ. Reports no problems with swallowing liquids, bowel movements, voiding, or the wounds. He is not having swallowing difficulty, is compliant most of the time with the multivitamins and calcium + Vit D. He is meeting fluid recommendations of at least 64 ounces per day and is meeting protein recommendations. Exercise: no regular exercise. (!) 389 lb (176.4 kg) Today's weight represents a weight loss of 26 pounds since surgery. Prior to Admission medications    Medication Sig Start Date End Date Taking?  Authorizing Provider   omeprazole (PRILOSEC) 20 MG delayed release capsule Take 1 capsule by mouth daily 12/3/21  Yes Toya Mcallister MD   ondansetron (ZOFRAN-ODT) 4 MG disintegrating tablet Place 1 tablet under the tongue every 8 hours as needed for Nausea or Vomiting 12/3/21  Yes Toya Mcallister MD   Multiple Vitamin (MULTI VITAMIN DAILY PO) Take by mouth daily   Yes Historical Provider, MD   Vitamin D (CHOLECALCIFEROL) 25 MCG (1000 UT) TABS tablet Take 1,000 Units by mouth daily   Yes Historical Provider, MD        Physical exam:  VITALS: BP (!) 141/94 (Site: Right Lower Arm, Position: Sitting, Cuff Size: Large Adult)   Pulse 74   Temp 97.3 °F (36.3 °C) (Temporal)   Resp 20   Ht 6' 2\" (1.88 m)   Wt (!) 389 lb (176.4 kg)   BMI 49.94 kg/m²    General appearance: alert, appears stated age and cooperative  Head: Normocephalic, without obvious abnormality, atraumatic  Neck: no adenopathy, supple, symmetrical, trachea midline and thyroid not enlarged, symmetric, no tenderness/mass/nodules  Lungs: clear to auscultation bilaterally  Heart: regular rate and rhythm  Abdomen:  Incisions healing well, surgical glue in place, no allergic reaction, no cellulitis  Extremities: extremities normal, atraumatic, no cyanosis or edema    Assessment:    He is three weeks post laparoscopic gastric bypass. Plan:   Doing well. Walk as much as possible but keep your legs elevated when sitting. Continue deep breathing exercizes. Transition to the high protein Pureed-liquid diet. Continue drinking 1 oz every 10-15 minutes to prevent dehydration. Slowly increase this amount as tolerated. Aim for 60 gm Protein and 90 oz of liquids per day. Slow down if you feel chest pressure, acid or fullness. Track protein and fluid intake and bring to your next appointment. Follow up in 4 weeks and call the clinic if questions arise in the meantime. Repeat labs in one month. Make sure the bowel move daily by taking fiber such as Metamucil.       Physician Signature: Electronically signed by Dr. Luiz Hawkins MD

## 2022-02-01 ENCOUNTER — TELEPHONE (OUTPATIENT)
Dept: BARIATRICS/WEIGHT MGMT | Age: 42
End: 2022-02-01

## 2022-02-02 ENCOUNTER — HOSPITAL ENCOUNTER (OUTPATIENT)
Age: 42
Discharge: HOME | End: 2022-02-02
Payer: COMMERCIAL

## 2022-02-02 DIAGNOSIS — K91.2 MALNUTRITION FOLLOWING GASTROINTESTINAL SURGERY: ICD-10-CM

## 2022-02-02 LAB
ALBUMIN SERPL-MCNC: 4.4 G/DL (ref 3.5–5.2)
ALP BLD-CCNC: 88 U/L (ref 40–129)
ALT SERPL-CCNC: 32 U/L (ref 0–40)
ANION GAP SERPL CALCULATED.3IONS-SCNC: 15 MMOL/L (ref 7–16)
AST SERPL-CCNC: 22 U/L (ref 0–39)
BILIRUB SERPL-MCNC: 0.8 MG/DL (ref 0–1.2)
BUN BLDV-MCNC: 10 MG/DL (ref 6–20)
CALCIUM SERPL-MCNC: 9.6 MG/DL (ref 8.6–10.2)
CHLORIDE BLD-SCNC: 102 MMOL/L (ref 98–107)
CHOLESTEROL, TOTAL: 105 MG/DL (ref 0–199)
CO2: 22 MMOL/L (ref 22–29)
CREAT SERPL-MCNC: 0.8 MG/DL (ref 0.7–1.2)
FERRITIN: 181 NG/ML
FOLATE: 16.1 NG/ML (ref 4.8–24.2)
GFR AFRICAN AMERICAN: >60
GFR NON-AFRICAN AMERICAN: >60 ML/MIN/1.73
GLUCOSE BLD-MCNC: 103 MG/DL (ref 74–99)
HCT VFR BLD CALC: 47.6 % (ref 37–54)
HEMOGLOBIN: 15.3 G/DL (ref 12.5–16.5)
MCH RBC QN AUTO: 29.4 PG (ref 26–35)
MCHC RBC AUTO-ENTMCNC: 32.1 % (ref 32–34.5)
MCV RBC AUTO: 91.5 FL (ref 80–99.9)
PDW BLD-RTO: 14 FL (ref 11.5–15)
PLATELET # BLD: 201 E9/L (ref 130–450)
PMV BLD AUTO: 12.2 FL (ref 7–12)
POTASSIUM SERPL-SCNC: 3.9 MMOL/L (ref 3.5–5)
PREALBUMIN: 16 MG/DL (ref 20–40)
RBC # BLD: 5.2 E12/L (ref 3.8–5.8)
SODIUM BLD-SCNC: 139 MMOL/L (ref 132–146)
TOTAL PROTEIN: 7.2 G/DL (ref 6.4–8.3)
TRIGL SERPL-MCNC: 77 MG/DL (ref 0–149)
VITAMIN B-12: 1031 PG/ML (ref 211–946)
WBC # BLD: 5.2 E9/L (ref 4.5–11.5)

## 2022-02-02 PROCEDURE — 80053 COMPREHEN METABOLIC PANEL: CPT

## 2022-02-02 PROCEDURE — 82746 ASSAY OF FOLIC ACID SERUM: CPT

## 2022-02-02 PROCEDURE — 84478 ASSAY OF TRIGLYCERIDES: CPT

## 2022-02-02 PROCEDURE — 84425 ASSAY OF VITAMIN B-1: CPT

## 2022-02-02 PROCEDURE — 84630 ASSAY OF ZINC: CPT

## 2022-02-02 PROCEDURE — 82728 ASSAY OF FERRITIN: CPT

## 2022-02-02 PROCEDURE — 82465 ASSAY BLD/SERUM CHOLESTEROL: CPT

## 2022-02-02 PROCEDURE — 84134 ASSAY OF PREALBUMIN: CPT

## 2022-02-02 PROCEDURE — 85027 COMPLETE CBC AUTOMATED: CPT

## 2022-02-02 PROCEDURE — 82607 VITAMIN B-12: CPT

## 2022-02-02 PROCEDURE — 36415 COLL VENOUS BLD VENIPUNCTURE: CPT

## 2022-02-08 LAB
VITAMIN B1 WHOLE BLOOD: 164 NMOL/L (ref 70–180)
ZINC: 98.6 UG/DL (ref 60–120)

## 2022-02-11 ENCOUNTER — OFFICE VISIT (OUTPATIENT)
Dept: BARIATRICS/WEIGHT MGMT | Age: 42
End: 2022-02-11
Payer: COMMERCIAL

## 2022-02-11 ENCOUNTER — INITIAL CONSULT (OUTPATIENT)
Dept: BARIATRICS/WEIGHT MGMT | Age: 42
End: 2022-02-11

## 2022-02-11 VITALS
DIASTOLIC BLOOD PRESSURE: 87 MMHG | RESPIRATION RATE: 20 BRPM | SYSTOLIC BLOOD PRESSURE: 134 MMHG | BODY MASS INDEX: 40.43 KG/M2 | WEIGHT: 315 LBS | HEIGHT: 74 IN | HEART RATE: 79 BPM | TEMPERATURE: 97.9 F

## 2022-02-11 VITALS — HEIGHT: 74 IN | WEIGHT: 315 LBS | BODY MASS INDEX: 40.43 KG/M2

## 2022-02-11 DIAGNOSIS — Z00.8 NUTRITIONAL ASSESSMENT: ICD-10-CM

## 2022-02-11 DIAGNOSIS — Z71.3 NUTRITIONAL COUNSELING: Primary | ICD-10-CM

## 2022-02-11 DIAGNOSIS — K91.2 MALNUTRITION FOLLOWING GASTROINTESTINAL SURGERY: Primary | ICD-10-CM

## 2022-02-11 PROCEDURE — 99999 PR OFFICE/OUTPT VISIT,PROCEDURE ONLY: CPT | Performed by: DIETITIAN, REGISTERED

## 2022-02-11 PROCEDURE — 99212 OFFICE O/P EST SF 10 MIN: CPT

## 2022-02-11 PROCEDURE — 99024 POSTOP FOLLOW-UP VISIT: CPT | Performed by: SURGERY

## 2022-02-11 NOTE — PATIENT INSTRUCTIONS
[facility]  Dietary Follow-up Appointment Instructions    Mateo Malave   Date: 2/11/2022     The RD / LD reviewed the following instructions with the patient and handouts have been given. Pt. is able to verbalize instruction and has been instructed to call with any problems or complications. If patient is not able to comply with the dietary advancement or dietary or supplement instructions given pt. is instructed to call the [facility] at [phone]. If [facility] is closed and problems occur patient is instructed to go to [id number] [facility] Emergency Department and have his / her surgeon paged. Current Diet Instruction: Handouts Given  1. Pt instructed he / she must keep daily food records and bring to all follow-up appointments. 2. Pt. has been instructed to slow down eating habits and chew food 30 - 35 times per bite. Rd / Ld reviewed with patient that eating too fast can cause food to get stuck or create nausea and vomiting after bariatric surgery. Rd / Ld highly encouraged patient to set a timer and really count bites to help with slowing down eating habits this will also create more of a sensation of fullness and satiety. 3. Patient was instructed on the importance of increasing water intake to 48 - 64 oz. of water total daily. Pt. was also instructed he / she is allowed an additional 30 oz. of sugar free caffeine free clear liquid beverages for a total of 90 oz. of fluid total daily. Pt. was able to verbalize how he / she can get more water and fluids within the diet. Pt. verbalized understanding.      Bariatric soft diet with MVI, Calcium & Protein Supplements        Care Plan:  [id number]   Stool Softener / Fiber Supplement  / Surgeons Bowel Protocol After Weight Loss Surgery  Written By: Jaylyn Urbina  Definition:  Constipation is the passage of small amounts of hard, dry bowel movements, usually fewer than three times a week. People who are constipated may find it difficult and painful to have a bowel movement. Other symptoms of constipation include feeling bloated, uncomfortable, and sluggish. What Causes Constipation? To understand constipation, it helps to know how the colon (large intestine) works. As food moves through it, the colon absorbs water while forming waste products, or stool. Muscle contractions in the colon push the stool toward the rectum. By the time stool reaches the rectum, it is solid because most of the water has been absorbed. The hard and dry stools of constipation occur when the colon absorbs too much water. This happens because the colon's muscle contractions are slow or sluggish causing the stool to move through the colon too slowly causing too much water being able to be absorbed and a hard stool forming. Why Does This Happen After Bariatric Surgery? ? Most patients do not drink enough Water. That's right. You need to be drinking at the minimum 64 ounces of just plain water a day and additional 30 oz. of other non-caloric beverages. All other beverages do not count as water intake and will cause constipation. Adding water to the diet adds fluid to the colon and bulk to the stools, making bowel movements softer and easier to pass. ? Avoid all other beverages besides water especially coffee and tea when you are constipated. ? Most patients do not get enough fiber in the diet. We recommend at least 25 - 35 grams of fiber daily from your diet starting 3 months post-op. The most common cause of constipation is a diet low in fiber found in vegetables, fruits, and whole grains and high in fats found in cheese, eggs, and meats. People who eat plenty of high-fiber foods are less likely to become constipated. Refer to your high fiber food's handout - this is based on what stage of the diet you may be in and what foods are tolerated at this stage.   ? Lack of exercise can lead to constipation - Regular activity especially walking helps push food through the intestines and helps to push waste through the colon. Please be sure to be following your exercise guidelines. ? If constipated eliminate Cheese, Eggs, Applesauce, Apples, Bananas, Rice and Bread from the diet you can resume in small amounts once the constipation resolves and these foods have been incorporated into your diet stage. ? If you are 2 -6 weeks post bariatric surgery you may add high fiber foods such as oatmeal, oat bran at this time. ? If your diet has progressed and you are 12 weeks post bariatric surgery you may add other high fiber foods such as fresh fruits, fresh vegetables, and whole grains. Track your fiber intake daily the goal is to have 25 - 35 grams of fiber total daily starting at your 3 month follow-up appointment. Remember half the fiber intake can come from your fiber supplement the other half should come from dietary intake. Refer to the High Fiber Foods Handout. Surgeon Bowel Instruction:    Your surgeon has instructed you on the following:    ? Start by following the [address] [place] [address] [facility] recommendations of taking a stool softener -  Colace or Docusate 100mg gel tablet once at breakfast and once before bed. The day you are discharged from the hospital until your 6 week follow-up appointment. ?  Start by adding a Sugar Free Chewable Fiber Supplement at your 2 week follow-up appointment lifelong -  Per the surgeons protocol you should be taking a fiber supplement lifelong since your two week follow-up appointment. Your surgeon recommends a daily approved Fiber supplement 15 grams total daily. If you are just introducing a Fiber supplement the 15 grams should be introduced gradually - 5 grams of a fiber supplement daily the 1st week, 10 grams of a fiber supplement daily the 2nd week and 15 grams of a fiber supplement daily the third week.   Pt is instructed to continue the 15 grams of a fiber supplement daily. James Lewis reviewed. Pt verbalized understanding. Select One from Below     o Vitafusion Sugar Free Fiber Well Chewable Fiber Gummies - Follow the [facility] instructions of taking 2 Fiber Gummies 3 times Daily. (15 grams)    -or-    o Benefiber Original - Follow the [facility] instructions of taking 1tablespoon 3 times daily mixed in water or sugar free beverage of choice. (13.5 grams)     -or-    o Metamucil Sugar Free Original - Follow the [facility] instruction of taking 1 rounded tablespoon 2 times daily mixed in water or sugar free beverage of choice. (18 grams)    ? You need to be drinking at the minimum 64 ounces of just plain water a day and an additional 30 oz. of other non-caloric, non-carbonated, non-caffeinated beverages. This will make a total of 90oz or greater daily. Unless you are on a fluid restriction. ? You can add a Probiotic. The [address] [place] [address] [facility]  recommends a Probiotic that contains 15 billion cfu's in one capsule and the first  ingredient needs to be Lactobacillus Acidophilus. The instructions are to take 1 capsule daily if you have no results increase to 2 capsules daily. ? Increase your activity and walk as tolerated daily. Follow your exercise guidelines. ? If you find that you are still having trouble with constipation after trying the  suggestions mentioned above please contact the [address] [place] [address] [facility] [phone].                                                                   [facility] Staff Stool Softener and Fiber Instruction Education for Patients:       D/C from Hospital 2-week F/U Appt: 6-week F/U Appt: 3 Month F/U Appt: 6 -Month - On   Water Intake 48 - 64 oz Water 64 oz of water plus 30 oz other SF / FF/ NC / CF Beverages 64 oz of water plus 30 oz other SF / FF/ NC / CF Beverages 64 oz of water plus 30 oz other SF / FF/ NC / CF Beverages 64 oz of water plus 30 oz other SF / FF/ NC / CF Beverages   Colace 100 mgs 2 times daily 100 mgs 2 times daily 100 mgs 2 times daily. Pt should be instructed to stop the Colace Pt should be off the Colace Pt should be off the Colace   Fiber Supplement   ____________ Add Fiber Supplement - Pt needs educated at 2 weeks Appt Continue Fiber Supplement -Continue Pt Education Continue Fiber Supplement -Continue Pt Education Continue Fiber Supplement -Continue Pt Education   Fiber Foods   ____________     ____________     ____________   Goal is 25 - 35 grams of Fiber Total Daily - Pt needs Educated Goal is 25 - 35 grams of Fiber Total Daily - Pt needs Educated                                     Vitamin and Supplement Instruction:  Vitamin and Mineral Supplementation   After Gastric Bypass and Sleeve Gastrectomy Surgery      Patient is able to verbalize the above supplements must be taken daily in order to insure proper health and nutrition, and prevent nutrient deficiencies. The patient is aware that not complying can lead to the patient placing him/her self at risk for complications. RD / LD reviewed with patient the importance of dietary supplements. Pt. verbalized understanding. Vitamins:   Bariatric Advantage Chewable Multi-Formula (1 tablet 2 times daily) - and - Bariatric Advantage Chewable Iron 29 mg (1 tablet daily)    Other Vitamin Deficiencies:    Vitamin D - Dry Vitamin D3 5,000iu every day. Calcium Supplements:  Calcium Supplement: Total daily intake should be 1500 mg from calcium citrate and 800 - 1000 iu Vitamin D daily. Plus three servings of low-fat dairy for a total daily intake of 1800 - 2200 mg of Calcium. Bariatric Advantage Calcium 500 Chews (1 tablet 3 times daily) - contains Calories    Pt. given copy.      [facility]   Constipation  Written By: Kahlil Coreas  Definition:  Constipation is the passage of small amounts of hard, dry bowel movements, usually fewer than three times a week. People who are constipated may find it difficult and painful to have a bowel movement. Other symptoms of constipation include feeling bloated, uncomfortable, and sluggish. What Causes Constipation? To understand constipation, it helps to know how the colon (large intestine) works. As food moves through it, the colon absorbs water while forming waste products, or stool. Muscle contractions in the colon push the stool toward the rectum. By the time stool reaches the rectum, it is solid because most of the water has been absorbed. The hard and dry stools of constipation occur when the colon absorbs too much water. This happens because the colon's muscle contractions are slow or sluggish causing the stool to move through the colon too slowly causing too much water being able to be absorbed and a hard stool forming. Why Does This Happen After Bariatric Surgery? ? Most patients do not drink enough Water. That's right. You need to be drinking at the minimum 64 ounces of just plain water a day and additional 30 oz. of other non-caloric beverages. All other beverages do not count as water intake and will cause constipation. Adding water to the diet adds fluid to the colon and bulk to the stools, making bowel movements softer and easier to pass. ? Avoid all other beverages besides water especially coffee and tea when you are constipated. It does not matter if it is regular or decaffeinated. ? Most patients do not get enough fiber in the diet. We recommend at least 25 - 35 grams of fiber daily from your diet. The most common cause of constipation is a diet low in fiber found in vegetables, fruits, and whole grains and high in fats found in cheese, eggs, and meats. People who eat plenty of high-fiber foods are less likely to become constipated.   Refer to your high fiber food's handout - this is based on what stage of the diet you may be in and what foods are tolerated at this stage. ? Lack of exercise can lead to constipation - Regular activity especially walking helps push food through the intestines and helps to push waste through the colon. Please be sure to be following your exercise guidelines. ? Others - Medications, Irritable Bowel Syndrome, changes in life or routine,        (e.g. surgery and travel), abuse of laxatives, ignoring the urge to have a bowel          movement, problems with the colon, rectum or intestinal function. Tips to Control Constipation:    Your surgeon has instructed you on the following:  ? Start by following the [address] [place] [address] [facility] recommendations of taking a stool softener -  Colace or Docusate 100mg gel tablet once at breakfast and once before bed. ? Start by adding a Sugar Free Chewable Fiber Supplement. Most fiber supplements contain 3 grams of fiber per chew/tablet. The [place] [address] [facility] recommends starting with 2 chews/tablets a day and increasing to 5 if you have no results. ? You need to be drinking at the minimum 64 ounces of just plain water a day and an additional 30 oz. of other non-caloric, non-carbonated, non-caffeinated beverages. This will make a total of 90oz or greater daily. Unless you are on a fluid restriction. ? You can add a Probiotic. The [address] [place] [address] [facility]  recommends a Probiotic that contains 15 billion cfu's in one capsule and the first  ingredient needs to be Lactobacillus Acidophilus. The instructions are to take 1 capsule daily if you have no results increase to 2 capsules daily. ? Increase your activity and walk as tolerated daily. Follow your exercise guidelines. ? If you find that you are still having trouble with constipation after trying the  suggestions mentioned above please contact the [address] [place] [address] [facility] [phone]. ?  Eliminate Cheese, Eggs, Applesauce, Apples, Bananas, Rice and Bread from the diet you can resume in small amounts once the constipation resolves. ? If you are 2 -6 weeks post bariatric surgery you may add high fiber foods such as oatmeal, oat bran at this time. ? If your diet has progressed and you are 12 weeks post bariatric surgery you may add other high fiber foods such as fresh fruits, fresh vegetables, and whole grains. Track your fiber intake daily the goal is to have 25 - 35 grams of fiber total daily. Remember half the fiber intake can come from your fiber supplement the other have should come from dietary intake. Refer to the High Fiber Foods Handout. ? Drink 2 ounces of sugar free prune juice in the evening before bedtime. ? Try making a PAB before bedtime - 2 ounces unsweetened applesauce, 1-tablespoon crushed bran flakes, and 1-tablespoon prune juice - Mix together serve cold or warm before bed in the evening. We would also encourage you to increase your water intake to 90 oz and increase the Vitafusion Chewable Fiber Gummies to 6 daily. If you are using a generic of the Vitafusion this can also be contributing to the problem since the fiber ingredients are not the same and do different things. Also we would enc to increase walking which will also stimulate the bowels. I attached instruction below that I use with my pts. Increased fluid intake should go hand in hand with increased protein intake. Protein requires a lot of processing by your kidneys and liver. After protein is unfolded by gastric juices in your stomach, it gets broken into amino acids by pancreatic enzymes in your intestines. Free amino acids must then be processed by your liver, and the resulting byproducts are filtered into urine by your kidneys. Drinking extra water can help your body dilute and handle an increased protein load. Many high protein foods are lacking in an important nutrient for regularity, fiber.  Animal proteins, in particular, tend to be void of fiber. Fiber promotes regularity by adding bulk to the stool and exercising intestinal muscles to push digested material through your system. Insoluble fiber stays intact through digestion, while soluble fiber mixes with fluid in the gastrointestinal tract. Beans and chickpeas are an excellent source of both lean protein and fiber. As with protein, same goes with fiber you have to drink plenty of fluid when eating more fiber to prevent constipation. (FYI: This is why we have to use fiber supplements in the beginning and only certain one's)     Constipation is generally defined as hard stools that are difficult to pass. Because protein can increase your fluid needs, put some extra effort into avoiding dehydration that can lead to constipation. Make a habit of bringing a water bottle along in the car or drinking a full glass of water after each meal to be sure you are meeting your needs. Adequate fiber and fluids are your best preventative measures against constipation. Get in the habit of mixing some fiber powder in with your protein shakes. Increase your water intake to help your body handle the extra protein and to keep constipation at [facility] 994. If you go more than two or three days without a bowel movement, talk to your surgeon.

## 2022-02-11 NOTE — PATIENT INSTRUCTIONS
Please continue to take your vitamin and mineral supplements as instructed. If you received a blood work prescription today for laboratory monitoring due prior to your next routine follow-up visit, please have this blood work obtained 10 to 14 days prior to your next visit. It is important to fast for 12 hours prior to routine weight loss surgery blood work, EXCEPT for drinking water, to ensure accuracy of results. Please report nausea, vomiting, abdominal pain, or any other problems you experience to your surgeon. For problems related to weight loss surgery, it is best to go to [address] [place] [address] Emergency Department and have your surgeon paged.

## 2022-02-11 NOTE — PROGRESS NOTES
6 week LYRGB f/u, labs are in [facility] and is scheduled with Denny Hannah for dietary. Water intake is at least 64 oz daily, having bowel movements daily, vitamin intake is good and getting protein through shakes.

## 2022-02-11 NOTE — PROGRESS NOTES
Medical Nutrition Therapy (MNT) Assessment   Pt is aware this phone call conversation will be documented and is in agreement to the documentation: Pt verbalized - Yes    Pt. Name: Donya Hutchison   Date:2/11/2022  F/U Appt: Sx Type 6 week RYGB F/U Appointment     Ht 6' 2\" (1.88 m)   Wt (!) 372 lb (168.7 kg)   BMI 47.76 kg/m²  Height: 6' 2\" (1.88 m) Weight: (!) 372 lb (168.7 kg)   IBW:ideal body weight 197 lbs % EBWL: 19%     Wt Readings from Last 3 Encounters:   02/11/22 (!) 372 lb (168.7 kg)   02/11/22 (!) 372 lb (168.7 kg)   01/07/22 (!) 389 lb (176.4 kg)                     Protein supplements: Pt. is currently using the following protein supplement Nectar and consuming the following grams of protein - 66 grams from his protein shake plus protein foods. Rd / Ld reviewed with patient based on 1.0 gram per kg of IBW patient needs 90 - 100 grams of protein total daily.     Subjective:                   Current MNT:       Bariatric puree diet with MVI, Calcium & Protein Supplements     MNT Advanced to:     Bariatric soft diet with MVI, Calcium & Protein Supplements      Allergies and Food Allergies and Food Intolerances: Lactose Intolerance - Milk    Nutritional Data  No - Poor appetite more than 5 days     No - NPO or clear liquid more than 3 days     No - Problem Chewing      No - Problem Swallowing      No - Problem Mouth Pain      No - Problem Denture  No - Missing Teeth         No - Pressure Sore    No - Open Wound    No - Surgical Wound  No - Documented: Sepsis or Infection    No - Nausea  No - Vomiting    [facility] Bariatric Surgeons Bowel Protocol:  Patient states he / she has the following bowel movements per week - Daily or every other day  no - Diarrhea  No - Steatorrhea  Yes - Constipation - Only once since sx  When was your last bowel movement  - This morning    How much plain water are you drinking daily - 64 oz  What other beverages / fluids are you drinking daily and the amount  - Protein shakes    No - Are you taking Colace daily  What amount of Colace are you taking daily  Pt is not taking . Enc pt to take the Colace 100 mgs twice daily    Yes - Are you taking Sugar Free Chewable Fiber Gummies / Supplements  What amount of Sugar Free Chewable Fiber Gummies are you taking daily  - Fiber Gummies - 2 daily - Pt instructed to increase to 6 daily  See AVS    No - Did your surgeon discuss constipation with you? No - Did your surgeon discuss increasing water intake? How much water were you instructed to take daily? James Rios reviewed today -  Patient was instructed by James Rios on the importance of increasing water intake to 48 - 64 oz. of water total daily. Pt. was also instructed he / she is allowed an additional 30 oz. of sugar free caffeine free clear liquid beverages for a total of 90 oz. of fluid total daily. Pt. was able to verbalize how he / she can get more water and fluids within the diet. Pt. verbalized understanding. Yes - Did your surgeon discuss continuing Colace? How much Colace did your surgeon instruct you to take? James Rios reviewed today - Per the surgeons protocol you should be taking since the day of your surgery Colace - 100 mgs 1 tablet 2 times daily until your 6 week F/U appointment. James RIOS reviewed. See After Visit Summary. No - Did your surgeon discuss stopping Colace? Yes - Did your surgeon discuss starting Fiber Gummies / Supplements? How much Fiber Gummies did your surgeon instruct you to take? James Rios reviewed today Per the surgeons protocol you should be taking a fiber supplement lifelong since your two week follow-up appointment. Your surgeon recommends a daily approved Fiber supplement 15 grams total daily. If you are just introducing a Fiber supplement the 15 grams should be introduced 5 grams of a fiber supplement daily the 1st week, 10 grams of a fiber supplement daily the 2nd week and 15 grams of a fiber supplement daily the third week.   Pt is instructed to continue the 15 grams of a fiber supplement daily. James Lewis reviewed. See After Visits Summary. Did your surgeon discuss any other medications / supplements to take daily to move your bowels and avoid constipation? Yes -  Patient was provided today the Constipation Handout - James Lewis reviewed Handout - Pt. verbalized understanding. See AVS  Yes James Lewis reinforced pt needs to consume the following - Water Intake should be 64 0 90 oz, Fiber Chews 15 grams,        No - Hair loss   No - Weight regain       Yes - Acid Reflux - Pt instructed to stay on Omeprazole  No - Dumping Syndrome      No - Food gets stuck  No - Are you eating solids - should not be eating solids until 6 weeks post-op. not applicable - Night Time Coughing  not applicable -  B Ping Noted  Yes - Are you chewing thoroughly  - Does not take effect until 6 weeks post-op  No - Are you hungry after eating     How many meals a day 6  / Portions Sizes of Meals are 3 to 4 oz         Labs: 2/2/22 - Glucose 103H, Prealbumin 16L, Vitamin B12 1031H    Supplements: Bariatric Advantage Multi-Vitamin 1 tablet 2 times Daily, Bariatric Advantage 29 mgs Iron 1 tablet every other day pt is taking on his own Daily, Bariatric Advanatge Calcium Lozenges 1 tablet 3 times Daily , Dry Vitamin D3 5,000iu every day    Estimated Daily Nutritional Needs: Based on Bariatric procedure for Wt. Loss  Energy: Will be calculated at Maintenance Stage    Protein: Average 60 - 80 gms Daily - See individual needs listed above based on IBW    MNT Plan and Additional Education: JAMES/GABRIEL reviewed Bariatric Soft Diet. Encouraged pt to meet protein and fluid needs daily. Pt. verbalized understanding. Handouts given. Pt. instructed to call with questions. Patient was instructed on the importance of increasing water intake to 48 - 64 oz. of water total daily.   Pt. was also instructed he / she is allowed an additional 30 oz. of sugar free caffeine free clear liquid beverages for a total of 90 oz. of fluid total daily. Pt. was able to verbalize how he / she can get more water and fluids within the diet. Pt. verbalized understanding. A high fiber supplement with plenty of fluids (up to 8 glasses of water daily) is suggested to relieve these symptoms. Metamucil, 1 tablespoon once or twice daily can be used to keep bowels regular if needed. Patient was instructed by the RD / LD based on 24 Hour Recall, Food Records and review of labs to increase oral protein within the diet by selecting high protein foods. RD / LD reviewed high protein foods with the patient and reviewed through meal planning that 1/2 if not 3/4 of every meal should consist of some source of oral protein intake. Pt. is aware his / her protein needs are 90 - 100 grams daily. Pt. is able to verbalize how to increase oral protein intake within his / her own diet. No 1. Pt is consuming his / her recommended amount of protein within the diet based on patients Ideal Body Weight. .    Yes 2. Pt is using the recommended Bariatric approved protein supplement and taking in the correct amount of protein daily. Pt is able to verbalize how to mix his / her protein supplement correctly to meet pts needs. Pt verbalized understanding. Yes 3. Pt is using the recommended Bariatric approved Multi-Vitamin, Bariatric approved Calcium, Bariatric approved Iron supplement or Bariatric approved Vitamin D and taking the correct dose. Pt was educated per his / her Bariatric Surgeons protocol he / she needs the following daily -  Bariatric Advantage Multi-Vitamin 1 tablet 2 times Daily, Bariatric Advantage 29 mgs Iron 1 tablet Daily, Bariatric Advantage Calcium Lozenges 1 tablet 3 times Daily , Dry Vitamin D3 5,000iu every day. Pt verbalized understanding. Yes 4. Pt kept daily food records. Pt is aware food records need to be kept life-long daily and brought to all follow-up appointments in order to show compliancy after weight loss surgery.  Pt verbalized understanding. No 5. Pt is taking the correct stool softener for the first 6 weeks with the correct dose. Pt is also taking the correct fiber supplement with the correct dose starting at his / her 2 week f/u appointment. Pt verbalized understanding. See above instruction and AVS.     No 6. Pt is drinking 64 - 90 oz of plain water daily. Patient was instructed on the importance of increasing water intake to 48 - 64 oz. of water total daily. Pt. was also instructed he / she is allowed an additional 30 oz. of sugar free caffeine free clear liquid beverages for a total of 90 oz. of fluid total daily. Pt. was able to verbalize how he / she can get more water and fluids within the diet. Per bariatric surgeons protocol after weight loss surgery. Pt. verbalized understanding. Yes 7. Pt achieved his / her percentage of excess body weight loss at his / her f/u appointments and is on track to reach his / her weight loss goal.    Yes 8. Pt is weighing and measuring all foods using a food scale and measuring cups. Pt reports portion sizes are 3 to 4 oz in size. Portion sizes are not exceeding 6 ounces total per meal lifelong. Pt verbalized understanding. .     Yes 9. Pt is not experiencing Dumping Syndrome from food / beverage consumption. Yes 10. Pt is complying with all stages and consistencies of the bariatric diet and is not eating or drinking foods / beverages that is not listed on the diet at this stage. Yes (Staff FYI) - Pt is not drinking carbonated beverages, alcoholic beverages or juices at this time. Compliancy Scale  - After Weight Loss Surgery :  7 Good - Dietary Compliance - This is based on patient following the Medical Nutrition Therapy protocol after Weight Loss Surgery  7 to 10 Points - Good (70% - to 100%) -  Pt is following what he / she has been instructed on at the time of this visit.   4 to 7 Points - Fair (40% to 70%) - Pt has areas that he / she needs to work on in order to be compliant with the bariatric protocol after weight loss surgery. 0 to 4 Points - Poor (0% - 40%) - Pt is failing to follow the instructions given to the pt. James Lewis has concerns pt may be creating harm. Pt was offered additional dietary counseling appointments to help pt make the necessary lifestyle changes to show compliancy after weight loss surgery. MEDICAL NUTRITION THERAPY (MNT) - Nutrition Care Plan   (The patient has been educated and given written education material that reinforces the following dietary guidelines for Bariatric Surgery)  Goal:  Patient able to verbalize the following dietary concepts:  3 to 4 ounce portions per meal     6 small meals daily      60 to 80 grams of protein on average see individual protein needs   48 to 64 ounces of fluid daily (water)     Always consume protein item first with all meals   Pt is aware wt loss is pt controlled. Slow Meals - 30-35 chews per bite / Meals 30 - 45 minutes long            The RD / LD reviewed with the patient that the dietary goals of the bariatric patient is to ensure that patient is able to meet established nutritional needs for a Bariatric Surgery Patient at this time in order to promote healing, prevent significant weight changes, prevent skin breakdown and abnormal lab values, prevent complications, and tolerance to diet and texture of foods. Pt is able to identify proper food choices and needed changes and is able to explain proper food preparation. RD / LD reviewed compliance with assigned diet stages, volume of food and drink consumed, timing of meals, amount of protein and energy intake, daily vitamin and mineral supplementation, identify food cravings and any adverse reactions associated with intake of food and drink.   RD / LD uses behavioral tools such as goal setting, MI, and self-monitoring, to reinforce the anatomic and physiologic effects of the surgery, so that the described behaviors become more of a habit not simply a reaction to the altered anatomy. Care Plan:   Yes - Rd/Ld Addressed Food Records or 24-Hour Recall  Yes - Rd / Ld encouraged patient to exercise at least 30 minutes daily  Yes - Rd / Ld instructed patient on how to increase oral protein intake within the diet. Pt. can verbalize his / her individual protein needs at this visit. Yes - Rd / Ld instructed the patient on how to increase the use of protein supplements within the diet if appropriate at this time. Yes - Pt. was instructed on how to increase water intake. Patient will need to consume 48 - 64 oz. of just plain water in addition to 30 oz. of non-caloric beverages. Yes - Handouts given to patient - Also see After Visit Summary in addition to paper copy diet instruction. Yes - RD / LD encouraged oral intake    Yes -  RD / LD encouraged pt. to keep food records daily.       Yes - Pt. is able to verbalize diet concepts      Yes - Constipation Handout Given and Reviewed - Part of surgeons Bowel Protocol - See After Visit Summary    Yes - High Fiber Handout Given and Reviewed - Part of surgeons Bowel Protocol - See After Visit Summary        No - Ulcer Handout Given and Reviewed          No - Pt. was instructed to continue current MNT   Yes - Pt. diet was advanced to the following stage ( See above)   No - Supplements: initiated (See list below  - Pt. given instruction)     No - Supplemental foods:______________________  No - Pt. was placed on a altered meal schedule

## 2022-02-11 NOTE — PROGRESS NOTES
Junior Langford Devan  2/11/2022  Laparoscopic Devika-en- Y Gastric Bypass   6 weeks Post-Operative Follow-up. Subjective:   Brokc Richmond is a 39 y.o. male  six weeks post Laparoscopic Devika-en-Y Gastric Bypass. He reports back pain from sleeping wrong. The patient is not having any abdominal pain. Taking the pureed diet well, no nausea, no pain, wounds all healed. He is having constipation problems intermittent now on fiber. He is not having swallowing difficulty, is compliant most of the time with the multivitamins and calcium + Vit D. He is meeting fluid recommendations of at least 64 ounces per day and is meeting protein recommendations. Exercise: walking 45 minutes/day- 5 days/week. Weight: (!) 372 lb (168.7 kg)  Today's weight represents a weight loss of 43 pounds. Prior to Admission medications    Medication Sig Start Date End Date Taking?  Authorizing Provider   Calcium Carbonate (CALCI-CHEW PO) Take 1 tablet by mouth 3 times daily   Yes Historical Provider, MD   Ferrous Sulfate (IRON PO) Take 1 tablet by mouth daily   Yes Historical Provider, MD   Multiple Vitamin (MVI, BARIATRIC ADVANTAGE MULTI-FORMULA, CHEW TAB) Take 1 tablet by mouth 2 times daily   Yes Historical Provider, MD   vitamin D (CHOLECALCIFEROL) 125 MCG (5000 UT) CAPS capsule Take 5,000 Units by mouth daily   Yes Historical Provider, MD   omeprazole (PRILOSEC) 20 MG delayed release capsule Take 1 capsule by mouth daily 12/3/21  Yes Fatuma Hendrickson MD      Physical exam:  VITALS:   /87 (Site: Left Lower Arm, Position: Sitting, Cuff Size: Large Adult)   Pulse 79   Temp 97.9 °F (36.6 °C) (Temporal)   Resp 20   Ht 6' 2\" (1.88 m)   Wt (!) 372 lb (168.7 kg)   BMI 47.76 kg/m²    General appearance: AAO, NAD  Eyes: PERRL, EOMI, red conjunctiva  Lungs: CTAB, no wheeze, no rhonchi  Chest wall: atraumatic, no tenderness, no echymosis or abrasions  Heart: reg rate, no murmur  Abdomen: soft, nondistended, tender minimal, no hernias, no peritioneal signs, incisions well healed, he has no rash below pannus  Extremities: full ROM all 4 ext, no gross motor or sensory deficits  Pulses: 2+ distal  Skin: warm and dry  Neurologic: spontanous eye opening, purposeful, follows complex commands      Assessment: he is 6 weeks post Laparoscopic Devika-en- Y Gastric Bypass. Plan:  Transition to the soft high protein diet. Eat small portions very slowly and chew until the food is liquified before swallowing. Track protein and fluids and bring to the next appointment, pre-plan meals and increase intake of: proteins and fiber. Follow up in 6 weeks and contact me if questions arise in the meantime. Exercise 7 days a week at least 30 minutes. Repeat labs before the next appointment. Make sure the bowel move daily by taking fiber.        Physician Signature: Electronically signed by Dr. Amparo Velasco MD

## 2022-03-10 ENCOUNTER — HOSPITAL ENCOUNTER (OUTPATIENT)
Age: 42
Discharge: HOME | End: 2022-03-10
Payer: COMMERCIAL

## 2022-03-10 DIAGNOSIS — K91.2 MALNUTRITION FOLLOWING GASTROINTESTINAL SURGERY: ICD-10-CM

## 2022-03-10 LAB
ALBUMIN SERPL-MCNC: 4.3 G/DL (ref 3.5–5.2)
ALP BLD-CCNC: 98 U/L (ref 40–129)
ALT SERPL-CCNC: 47 U/L (ref 0–40)
ANION GAP SERPL CALCULATED.3IONS-SCNC: 12 MMOL/L (ref 7–16)
AST SERPL-CCNC: 26 U/L (ref 0–39)
BILIRUB SERPL-MCNC: 0.9 MG/DL (ref 0–1.2)
BUN BLDV-MCNC: 11 MG/DL (ref 6–20)
CALCIUM SERPL-MCNC: 9.5 MG/DL (ref 8.6–10.2)
CHLORIDE BLD-SCNC: 102 MMOL/L (ref 98–107)
CHOLESTEROL, TOTAL: 108 MG/DL (ref 0–199)
CO2: 25 MMOL/L (ref 22–29)
CREAT SERPL-MCNC: 0.7 MG/DL (ref 0.7–1.2)
FERRITIN: 176 NG/ML
FOLATE: >20 NG/ML (ref 4.8–24.2)
GFR AFRICAN AMERICAN: >60
GFR NON-AFRICAN AMERICAN: >60 ML/MIN/1.73
GLUCOSE BLD-MCNC: 107 MG/DL (ref 74–99)
HCT VFR BLD CALC: 48.6 % (ref 37–54)
HEMOGLOBIN: 15.4 G/DL (ref 12.5–16.5)
MCH RBC QN AUTO: 29.7 PG (ref 26–35)
MCHC RBC AUTO-ENTMCNC: 31.7 % (ref 32–34.5)
MCV RBC AUTO: 93.6 FL (ref 80–99.9)
PDW BLD-RTO: 13.9 FL (ref 11.5–15)
PLATELET # BLD: 218 E9/L (ref 130–450)
PMV BLD AUTO: 11.8 FL (ref 7–12)
POTASSIUM SERPL-SCNC: 3.9 MMOL/L (ref 3.5–5)
PREALBUMIN: 17 MG/DL (ref 20–40)
RBC # BLD: 5.19 E12/L (ref 3.8–5.8)
SODIUM BLD-SCNC: 139 MMOL/L (ref 132–146)
TOTAL PROTEIN: 7.2 G/DL (ref 6.4–8.3)
TRIGL SERPL-MCNC: 81 MG/DL (ref 0–149)
VITAMIN B-12: 1069 PG/ML (ref 211–946)
WBC # BLD: 6 E9/L (ref 4.5–11.5)

## 2022-03-10 PROCEDURE — 84134 ASSAY OF PREALBUMIN: CPT

## 2022-03-10 PROCEDURE — 85027 COMPLETE CBC AUTOMATED: CPT

## 2022-03-10 PROCEDURE — 84425 ASSAY OF VITAMIN B-1: CPT

## 2022-03-10 PROCEDURE — 82728 ASSAY OF FERRITIN: CPT

## 2022-03-10 PROCEDURE — 80053 COMPREHEN METABOLIC PANEL: CPT

## 2022-03-10 PROCEDURE — 82465 ASSAY BLD/SERUM CHOLESTEROL: CPT

## 2022-03-10 PROCEDURE — 84478 ASSAY OF TRIGLYCERIDES: CPT

## 2022-03-10 PROCEDURE — 36415 COLL VENOUS BLD VENIPUNCTURE: CPT

## 2022-03-10 PROCEDURE — 84630 ASSAY OF ZINC: CPT

## 2022-03-10 PROCEDURE — 82746 ASSAY OF FOLIC ACID SERUM: CPT

## 2022-03-10 PROCEDURE — 82607 VITAMIN B-12: CPT

## 2022-03-17 LAB — ZINC: 97.1 UG/DL (ref 60–120)

## 2022-03-21 ENCOUNTER — HOSPITAL ENCOUNTER (OUTPATIENT)
Age: 42
Discharge: HOME | End: 2022-03-21
Payer: COMMERCIAL

## 2022-03-21 PROCEDURE — 84425 ASSAY OF VITAMIN B-1: CPT

## 2022-03-25 ENCOUNTER — INITIAL CONSULT (OUTPATIENT)
Dept: BARIATRICS/WEIGHT MGMT | Age: 42
End: 2022-03-25

## 2022-03-25 ENCOUNTER — OFFICE VISIT (OUTPATIENT)
Dept: BARIATRICS/WEIGHT MGMT | Age: 42
End: 2022-03-25
Payer: COMMERCIAL

## 2022-03-25 VITALS — HEIGHT: 74 IN | BODY MASS INDEX: 40.43 KG/M2 | WEIGHT: 315 LBS

## 2022-03-25 VITALS
RESPIRATION RATE: 20 BRPM | HEIGHT: 74 IN | HEART RATE: 76 BPM | TEMPERATURE: 97.3 F | DIASTOLIC BLOOD PRESSURE: 93 MMHG | SYSTOLIC BLOOD PRESSURE: 151 MMHG | WEIGHT: 315 LBS | BODY MASS INDEX: 40.43 KG/M2

## 2022-03-25 DIAGNOSIS — E66.01 MORBID OBESITY DUE TO EXCESS CALORIES (HCC): ICD-10-CM

## 2022-03-25 DIAGNOSIS — K91.2 MALNUTRITION FOLLOWING GASTROINTESTINAL SURGERY: Primary | ICD-10-CM

## 2022-03-25 DIAGNOSIS — Z71.3 DIETARY COUNSELING: Primary | ICD-10-CM

## 2022-03-25 PROCEDURE — 99213 OFFICE O/P EST LOW 20 MIN: CPT | Performed by: SURGERY

## 2022-03-25 PROCEDURE — 99999 PR OFFICE/OUTPT VISIT,PROCEDURE ONLY: CPT

## 2022-03-25 PROCEDURE — 99212 OFFICE O/P EST SF 10 MIN: CPT

## 2022-03-25 NOTE — PATIENT INSTRUCTIONS
Please continue to take your vitamin and mineral supplements as instructed. If you received a blood work prescription today for laboratory monitoring due prior to your next routine follow-up visit, please have this blood work obtained 10 to 14 days prior to your next visit. It is important to fast for 12 hours prior to routine weight loss surgery blood work, EXCEPT for drinking water, to ensure accuracy of results. Please report nausea, vomiting, abdominal pain, or any other problems you experience to your surgeon. For problems related to weight loss surgery, it is best to go to [address] [place] [address] Emergency Department and have your surgeon paged.

## 2022-03-25 NOTE — PROGRESS NOTES
Medical Nutrition Therapy (MNT) Assessment     Pt. Name: Mehdi Murray   Date:3/25/2022  F/U Appt: Sx Type 3 mos rygb    Food Records Kept: Yes    Using an eric daily  24Hour Recall Completed at Office:No    Ht 6' 2\" (1.88 m)   Wt (!) 346 lb (156.9 kg)   BMI 44.42 kg/m²  Height: 6' 2\" (1.88 m) Weight: (!) 346 lb (156.9 kg)   IBW:ideal body weight   197 lbs  % EBWL: 31%     Wt Readings from Last 3 Encounters:   03/25/22 (!) 346 lb (156.9 kg)   03/25/22 (!) 346 lb (156.9 kg)   02/11/22 (!) 372 lb (168.7 kg)                     Protein supplements: Pt. is currently using the following protein supplement Whey protein supplement from [employer], [employer] protein [employer] and [employer] and consuming the following grams of protein 90. Rd / Ld reviewed with patient based on 1.0 gram per kg of IBW patient needs  grams of protein total daily.       Subjective:                   Current MNT:       Bariatric soft diet with MVI, Calcium & Protein Supplements     MNT Advanced to:     Bariatric soft diet introducing raw fruit, raw vegetables, rice, protein bars, multivitamin, calcium, protein supplements      Allergies and Food Allergies and Food Intolerances:  [employer] protein chips not tolerated     Nutritional Data    No - Nausea  No - Vomiting    [facility] Bowel Protocol:  Patient states he / she has the following bowel movements per week 7  no - Diarrhea  No - Steatorrhea  No - Constipation  When was your last bowel movement yesterday  How much plain water are you drinking daily 64 oz   No - Are you taking Colace daily  What amount of Colace are you taking daily none  Yes - Are you taking Sugar Free Chewable Fiber Gummies  What amount of Sugar Free Chewable Fiber Gummies are you taking daily 2  No Patient was provided today the Constipation Handout  Yes Rd Ld reinforced pt needs to consume the following - Water Intake should be 64 oz, Fiber Chews prn, Dietary Fiber Intake 25g        No - Hair loss   No - Weight regain       No - Acid Reflux  No - Dumping Syndrome      No - Food gets stuck  Yes - Are you eating solids - should not be eating solids until 6 weeks post-op. no - Night Time Coughing  not applicable -  ARAVIND Aguirre Noted  Yes - Are you chewing thoroughly  - Does not take effect until 6 weeks post-op  No - Are you hungry after eating     How many meals a day 4-6 / Portions Sizes of Meals are 4 oz         3/10/22 Labs:   Glucose 107H, ALT 47H, B-12 1069H, Prealbumin 17L    Supplements: Bariatric Advantage Multi-Vitamin 1 tablet 2 times Daily, Bariatric Advantage 29 mgs Iron 1 tablet Daily, Bariatric Advanatge Calcium Lozenges 1 tablet 3 times Daily , Dry Vitamin D3 5,000iu every day    Estimated Daily Nutritional Needs: Based on Bariatric procedure for Wt. Loss  Energy: Will be calculated at Maintenance Stage    Protein: 60 - 80 gms Daily    MNT Plan and Additional Education: RD/LD reviewed Bariatric Soft Diet incorporating in Raw Fruits, Raw Vegetables, Red Meat, and Rice. Encouraged pt to meet protein and fluid needs daily. Handouts given. Pt. verbalized understanding. Pt instructed to call with questions. MEDICAL NUTRITION THERAPY (MNT) - Nutrition Care Plan   (The patient has been educated and given written education material that reinforces the following dietary guidelines for Bariatric Surgery)  Goal:  Patient able to verbalize the following dietary concepts:  3 to 4 ounce portions per meal     6 small meals daily      60 to 80 grams of protein   48 to 64 ounces of fluid daily (water)     Always consume protein item first with all meals   Pt is aware wt loss is pt controlled.    Slow Meals - 30-35 chews per bite / Meals 30 - 45 minutes long            The RD / LD reviewed with the patient that the dietary goals of the bariatric patient is to ensure that patient is able to meet established nutritional needs for a Bariatric Surgery  Patient at this time in order to promote healing, prevent significant weight changes, prevent skin breakdown and abnormal lab values, prevent complications, and tolerance to diet and texture of foods. Pt is able to identify proper food choices and needed changes and is able to explain proper food preparation. RD / LD reviewed compliance with assigned diet stages, volume of food and drink consumed, timing of meals, amount of protein and energy intake, daily vitamin and mineral supplementation, identify food cravings and any adverse reactions associated with intake of food and drink. RD / LD uses behavioral tools such as goal setting, MI, and self-monitoring, to reinforce the anatomic and physiologic effects of the surgery, so that the described behaviors become more of a habit not simply a reaction to the altered anatomy. Care Plan:   · Rd/Ld Addressed Food Records or 24-Hour Recall  · Rd / Ld encouraged patient to exercise at least 30 minutes daily  · Rd / Ld instructed patient on how to increase oral protein intake within the diet. Pt. can verbalize he / she will need to consume 60 - 80 grams. · Rd / Ld instructed the patient on how to increase the use of protein supplements within the diet. · Pt. was instructed on how to increase water intake. Patient will need to consume 48 - 64 oz. of just plain water in addition to 30 oz. of non-caloric beverages. · Handouts given to patient  · RD / LD encouraged oral intake    · RD / LD encouraged pt. to keep food records.       · Pt. is able to verbalize diet concepts      No - Constipation Handout Given and Reviewed    No - High Fiber Handout Given and Reviewed      No - Ulcer Handout Given and Reviewed          No - Pt. was instructed to continue current MNT   Yes - Pt. diet was advanced to the following stage ( See above)   No - Supplements: initiated (See list below  - Pt. given instruction)     No - Supplemental foods:______________________  No - Pt. was placed on a altered meal schedule    Good - Dietary Compliance

## 2022-03-25 NOTE — PROGRESS NOTES
Patient is 3 mo LRYGB. Water intake is around 64 oz daily. Protein through shakes and foods. Vitamin intake is good. Bowel movements are good. Scheduled with Connie Yu for dietary.

## 2022-03-25 NOTE — PROGRESS NOTES
Junior Langford Devan  3/25/2022  Laparoscopic Brittany-en- Y Gastric Bypass   3 months Post-Operative Follow-up. Subjective:   Brock Richmond is a 39 y.o. male three months post Laparoscopic Brittany-en-Y Gastric Bypass. He reports no issues. Reports no problems with eating, bowel movements, voiding, or the wounds. He is not having swallowing difficulty, is compliant most of the time with the multivitamins and calcium + Vit D. He is meeting fluid recommendations of at least 64 ounces per day and is meeting protein recommendations. Exercise: walking 60 minutes/day- 6 days/week. Weight=(!) 346 lb (156.9 kg)  Today's weight represents a total weight loss to date of 69 pounds. Prior to Admission medications    Medication Sig Start Date End Date Taking?  Authorizing Provider   Calcium Carbonate (CALCI-CHEW PO) Take 1 tablet by mouth 3 times daily   Yes Historical Provider, MD   Ferrous Sulfate (IRON PO) Take 1 tablet by mouth daily   Yes Historical Provider, MD   Multiple Vitamin (MVI, BARIATRIC ADVANTAGE MULTI-FORMULA, CHEW TAB) Take 1 tablet by mouth 2 times daily   Yes Historical Provider, MD   vitamin D (CHOLECALCIFEROL) 125 MCG (5000 UT) CAPS capsule Take 5,000 Units by mouth daily   Yes Historical Provider, MD   omeprazole (PRILOSEC) 20 MG delayed release capsule Take 1 capsule by mouth daily 12/3/21  Yes Fatuma Hendrickson MD         Allergies   Allergen Reactions    Adhesive Tape     Lactose Intolerance (Gi) Diarrhea     Pt avoids excessive milk or ice cream      Past Medical History:   Diagnosis Date    Irritable bowel syndrome     Morbid (severe) obesity due to excess calories ([address]     Obesity     Prehypertension     Tobacco use     quit 10/2020       Past Surgical History:   Procedure Laterality Date    COLONOSCOPY  2009    normal    BRITTANY-EN-Y GASTRIC BYPASS N/A 12/14/2021    GASTRIC BYPASS BRITTANY-EN-Y LAPAROSCOPIC ROBOTIC XI performed by Fatuma Hendrickson MD at [facility] N/A 2021    EGD BIOPSY performed by Essence June MD at [facility]       Current Outpatient Medications   Medication Sig Dispense Refill    Calcium Carbonate (CALCI-CHEW PO) Take 1 tablet by mouth 3 times daily      Ferrous Sulfate (IRON PO) Take 1 tablet by mouth daily      Multiple Vitamin (MVI, BARIATRIC ADVANTAGE MULTI-FORMULA, CHEW TAB) Take 1 tablet by mouth 2 times daily      vitamin D (CHOLECALCIFEROL) 125 MCG (5000 UT) CAPS capsule Take 5,000 Units by mouth daily      omeprazole (PRILOSEC) 20 MG delayed release capsule Take 1 capsule by mouth daily 30 capsule 12     No current facility-administered medications for this visit. Allergies   Allergen Reactions    Adhesive Tape     Lactose Intolerance (Gi) Diarrhea     Pt avoids excessive milk or ice cream        Family History   Problem Relation Age of Onset    Diabetes Mother     High Blood Pressure Mother     Diabetes Father     Other Father         aneurysm    Other Sister         hx gastric bypass    Diabetes Sister        Social History     Socioeconomic History    Marital status:      Spouse name: Not on file    Number of children: Not on file    Years of education: Not on file    Highest education level: Not on file   Occupational History    Not on file   Tobacco Use    Smoking status: Former Smoker     Packs/day: 0.50     Years: 12.00     Pack years: 6.00     Types: Cigarettes     Quit date: 2020     Years since quittin.4    Smokeless tobacco: Never Used   Vaping Use    Vaping Use: Former    Substances: Nicotine, THC (last use a few weeks ago)   Substance and Sexual Activity    Alcohol use: Not Currently    Drug use: Never    Sexual activity: Yes     Partners: Female   Other Topics Concern    Not on file   Social History Narrative    Drinks 2-3 cups of coffee daily.       Social Determinants of Health     Financial Resource Strain:     Difficulty of Paying Living Expenses: Not on file   Food Insecurity:     Worried About Running Out of Food in the Last Year: Not on file    Moy of Food in the Last Year: Not on file   Transportation Needs:     Lack of Transportation (Medical): Not on file    Lack of Transportation (Non-Medical): Not on file   Physical Activity:     Days of Exercise per Week: Not on file    Minutes of Exercise per Session: Not on file   Stress:     Feeling of Stress : Not on file   Social Connections:     Frequency of Communication with Friends and Family: Not on file    Frequency of Social Gatherings with Friends and Family: Not on file    Attends [employer] Services: Not on file    Active Member of [address] [place] [address] [employer] or Organizations: Not on file    Attends Club or Organization Meetings: Not on file    Marital Status: Not on file   Intimate Partner Violence:     Fear of Current or Ex-Partner: Not on file    Emotionally Abused: Not on file    Physically Abused: Not on file    Sexually Abused: Not on file   Housing Stability:     Unable to Pay for Housing in the Last Year: Not on file    Number of [place] in the Last Year: Not on file    Unstable Housing in the Last Year: Not on file           A complete 10 system review was performed and are otherwise negative unless mentioned in the above HPI. Specific negatives are listed below but may not include all those reviewed.     General ROS: negative obtundation, AMS  ENT ROS: negative rhinorrhea, epistaxis  Allergy and Immunology ROS: negative itchy/watery eyes or nasal congestion  Hematological and Lymphatic ROS: negative spontaneous bleeding or bruising  Endocrine ROS: negative  lethargy, mood swings, palpitations or polydipsia/polyuria  Respiratory ROS: negative sputum changes, stridor, tachypnea or wheezing  Cardiovascular ROS: negative for - loss of consciousness, murmur or orthopnea  Gastrointestinal ROS: negative for - hematochezia or hematemesis  Genito-Urinary ROS: negative for -  genital discharge or hematuria  Musculoskeletal ROS: negative for - focal weakness, gangrene  Psych/Neuro ROS: negative for - visual or auditory hallucinations, suicidal ideation    Physical exam:   BP (!) 151/93 (Site: Right Lower Arm, Position: Sitting, Cuff Size: Large Adult)   Pulse 76   Temp 97.3 °F (36.3 °C) (Temporal)   Resp 20   Ht 6' 2\" (1.88 m)   Wt (!) 346 lb (156.9 kg)   BMI 44.42 kg/m²   General appearance: AAO, NAD  Eyes: PERRL, EOMI, red conjunctiva  Lungs: Equal chest rise bilateral  Chest wall: atraumatic, no tenderness, no echymosis or abrasions  Heart: reg rate, no murmur  Abdomen: soft, nondistended, tender minimal, no hernias, no peritioneal signs  Extremities: full ROM all 4 ext, no gross motor or sensory deficits  Pulses: 2+ distal  Skin: warm and dry  Neurologic: spontanous eye opening, purposeful, follows complex commands  Psych: No tremor, no suicidal ideation, no hallucinations      Assessment: He is 3 months post Laparoscopic Devika-en- Y Gastric Bypass. Plan:  Doing well. Continue eating a soft, high protein, low calorie diet. Eat small portions very slowly and chew well before swallowing. Drink plenty of water,  maintain adequate variety and balance. Try to exercise more, at least 30 minutes per day, 7 days per week. Follow up in 3 months and contact me if questions arise in the meantime. Start using fiber therapy such as Metamucil twice a day to prevent constipation. Bowels must move every day.       Physician Signature: Electronically signed by Dr. Jeff Villalpando MD

## 2022-03-28 LAB — VITAMIN B1 WHOLE BLOOD: 209 NMOL/L (ref 70–180)

## 2022-06-17 ENCOUNTER — TELEPHONE (OUTPATIENT)
Dept: BARIATRICS/WEIGHT MGMT | Age: 42
End: 2022-06-17

## 2022-06-17 ENCOUNTER — OFFICE VISIT (OUTPATIENT)
Dept: BARIATRICS/WEIGHT MGMT | Age: 42
End: 2022-06-17
Payer: COMMERCIAL

## 2022-06-17 ENCOUNTER — INITIAL CONSULT (OUTPATIENT)
Dept: BARIATRICS/WEIGHT MGMT | Age: 42
End: 2022-06-17

## 2022-06-17 VITALS
TEMPERATURE: 98.1 F | DIASTOLIC BLOOD PRESSURE: 89 MMHG | HEIGHT: 74 IN | BODY MASS INDEX: 38.63 KG/M2 | WEIGHT: 301 LBS | SYSTOLIC BLOOD PRESSURE: 165 MMHG | HEART RATE: 70 BPM | RESPIRATION RATE: 20 BRPM

## 2022-06-17 VITALS — HEIGHT: 74 IN | WEIGHT: 301 LBS | BODY MASS INDEX: 38.63 KG/M2

## 2022-06-17 DIAGNOSIS — Z71.3 DIETARY COUNSELING: Primary | ICD-10-CM

## 2022-06-17 DIAGNOSIS — K91.2 MALNUTRITION FOLLOWING GASTROINTESTINAL SURGERY: Primary | ICD-10-CM

## 2022-06-17 DIAGNOSIS — R10.13 EPIGASTRIC PAIN: ICD-10-CM

## 2022-06-17 PROCEDURE — 99999 PR OFFICE/OUTPT VISIT,PROCEDURE ONLY: CPT

## 2022-06-17 PROCEDURE — 99212 OFFICE O/P EST SF 10 MIN: CPT

## 2022-06-17 PROCEDURE — 99213 OFFICE O/P EST LOW 20 MIN: CPT | Performed by: SURGERY

## 2022-06-17 NOTE — PROGRESS NOTES
Baldev Darius Hartman  6/17/2022  Laparoscopic Brittany-en- Y Gastric Bypass  6 months Post-Operative Follow-up. Subjective:   Obdulio Bailey is a 39 y.o. male six months post Laparoscopic Brittany-en-Y Gastric Bypass. Doing well  He is not having swallowing difficulty, is compliant most of the time with the multivitamins and calcium + Vit D. He is meeting fluid recommendations of at least 64 ounces per day and is meeting protein recommendations. Exercise: walking 30 minutes/day- 3 days/week. Strength training. SOme intermittent epigastric pains. Weight=(!) 301 lb (136.5 kg)  Today's weight represents a weight loss to date of 114 pounds. Allergies: Adhesive tape and Lactose intolerance (gi)   Prior to Admission medications    Medication Sig Start Date End Date Taking?  Authorizing Provider   Calcium Carbonate (CALCI-CHEW PO) Take 1 tablet by mouth 3 times daily   Yes Historical Provider, MD   Ferrous Sulfate (IRON PO) Take 1 tablet by mouth daily   Yes Historical Provider, MD   Multiple Vitamin (MVI, BARIATRIC ADVANTAGE MULTI-FORMULA, CHEW TAB) Take 1 tablet by mouth 2 times daily   Yes Historical Provider, MD   vitamin D (CHOLECALCIFEROL) 125 MCG (5000 UT) CAPS capsule Take 5,000 Units by mouth daily   Yes Historical Provider, MD   omeprazole (PRILOSEC) 20 MG delayed release capsule Take 1 capsule by mouth daily 12/3/21  Yes Betzy Foss MD           Past Medical History:   Diagnosis Date    Irritable bowel syndrome     Morbid (severe) obesity due to excess calories ([address]     Obesity     Prehypertension     Tobacco use     quit 10/2020       Past Surgical History:   Procedure Laterality Date    COLONOSCOPY  2009    normal    BRITTANY-EN-Y GASTRIC BYPASS N/A 12/14/2021    GASTRIC BYPASS BRITTANY-EN-Y LAPAROSCOPIC ROBOTIC XI performed by Betzy Foss MD at [address] [facility] 8/24/2021    EGD BIOPSY performed by Betzy Foss MD at [address] [facility] Medications   Medication Sig Dispense Refill    Calcium Carbonate (CALCI-CHEW PO) Take 1 tablet by mouth 3 times daily      Ferrous Sulfate (IRON PO) Take 1 tablet by mouth daily      Multiple Vitamin (MVI, BARIATRIC ADVANTAGE MULTI-FORMULA, CHEW TAB) Take 1 tablet by mouth 2 times daily      vitamin D (CHOLECALCIFEROL) 125 MCG (5000 UT) CAPS capsule Take 5,000 Units by mouth daily      omeprazole (PRILOSEC) 20 MG delayed release capsule Take 1 capsule by mouth daily 30 capsule 12     No current facility-administered medications for this visit. Allergies   Allergen Reactions    Adhesive Tape     Lactose Intolerance (Gi) Diarrhea     Pt avoids excessive milk or ice cream        Family History   Problem Relation Age of Onset    Diabetes Mother     High Blood Pressure Mother     Diabetes Father     Other Father         aneurysm    Other Sister         hx gastric bypass    Diabetes Sister        Social History     Socioeconomic History    Marital status:      Spouse name: Not on file    Number of children: Not on file    Years of education: Not on file    Highest education level: Not on file   Occupational History    Not on file   Tobacco Use    Smoking status: Former Smoker     Packs/day: 0.50     Years: 12.00     Pack years: 6.00     Types: Cigarettes     Quit date: 2020     Years since quittin.7    Smokeless tobacco: Never Used   Vaping Use    Vaping Use: Former    Substances: Nicotine, THC (last use a few weeks ago)   Substance and Sexual Activity    Alcohol use: Not Currently    Drug use: Never    Sexual activity: Yes     Partners: Female   Other Topics Concern    Not on file   Social History Narrative    Drinks 2-3 cups of coffee daily.       Social Determinants of Health     Financial Resource Strain:     Difficulty of Paying Living Expenses: Not on file   Food Insecurity:     Worried About Running Out of Food in the Last Year: Not on file    Moy of Food in the Last Year: Not on file   Transportation Needs:     Lack of Transportation (Medical): Not on file    Lack of Transportation (Non-Medical): Not on file   Physical Activity:     Days of Exercise per Week: Not on file    Minutes of Exercise per Session: Not on file   Stress:     Feeling of Stress : Not on file   Social Connections:     Frequency of Communication with Friends and Family: Not on file    Frequency of Social Gatherings with Friends and Family: Not on file    Attends [employer] Services: Not on file    Active Member of [address] [place] [address] [employer] or Organizations: Not on file    Attends Club or Organization Meetings: Not on file    Marital Status: Not on file   Intimate Partner Violence:     Fear of Current or Ex-Partner: Not on file    Emotionally Abused: Not on file    Physically Abused: Not on file    Sexually Abused: Not on file   Housing Stability:     Unable to Pay for Housing in the Last Year: Not on file    Number of [place] in the Last Year: Not on file    Unstable Housing in the Last Year: Not on file           A complete 10 system review was performed and are otherwise negative unless mentioned in the above HPI. Specific negatives are listed below but may not include all those reviewed.     General ROS: negative obtundation, AMS  ENT ROS: negative rhinorrhea, epistaxis  Allergy and Immunology ROS: negative itchy/watery eyes or nasal congestion  Hematological and Lymphatic ROS: negative spontaneous bleeding or bruising  Endocrine ROS: negative  lethargy, mood swings, palpitations or polydipsia/polyuria  Respiratory ROS: negative sputum changes, stridor, tachypnea or wheezing  Cardiovascular ROS: negative for - loss of consciousness, murmur or orthopnea  Gastrointestinal ROS: negative for - hematochezia or hematemesis  Genito-Urinary ROS: negative for -  genital discharge or hematuria  Musculoskeletal ROS: negative for - focal weakness, gangrene  Psych/Neuro ROS: negative for - visual or auditory hallucinations, suicidal ideation    Physical exam:   BP (!) 165/89 (Site: Left Lower Arm, Position: Sitting, Cuff Size: Medium Adult)   Pulse 70   Temp 98.1 °F (36.7 °C) (Temporal)   Resp 20   Ht 6' 2\" (1.88 m)   Wt (!) 301 lb (136.5 kg)   BMI 38.65 kg/m²   General appearance: alert, appears stated age and cooperative  Head: Normocephalic, without obvious abnormality, atraumatic  Neck: no adenopathy, supple, symmetrical, trachea midline   Lungs: clear to auscultation bilaterally  Heart: regular rate and rhythm  Abdomen: soft, non-tender; bowel sounds normal; no masses,  no organomegaly  Extremities: extremities normal, atraumatic, no cyanosis or edema    Assessment:  6 months post Laparoscopic Devika-en- Y Gastric Bypass. He does not complain of GERD,  does not have sleep apnea,  does not have diabetes,  does not have hypertension off medical treatment. Cholesterol and triglycerides not checked this visit. Epigastric bloating on occasion. Plan:  Doing well. Continue to eat small portions very slowly and chew well before swallowing. High protein, low calorie diet. Drink plenty of water. Try to exercise more, maintain adequate variety and balance, pre-plan meals and maintain daily food and activity record. Follow up in 6 months. Always call the clinic if you have any medical problems. Stop the Omeprazole or other acid reducing medicine if possible. If ulcer pain or acid reflux symptoms start, restart the Omeprazole and call the clinic.     Check RUQ US eval stones      Physician Signature: Electronically signed by Dr. Chavez Barker MD

## 2022-06-17 NOTE — PATIENT INSTRUCTIONS
Please continue to take your vitamin and mineral supplements as instructed. If you received a blood work prescription today for laboratory monitoring due prior to your next routine follow-up visit, please have this blood work obtained 10 to 14 days prior to your next visit. It is important to fast for 12 hours prior to routine weight loss surgery blood work, EXCEPT for drinking water, to ensure accuracy of results. Please report nausea, vomiting, abdominal pain, or any other problems you experience to your surgeon. For problems related to weight loss surgery, it is best to go to [address] [place] [address] Emergency Department and have your surgeon paged.

## 2022-06-17 NOTE — PROGRESS NOTES
Medical Nutrition Therapy (MNT) Assessment     Pt. Name: Onesimo Perry   Date:6/17/2022  F/U Appt: Sx Type 6 mo rygb    Food Records Kept: Yes  24Hour Recall Completed at Office:No    Ht 6' 2\" (1.88 m)   Wt (!) 301 lb (136.5 kg)   BMI 38.65 kg/m²  Height: 6' 2\" (1.88 m) Weight: (!) 301 lb (136.5 kg)   IBW:ideal body weight   197 lbs % EBWL: 52 %     Wt Readings from Last 3 Encounters:   06/17/22 (!) 301 lb (136.5 kg)   06/17/22 (!) 301 lb (136.5 kg)   03/25/22 (!) 346 lb (156.9 kg)                     Protein supplements: Pt. is currently using the following protein supplement Bariatric Fusion and consuming the following grams of protein 90 plus. Rd / Ld reviewed with patient based on 1.0 gram per kg of IBW patient needs  grams of protein total daily.       Subjective:                   Current MNT:       Bariatric soft diet introducing raw fruit, raw vegetables, rice, protein bars, multivitamin, calcium, protein supplements     MNT Advanced to:     Bariatric soft diet introducing raw fruit, raw vegetables, rice, protein bars, multivitamin, calcium, protein supplements      Allergies and Food Allergies and Food Intolerances:  Lactose intolerance, corn not tolerated    Nutritional Data  No - Nausea  No - Vomiting    SWLC Bowel Protocol:  Patient states he / she has the following bowel movements per week 7  no - Diarrhea  No - Steatorrhea  No - Constipation  When was your last bowel movement yesterday  How much plain water are you drinking daily 64 oz   What other beverages / fluids are you drinking daily and the amount protein shakes  No - Are you taking Colace daily  What amount of Colace are you taking daily none  Yes - Are you taking Sugar Free Chewable Fiber Gummies  What amount of Sugar Free Chewable Fiber Gummies are you taking daily 2    No Patient was provided today the Constipation Handout  Yes Rd Ld reinforced pt needs to consume the following - Water Intake should be 64 oz , Fiber Chews prn, Dietary Fiber Intake 25 g        No - Hair loss   No - Weight regain       No - Acid Reflux  No - Dumping Syndrome      No - Food gets stuck  Yes - Are you eating solids - should not be eating solids until 6 weeks post-op. no - Night Time Coughing  not applicable -  B Ping Noted  Yes - Are you chewing thoroughly  - Does not take effect until 6 weeks post-op  No - Are you hungry after eating     How many meals a 4-6 day  / Portions Sizes of Meals are 3-4 oz          Labs: none drawn    Supplements: Bariatric Advantage Multi-Vitamin 1 tablet 2 times Daily, Bariatric Advantage 29 mgs Iron 1 tablet Daily, Bariatric Advanatge Calcium Lozenges 1 tablet 3 times Daily , Dry Vitamin D3 5,000iu every day    Estimated Daily Nutritional Needs: Based on Bariatric procedure for Wt. Loss  Energy: Will be calculated at Maintenance Stage    Protein: 60 - 80 gms Daily    MNT Plan and Additional Education: RD/LD reviewed Bariatric Soft Diet incorporating in Raw Fruits, Raw Vegetables, Red Meat, and Rice. Encouraged pt to meet protein and fluid needs daily. Handouts given. Pt. verbalized understanding. Pt instructed to call with questions. MEDICAL NUTRITION THERAPY (MNT) - Nutrition Care Plan   (The patient has been educated and given written education material that reinforces the following dietary guidelines for Bariatric Surgery)  Goal:  Patient able to verbalize the following dietary concepts:  3 to 4 ounce portions per meal     6 small meals daily      60 to 80 grams of protein   48 to 64 ounces of fluid daily (water)     Always consume protein item first with all meals   Pt is aware wt loss is pt controlled.    Slow Meals - 30-35 chews per bite / Meals 30 - 45 minutes long            The RD / LD reviewed with the patient that the dietary goals of the bariatric patient is to ensure that patient is able to meet established nutritional needs for a Bariatric Surgery  Patient at this time in order to promote healing, prevent significant weight changes, prevent skin breakdown and abnormal lab values, prevent complications, and tolerance to diet and texture of foods. Pt is able to identify proper food choices and needed changes and is able to explain proper food preparation. RD / LD reviewed compliance with assigned diet stages, volume of food and drink consumed, timing of meals, amount of protein and energy intake, daily vitamin and mineral supplementation, identify food cravings and any adverse reactions associated with intake of food and drink. RD / LD uses behavioral tools such as goal setting, MI, and self-monitoring, to reinforce the anatomic and physiologic effects of the surgery, so that the described behaviors become more of a habit not simply a reaction to the altered anatomy. Care Plan:   · Rd/Ld Addressed Food Records or 24-Hour Recall  · Rd / Ld encouraged patient to exercise at least 30 minutes daily  · Rd / Ld instructed patient on how to increase oral protein intake within the diet. Pt. can verbalize he / she will need to consume 60 - 80 grams. · Rd / Ld instructed the patient on how to increase the use of protein supplements within the diet. · Pt. was instructed on how to increase water intake. Patient will need to consume 48 - 64 oz. of just plain water in addition to 30 oz. of non-caloric beverages. · Handouts given to patient  · RD / LD encouraged oral intake    · RD / LD encouraged pt. to keep food records.       · Pt. is able to verbalize diet concepts      No - Constipation Handout Given and Reviewed    No - High Fiber Handout Given and Reviewed      No - Ulcer Handout Given and Reviewed          No - Pt. was instructed to continue current MNT   Yes - Pt. diet was advanced to the following stage ( See above)   No - Supplements: initiated (See list below  - Pt. given instruction)     No - Supplemental foods:______________________  No - Pt. was placed on a altered meal schedule    Good - Dietary Compliance

## 2022-11-19 ENCOUNTER — HOSPITAL ENCOUNTER (OUTPATIENT)
Age: 42
Discharge: HOME | End: 2022-11-19
Payer: COMMERCIAL

## 2022-11-19 ENCOUNTER — HOSPITAL ENCOUNTER (OUTPATIENT)
Dept: ULTRASOUND IMAGING | Age: 42
Discharge: HOME | End: 2022-11-21
Payer: COMMERCIAL

## 2022-11-19 DIAGNOSIS — K91.2 MALNUTRITION FOLLOWING GASTROINTESTINAL SURGERY: ICD-10-CM

## 2022-11-19 LAB
ALBUMIN SERPL-MCNC: 4.3 G/DL (ref 3.5–5.2)
ALP BLD-CCNC: 83 U/L (ref 40–129)
ALT SERPL-CCNC: 12 U/L (ref 0–40)
ANION GAP SERPL CALCULATED.3IONS-SCNC: 9 MMOL/L (ref 7–16)
AST SERPL-CCNC: 16 U/L (ref 0–39)
BILIRUB SERPL-MCNC: 1.4 MG/DL (ref 0–1.2)
BUN BLDV-MCNC: 9 MG/DL (ref 6–20)
CALCIUM SERPL-MCNC: 9.8 MG/DL (ref 8.6–10.2)
CHLORIDE BLD-SCNC: 102 MMOL/L (ref 98–107)
CHOLESTEROL, TOTAL: 104 MG/DL (ref 0–199)
CO2: 28 MMOL/L (ref 22–29)
CREAT SERPL-MCNC: 0.7 MG/DL (ref 0.7–1.2)
FERRITIN: 130 NG/ML
FOLATE: 12.8 NG/ML (ref 4.8–24.2)
GFR SERPL CREATININE-BSD FRML MDRD: >60 ML/MIN/1.73
GLUCOSE BLD-MCNC: 84 MG/DL (ref 74–99)
HCT VFR BLD CALC: 46.3 % (ref 37–54)
HEMOGLOBIN: 15.5 G/DL (ref 12.5–16.5)
MCH RBC QN AUTO: 30.9 PG (ref 26–35)
MCHC RBC AUTO-ENTMCNC: 33.5 % (ref 32–34.5)
MCV RBC AUTO: 92.4 FL (ref 80–99.9)
PDW BLD-RTO: 13 FL (ref 11.5–15)
PLATELET # BLD: 199 E9/L (ref 130–450)
PMV BLD AUTO: 11.5 FL (ref 7–12)
POTASSIUM SERPL-SCNC: 4.1 MMOL/L (ref 3.5–5)
RBC # BLD: 5.01 E12/L (ref 3.8–5.8)
SODIUM BLD-SCNC: 139 MMOL/L (ref 132–146)
TOTAL PROTEIN: 6.8 G/DL (ref 6.4–8.3)
TRIGL SERPL-MCNC: 62 MG/DL (ref 0–149)
VITAMIN B-12: 412 PG/ML (ref 211–946)
VITAMIN D 25-HYDROXY: 50 NG/ML (ref 30–100)
WBC # BLD: 4.7 E9/L (ref 4.5–11.5)

## 2022-11-19 PROCEDURE — 84630 ASSAY OF ZINC: CPT

## 2022-11-19 PROCEDURE — 82465 ASSAY BLD/SERUM CHOLESTEROL: CPT

## 2022-11-19 PROCEDURE — 82607 VITAMIN B-12: CPT

## 2022-11-19 PROCEDURE — 84478 ASSAY OF TRIGLYCERIDES: CPT

## 2022-11-19 PROCEDURE — 76705 ECHO EXAM OF ABDOMEN: CPT

## 2022-11-19 PROCEDURE — 36415 COLL VENOUS BLD VENIPUNCTURE: CPT

## 2022-11-19 PROCEDURE — 82728 ASSAY OF FERRITIN: CPT

## 2022-11-19 PROCEDURE — 84425 ASSAY OF VITAMIN B-1: CPT

## 2022-11-19 PROCEDURE — 84134 ASSAY OF PREALBUMIN: CPT

## 2022-11-19 PROCEDURE — 85027 COMPLETE CBC AUTOMATED: CPT

## 2022-11-19 PROCEDURE — 82525 ASSAY OF COPPER: CPT

## 2022-11-19 PROCEDURE — 80053 COMPREHEN METABOLIC PANEL: CPT

## 2022-11-19 PROCEDURE — 82306 VITAMIN D 25 HYDROXY: CPT

## 2022-11-19 PROCEDURE — 84255 ASSAY OF SELENIUM: CPT

## 2022-11-19 PROCEDURE — 82746 ASSAY OF FOLIC ACID SERUM: CPT

## 2022-11-20 LAB — PREALBUMIN: 20 MG/DL (ref 20–40)

## 2022-11-22 LAB
COPPER: 94 UG/DL (ref 70–140)
SELENIUM: 105.3 UG/L (ref 23–190)
ZINC: 70.4 UG/DL (ref 60–120)

## 2022-11-26 LAB — VITAMIN B1 WHOLE BLOOD: 162 NMOL/L (ref 70–180)

## 2022-11-28 ENCOUNTER — TELEPHONE (OUTPATIENT)
Dept: SURGERY | Age: 42
End: 2022-11-28

## 2022-11-28 DIAGNOSIS — K80.20 SYMPTOMATIC CHOLELITHIASIS: Primary | ICD-10-CM

## 2022-11-28 NOTE — TELEPHONE ENCOUNTER
Called and discussed RUQ US results. Gallstones with now symptomatic RUQ and epigastric pain  Recommend lap zoey with IOC    Discussed risks of injury to liver, common bile duct, hepatic duct, surrounding vascular structures, small bowel, stomach. Risk for further surgery to correct complications.   Plan for laparoscopic, possible open cholecystectomy with possible intraoperative cholangiogram. Patient agrees and all questions answered to their and family's satisfaction      Laz Guido MD, MS  Minimally Invasive and Bariatric Surgery  [phone] (p)  11/28/2022  9:28 AM

## 2022-11-29 ENCOUNTER — TELEPHONE (OUTPATIENT)
Dept: BARIATRICS/WEIGHT MGMT | Age: 42
End: 2022-11-29

## 2022-11-29 ENCOUNTER — PREP FOR PROCEDURE (OUTPATIENT)
Dept: BARIATRICS/WEIGHT MGMT | Age: 42
End: 2022-11-29

## 2022-11-29 PROBLEM — K80.20 CHOLELITHIASIS: Status: ACTIVE | Noted: 2022-11-29

## 2022-11-29 PROBLEM — R10.13 ABDOMINAL PAIN, EPIGASTRIC: Status: ACTIVE | Noted: 2022-11-29

## 2022-11-29 NOTE — TELEPHONE ENCOUNTER
Prior Authorization Form  DEMOGRAPHICS:    Patient Name:  Nacho Simons  Patient :  1980            Insurance:  Payor: Shannon Baca / Plan: Shannon Baca / Product Type: *No Product type* /   Insurance ID Number:    Payer/Plan Subscr  Sex Relation Sub. Ins. ID Effective Group Num   1.  Sj Frey 1980 Male Self [id number] 20 [id number]                                   [id number]         DIAGNOSIS & PROCEDURE:    Procedure/Operation: laparoscopic, possible open cholecystectomy with possible intraoperative cholangiogram           CPT Code: 64114    Diagnosis:  symptomatic cholelithiasis, epigastric pain    ICD10 Code: k80.20, r10.13    Location:  [facility]    Surgeon:  Dr. Kay Najera INFORMATION:    Date: 22    Time: unknown              Anesthesia:  General                                                       Status:  Outpatient        Special Comments:  none       Electronically signed by Kayley Marcos RN on 2022 at 11:19 AM

## 2022-11-30 DIAGNOSIS — K21.9 GASTROESOPHAGEAL REFLUX DISEASE WITHOUT ESOPHAGITIS: ICD-10-CM

## 2022-11-30 RX ORDER — OMEPRAZOLE 20 MG/1
CAPSULE, DELAYED RELEASE ORAL
Qty: 30 CAPSULE | Refills: 12 | Status: SHIPPED | OUTPATIENT
Start: 2022-11-30

## 2022-12-05 ENCOUNTER — ANESTHESIA EVENT (OUTPATIENT)
Dept: OPERATING ROOM | Age: 42
End: 2022-12-05
Payer: COMMERCIAL

## 2022-12-05 RX ORDER — SODIUM CHLORIDE 9 MG/ML
INJECTION, SOLUTION INTRAVENOUS PRN
Status: CANCELLED | OUTPATIENT
Start: 2022-12-05

## 2022-12-05 RX ORDER — KETOROLAC TROMETHAMINE 30 MG/ML
30 INJECTION, SOLUTION INTRAMUSCULAR; INTRAVENOUS ONCE
Status: CANCELLED | OUTPATIENT
Start: 2022-12-05 | End: 2022-12-05

## 2022-12-05 RX ORDER — MIDAZOLAM HYDROCHLORIDE 1 MG/ML
2 INJECTION INTRAMUSCULAR; INTRAVENOUS
Status: CANCELLED | OUTPATIENT
Start: 2022-12-05 | End: 2022-12-06

## 2022-12-05 RX ORDER — IPRATROPIUM BROMIDE AND ALBUTEROL SULFATE 2.5; .5 MG/3ML; MG/3ML
1 SOLUTION RESPIRATORY (INHALATION)
Status: CANCELLED | OUTPATIENT
Start: 2022-12-05 | End: 2022-12-06

## 2022-12-05 RX ORDER — SODIUM CHLORIDE 0.9 % (FLUSH) 0.9 %
5-40 SYRINGE (ML) INJECTION PRN
Status: CANCELLED | OUTPATIENT
Start: 2022-12-05

## 2022-12-05 RX ORDER — SODIUM CHLORIDE 0.9 % (FLUSH) 0.9 %
5-40 SYRINGE (ML) INJECTION EVERY 12 HOURS SCHEDULED
Status: CANCELLED | OUTPATIENT
Start: 2022-12-05

## 2022-12-05 RX ORDER — PROCHLORPERAZINE EDISYLATE 5 MG/ML
5 INJECTION INTRAMUSCULAR; INTRAVENOUS
Status: CANCELLED | OUTPATIENT
Start: 2022-12-05 | End: 2022-12-06

## 2022-12-05 RX ORDER — LABETALOL HYDROCHLORIDE 5 MG/ML
10 INJECTION, SOLUTION INTRAVENOUS
Status: CANCELLED | OUTPATIENT
Start: 2022-12-05

## 2022-12-05 RX ORDER — DIPHENHYDRAMINE HYDROCHLORIDE 50 MG/ML
12.5 INJECTION INTRAMUSCULAR; INTRAVENOUS
Status: CANCELLED | OUTPATIENT
Start: 2022-12-05 | End: 2022-12-06

## 2022-12-05 RX ORDER — HYDRALAZINE HYDROCHLORIDE 20 MG/ML
10 INJECTION INTRAMUSCULAR; INTRAVENOUS
Status: CANCELLED | OUTPATIENT
Start: 2022-12-05

## 2022-12-05 RX ORDER — MEPERIDINE HYDROCHLORIDE 25 MG/ML
12.5 INJECTION INTRAMUSCULAR; INTRAVENOUS; SUBCUTANEOUS EVERY 5 MIN PRN
Status: CANCELLED | OUTPATIENT
Start: 2022-12-05

## 2022-12-05 RX ORDER — HALOPERIDOL 5 MG/ML
1 INJECTION INTRAMUSCULAR
Status: CANCELLED | OUTPATIENT
Start: 2022-12-05 | End: 2022-12-06

## 2022-12-05 RX ORDER — OXYCODONE HYDROCHLORIDE 5 MG/1
5 TABLET ORAL
Status: CANCELLED | OUTPATIENT
Start: 2022-12-05 | End: 2022-12-06

## 2022-12-05 NOTE — ANESTHESIA PRE PROCEDURE
Prehypertension R03.0    Low serum HDL R74.8    S/P gastric bypass Z98.84    Morbid obesity with BMI of 50.0-59.9, adult (HCC) E66.01, Z68.43    Cholelithiasis K80.20    Abdominal pain, epigastric R10.13       Past Medical History:        Diagnosis Date    Irritable bowel syndrome     Morbid (severe) obesity due to excess calories ([address]     Obesity     Prehypertension     Tobacco use     quit 10/2020       Past Surgical History:        Procedure Laterality Date    COLONOSCOPY  2009    normal    BRITTANY-EN-Y GASTRIC BYPASS N/A 2021    GASTRIC BYPASS BRITTANY-EN-Y LAPAROSCOPIC ROBOTIC XI performed by Jo Ann Davis MD at [facility] 2021    EGD BIOPSY performed by Jo Ann Davis MD at [address] History:    Social History     Tobacco Use    Smoking status: Former     Packs/day: 0.50     Years: 12.00     Pack years: 6.00     Types: Cigarettes     Quit date: 2020     Years since quittin.1    Smokeless tobacco: Never   Substance Use Topics    Alcohol use: Not Currently                                Counseling given: Not Answered      Vital Signs (Current): There were no vitals filed for this visit.                                            BP Readings from Last 3 Encounters:   22 (!) 165/89   22 (!) 151/93   22 134/87       NPO Status:                                                                                 BMI:   Wt Readings from Last 3 Encounters:   22 (!) 301 lb (136.5 kg)   22 (!) 301 lb (136.5 kg)   22 (!) 346 lb (156.9 kg)     There is no height or weight on file to calculate BMI.    CBC:   Lab Results   Component Value Date/Time    WBC 4.7 2022 11:20 AM    RBC 5.01 2022 11:20 AM    HGB 15.5 2022 11:20 AM    HCT 46.3 2022 11:20 AM    MCV 92.4 2022 11:20 AM    RDW 13.0 2022 11:20 AM     2022 11:20 AM       CMP:   Lab Results   Component (+) GERD:, morbid obesity ( Morbid obesity BMI: 38.65)         ROS comment: Irritable bowel syndrome. Endo/Other: Negative Endo/Other ROS                    Abdominal:   (+) obese (  Morbid obesity BMI: 38.65),           Vascular: negative vascular ROS. Other Findings:             Anesthesia Plan      general     ASA 3       Induction: intravenous. BIS  MIPS: Postoperative opioids intended and Prophylactic antiemetics administered. Anesthetic plan and risks discussed with patient. Plan discussed with CRNA and attending.     Attending anesthesiologist reviewed and agrees with Preprocedure content                Vilma Alfaro MD   12-8-22    CANELO Vo - CRNA

## 2022-12-07 NOTE — PROGRESS NOTES
[address] [facility]                                                                                                                    PRE OP INSTRUCTIONS FOR  Erick Murillo        Date: 12/7/2022    Date of surgery: 12/8/2022   Arrival Time: Hospital will call you between 5pm and 7pm with your final arrival time for surgery    Nothing by mouth (NPO) as instructed.____midnight ________________________________________________________________    Take the following medications with a small sip of water on the morning of Surgery:      Diabetics may take evening dose of insulin but none after midnight. If you feel symptomatic or low blood sugar morning of surgery drink 1-2 ounces of apple juice only. Aspirin, Ibuprofen, Advil, Naproxen, Vitamin E and other Anti-inflammatory products should be stopped  before surgery  as directed by your physician. Take Tylenol only unless instructed otherwise by your surgeon. Check with your Doctor regarding stopping Plavix, Coumadin, Lovenox, Eliquis, Effient, or other blood thinners. Do not smoke,use illicit drugs and do not drink any alcoholic beverages 24 hours prior to surgery. You may brush your teeth the morning of surgery. DO NOT SWALLOW WATER    You MUST make arrangements for a responsible adult to take you home after your surgery. You will not be allowed to leave alone or drive yourself home. It is strongly suggested someone stay with you the first 24 hrs. Your surgery will be cancelled if you do not have a ride home. PEDIATRIC PATIENTS ONLY:  A parent/legal guardian must accompany a child scheduled for surgery and plan to stay at the hospital until the child is discharged. Please do not bring other children with you. Please wear simple, loose fitting clothing to the hospital.  Yolanda Dense not bring valuables (money, credit cards, checkbooks, etc.) Do not wear any makeup (including no eye makeup) or nail polish on your fingers or toes.     DO NOT wear any jewelry or piercings on day of surgery. All body piercing jewelry must be removed. Shower the night before surgery with _x__Antibacterial soap /CRISTELA WIPES________    TOTAL JOINT REPLACEMENT/HYSTERECTOMY PATIENTS ONLY---Remember to bring Blood Bank bracelet to the hospital on the day of surgery. If you have a Living Will and Durable Power of  for Healthcare, please bring in a copy. If appropriate bring crutches, inspirex, WALKER, CANE etc... Notify your Surgeon if you develop any illness between now and surgery time, cough, cold, fever, sore throat, nausea, vomiting, etc.  Please notify your surgeon if you experience dizziness, shortness of breath or blurred vision between now & the time of your surgery. If you have ___dentures, they will be removed before going to the OR; we will provide you a container. If you wear ___contact lenses or ___glasses, they will be removed; please bring a case for them. To provide excellent care visitors will be limited to 2 in the room at any given time. Please bring picture ID and insurance card. During flu season no children under the age of 15 are permitted in the hospital for the safety of all patients. Other                  Please call AMBULATORY CARE if you have any further questions.    [address]     [address]

## 2022-12-08 ENCOUNTER — HOSPITAL ENCOUNTER (OUTPATIENT)
Age: 42
Setting detail: OUTPATIENT SURGERY
Discharge: HOME | End: 2022-12-08
Attending: SURGERY | Admitting: SURGERY
Payer: COMMERCIAL

## 2022-12-08 ENCOUNTER — HOSPITAL ENCOUNTER (OUTPATIENT)
Dept: GENERAL RADIOLOGY | Age: 42
Discharge: HOME | End: 2022-12-10
Payer: COMMERCIAL

## 2022-12-08 ENCOUNTER — ANESTHESIA (OUTPATIENT)
Dept: OPERATING ROOM | Age: 42
End: 2022-12-08
Payer: COMMERCIAL

## 2022-12-08 VITALS
HEART RATE: 83 BPM | SYSTOLIC BLOOD PRESSURE: 142 MMHG | BODY MASS INDEX: 32.85 KG/M2 | RESPIRATION RATE: 16 BRPM | TEMPERATURE: 97.6 F | DIASTOLIC BLOOD PRESSURE: 80 MMHG | WEIGHT: 256 LBS | HEIGHT: 74 IN | OXYGEN SATURATION: 97 %

## 2022-12-08 DIAGNOSIS — G89.18 POSTOPERATIVE PAIN: Primary | ICD-10-CM

## 2022-12-08 DIAGNOSIS — K82.9 GALLBLADDER DISEASE: ICD-10-CM

## 2022-12-08 DIAGNOSIS — K80.20 CHOLELITHIASIS: ICD-10-CM

## 2022-12-08 DIAGNOSIS — R10.13 ABDOMINAL PAIN, EPIGASTRIC: ICD-10-CM

## 2022-12-08 PROCEDURE — 6360000004 HC RX CONTRAST MEDICATION: Performed by: SURGERY

## 2022-12-08 PROCEDURE — 7100000000 HC PACU RECOVERY - FIRST 15 MIN: Performed by: SURGERY

## 2022-12-08 PROCEDURE — 6360000002 HC RX W HCPCS: Performed by: ANESTHESIOLOGY

## 2022-12-08 PROCEDURE — 74300 X-RAY BILE DUCTS/PANCREAS: CPT | Performed by: SURGERY

## 2022-12-08 PROCEDURE — 7100000010 HC PHASE II RECOVERY - FIRST 15 MIN: Performed by: SURGERY

## 2022-12-08 PROCEDURE — 3700000001 HC ADD 15 MINUTES (ANESTHESIA): Performed by: SURGERY

## 2022-12-08 PROCEDURE — C1894 INTRO/SHEATH, NON-LASER: HCPCS | Performed by: SURGERY

## 2022-12-08 PROCEDURE — 2500000003 HC RX 250 WO HCPCS

## 2022-12-08 PROCEDURE — 3700000000 HC ANESTHESIA ATTENDED CARE: Performed by: SURGERY

## 2022-12-08 PROCEDURE — 88304 TISSUE EXAM BY PATHOLOGIST: CPT

## 2022-12-08 PROCEDURE — 2709999900 HC NON-CHARGEABLE SUPPLY: Performed by: SURGERY

## 2022-12-08 PROCEDURE — 7100000011 HC PHASE II RECOVERY - ADDTL 15 MIN: Performed by: SURGERY

## 2022-12-08 PROCEDURE — 6360000002 HC RX W HCPCS

## 2022-12-08 PROCEDURE — 6360000002 HC RX W HCPCS: Performed by: SURGERY

## 2022-12-08 PROCEDURE — 3600000004 HC SURGERY LEVEL 4 BASE: Performed by: SURGERY

## 2022-12-08 PROCEDURE — 74300 X-RAY BILE DUCTS/PANCREAS: CPT

## 2022-12-08 PROCEDURE — 47563 LAPARO CHOLECYSTECTOMY/GRAPH: CPT | Performed by: SURGERY

## 2022-12-08 PROCEDURE — 7100000001 HC PACU RECOVERY - ADDTL 15 MIN: Performed by: SURGERY

## 2022-12-08 PROCEDURE — 2500000003 HC RX 250 WO HCPCS: Performed by: SURGERY

## 2022-12-08 PROCEDURE — 2580000003 HC RX 258: Performed by: SURGERY

## 2022-12-08 PROCEDURE — 3600000014 HC SURGERY LEVEL 4 ADDTL 15MIN: Performed by: SURGERY

## 2022-12-08 RX ORDER — NEOSTIGMINE METHYLSULFATE 1 MG/ML
INJECTION, SOLUTION INTRAVENOUS PRN
Status: DISCONTINUED | OUTPATIENT
Start: 2022-12-08 | End: 2022-12-08 | Stop reason: SDUPTHER

## 2022-12-08 RX ORDER — PROCHLORPERAZINE EDISYLATE 5 MG/ML
5 INJECTION INTRAMUSCULAR; INTRAVENOUS
Status: DISCONTINUED | OUTPATIENT
Start: 2022-12-08 | End: 2022-12-08 | Stop reason: HOSPADM

## 2022-12-08 RX ORDER — IPRATROPIUM BROMIDE AND ALBUTEROL SULFATE 2.5; .5 MG/3ML; MG/3ML
1 SOLUTION RESPIRATORY (INHALATION)
Status: DISCONTINUED | OUTPATIENT
Start: 2022-12-08 | End: 2022-12-08 | Stop reason: HOSPADM

## 2022-12-08 RX ORDER — ROCURONIUM BROMIDE 10 MG/ML
INJECTION, SOLUTION INTRAVENOUS PRN
Status: DISCONTINUED | OUTPATIENT
Start: 2022-12-08 | End: 2022-12-08 | Stop reason: SDUPTHER

## 2022-12-08 RX ORDER — DOCUSATE SODIUM 100 MG/1
100 CAPSULE, LIQUID FILLED ORAL 2 TIMES DAILY
Qty: 30 CAPSULE | Refills: 0 | Status: SHIPPED | OUTPATIENT
Start: 2022-12-08 | End: 2022-12-23

## 2022-12-08 RX ORDER — SODIUM CHLORIDE 9 MG/ML
INJECTION, SOLUTION INTRAVENOUS CONTINUOUS
Status: DISCONTINUED | OUTPATIENT
Start: 2022-12-08 | End: 2022-12-08 | Stop reason: HOSPADM

## 2022-12-08 RX ORDER — METHOCARBAMOL 100 MG/ML
1000 INJECTION, SOLUTION INTRAMUSCULAR; INTRAVENOUS ONCE
Status: COMPLETED | OUTPATIENT
Start: 2022-12-08 | End: 2022-12-08

## 2022-12-08 RX ORDER — FENTANYL CITRATE 50 UG/ML
INJECTION, SOLUTION INTRAMUSCULAR; INTRAVENOUS PRN
Status: DISCONTINUED | OUTPATIENT
Start: 2022-12-08 | End: 2022-12-08 | Stop reason: SDUPTHER

## 2022-12-08 RX ORDER — SODIUM CHLORIDE 9 MG/ML
INJECTION, SOLUTION INTRAVENOUS PRN
Status: DISCONTINUED | OUTPATIENT
Start: 2022-12-08 | End: 2022-12-08 | Stop reason: HOSPADM

## 2022-12-08 RX ORDER — SODIUM CHLORIDE 0.9 % (FLUSH) 0.9 %
5-40 SYRINGE (ML) INJECTION PRN
Status: DISCONTINUED | OUTPATIENT
Start: 2022-12-08 | End: 2022-12-08 | Stop reason: HOSPADM

## 2022-12-08 RX ORDER — METHOCARBAMOL 100 MG/ML
INJECTION, SOLUTION INTRAMUSCULAR; INTRAVENOUS
Status: COMPLETED
Start: 2022-12-08 | End: 2022-12-08

## 2022-12-08 RX ORDER — MIDAZOLAM HYDROCHLORIDE 1 MG/ML
2 INJECTION INTRAMUSCULAR; INTRAVENOUS
Status: DISCONTINUED | OUTPATIENT
Start: 2022-12-08 | End: 2022-12-08 | Stop reason: HOSPADM

## 2022-12-08 RX ORDER — KETOROLAC TROMETHAMINE 30 MG/ML
INJECTION, SOLUTION INTRAMUSCULAR; INTRAVENOUS PRN
Status: DISCONTINUED | OUTPATIENT
Start: 2022-12-08 | End: 2022-12-08 | Stop reason: SDUPTHER

## 2022-12-08 RX ORDER — HALOPERIDOL 5 MG/ML
1 INJECTION INTRAMUSCULAR
Status: DISCONTINUED | OUTPATIENT
Start: 2022-12-08 | End: 2022-12-08 | Stop reason: HOSPADM

## 2022-12-08 RX ORDER — ONDANSETRON 2 MG/ML
INJECTION INTRAMUSCULAR; INTRAVENOUS PRN
Status: DISCONTINUED | OUTPATIENT
Start: 2022-12-08 | End: 2022-12-08 | Stop reason: SDUPTHER

## 2022-12-08 RX ORDER — BUPIVACAINE HYDROCHLORIDE AND EPINEPHRINE 2.5; 5 MG/ML; UG/ML
INJECTION, SOLUTION EPIDURAL; INFILTRATION; INTRACAUDAL; PERINEURAL PRN
Status: DISCONTINUED | OUTPATIENT
Start: 2022-12-08 | End: 2022-12-08 | Stop reason: HOSPADM

## 2022-12-08 RX ORDER — SODIUM CHLORIDE 0.9 % (FLUSH) 0.9 %
5-40 SYRINGE (ML) INJECTION EVERY 12 HOURS SCHEDULED
Status: DISCONTINUED | OUTPATIENT
Start: 2022-12-08 | End: 2022-12-08 | Stop reason: HOSPADM

## 2022-12-08 RX ORDER — MIDAZOLAM HYDROCHLORIDE 1 MG/ML
INJECTION INTRAMUSCULAR; INTRAVENOUS PRN
Status: DISCONTINUED | OUTPATIENT
Start: 2022-12-08 | End: 2022-12-08 | Stop reason: SDUPTHER

## 2022-12-08 RX ORDER — DIPHENHYDRAMINE HYDROCHLORIDE 50 MG/ML
12.5 INJECTION INTRAMUSCULAR; INTRAVENOUS
Status: DISCONTINUED | OUTPATIENT
Start: 2022-12-08 | End: 2022-12-08 | Stop reason: HOSPADM

## 2022-12-08 RX ORDER — OXYCODONE HYDROCHLORIDE 5 MG/1
5 TABLET ORAL
Status: DISCONTINUED | OUTPATIENT
Start: 2022-12-08 | End: 2022-12-08 | Stop reason: HOSPADM

## 2022-12-08 RX ORDER — ONDANSETRON 4 MG/1
4 TABLET, ORALLY DISINTEGRATING ORAL EVERY 8 HOURS PRN
Qty: 20 TABLET | Refills: 1 | Status: SHIPPED | OUTPATIENT
Start: 2022-12-08

## 2022-12-08 RX ORDER — DEXAMETHASONE SODIUM PHOSPHATE 10 MG/ML
INJECTION, SOLUTION INTRAMUSCULAR; INTRAVENOUS PRN
Status: DISCONTINUED | OUTPATIENT
Start: 2022-12-08 | End: 2022-12-08 | Stop reason: SDUPTHER

## 2022-12-08 RX ORDER — KETOROLAC TROMETHAMINE 30 MG/ML
30 INJECTION, SOLUTION INTRAMUSCULAR; INTRAVENOUS ONCE
Status: DISCONTINUED | OUTPATIENT
Start: 2022-12-08 | End: 2022-12-08 | Stop reason: HOSPADM

## 2022-12-08 RX ORDER — GLYCOPYRROLATE 0.2 MG/ML
INJECTION INTRAMUSCULAR; INTRAVENOUS PRN
Status: DISCONTINUED | OUTPATIENT
Start: 2022-12-08 | End: 2022-12-08 | Stop reason: SDUPTHER

## 2022-12-08 RX ORDER — LABETALOL HYDROCHLORIDE 5 MG/ML
10 INJECTION, SOLUTION INTRAVENOUS
Status: DISCONTINUED | OUTPATIENT
Start: 2022-12-08 | End: 2022-12-08 | Stop reason: HOSPADM

## 2022-12-08 RX ORDER — LIDOCAINE HYDROCHLORIDE 20 MG/ML
INJECTION, SOLUTION INTRAVENOUS PRN
Status: DISCONTINUED | OUTPATIENT
Start: 2022-12-08 | End: 2022-12-08 | Stop reason: SDUPTHER

## 2022-12-08 RX ORDER — HYDRALAZINE HYDROCHLORIDE 20 MG/ML
10 INJECTION INTRAMUSCULAR; INTRAVENOUS
Status: DISCONTINUED | OUTPATIENT
Start: 2022-12-08 | End: 2022-12-08 | Stop reason: HOSPADM

## 2022-12-08 RX ORDER — MEPERIDINE HYDROCHLORIDE 25 MG/ML
12.5 INJECTION INTRAMUSCULAR; INTRAVENOUS; SUBCUTANEOUS EVERY 5 MIN PRN
Status: DISCONTINUED | OUTPATIENT
Start: 2022-12-08 | End: 2022-12-08 | Stop reason: HOSPADM

## 2022-12-08 RX ORDER — OXYCODONE HYDROCHLORIDE AND ACETAMINOPHEN 5; 325 MG/1; MG/1
1 TABLET ORAL EVERY 6 HOURS PRN
Qty: 10 TABLET | Refills: 0 | Status: SHIPPED | OUTPATIENT
Start: 2022-12-08 | End: 2022-12-11

## 2022-12-08 RX ORDER — PROPOFOL 10 MG/ML
INJECTION, EMULSION INTRAVENOUS PRN
Status: DISCONTINUED | OUTPATIENT
Start: 2022-12-08 | End: 2022-12-08 | Stop reason: SDUPTHER

## 2022-12-08 RX ADMIN — LIDOCAINE HYDROCHLORIDE 100 MG: 20 INJECTION, SOLUTION INTRAVENOUS at 09:31

## 2022-12-08 RX ADMIN — HYDROMORPHONE HYDROCHLORIDE 0.25 MG: 1 INJECTION, SOLUTION INTRAMUSCULAR; INTRAVENOUS; SUBCUTANEOUS at 10:50

## 2022-12-08 RX ADMIN — ROCURONIUM BROMIDE 35 MG: 10 INJECTION, SOLUTION INTRAVENOUS at 09:31

## 2022-12-08 RX ADMIN — FENTANYL CITRATE 50 MCG: 50 INJECTION, SOLUTION INTRAMUSCULAR; INTRAVENOUS at 09:45

## 2022-12-08 RX ADMIN — FENTANYL CITRATE 150 MCG: 50 INJECTION, SOLUTION INTRAMUSCULAR; INTRAVENOUS at 09:31

## 2022-12-08 RX ADMIN — METHOCARBAMOL 1000 MG: 100 INJECTION, SOLUTION INTRAMUSCULAR; INTRAVENOUS at 10:29

## 2022-12-08 RX ADMIN — KETOROLAC TROMETHAMINE 30 MG: 30 INJECTION, SOLUTION INTRAMUSCULAR at 09:55

## 2022-12-08 RX ADMIN — HYDROMORPHONE HYDROCHLORIDE 0.5 MG: 1 INJECTION, SOLUTION INTRAMUSCULAR; INTRAVENOUS; SUBCUTANEOUS at 10:15

## 2022-12-08 RX ADMIN — DEXAMETHASONE SODIUM PHOSPHATE 10 MG: 10 INJECTION INTRAMUSCULAR; INTRAVENOUS at 09:38

## 2022-12-08 RX ADMIN — CEFAZOLIN 3000 MG: 10 INJECTION, POWDER, FOR SOLUTION INTRAVENOUS at 09:29

## 2022-12-08 RX ADMIN — FENTANYL CITRATE 50 MCG: 50 INJECTION, SOLUTION INTRAMUSCULAR; INTRAVENOUS at 10:04

## 2022-12-08 RX ADMIN — GLYCOPYRROLATE 0.2 MG: 0.2 INJECTION, SOLUTION INTRAMUSCULAR; INTRAVENOUS at 09:29

## 2022-12-08 RX ADMIN — GLYCOPYRROLATE 0.6 MG: 0.2 INJECTION, SOLUTION INTRAMUSCULAR; INTRAVENOUS at 09:55

## 2022-12-08 RX ADMIN — PROPOFOL 170 MG: 10 INJECTION, EMULSION INTRAVENOUS at 09:31

## 2022-12-08 RX ADMIN — HYDROMORPHONE HYDROCHLORIDE 0.5 MG: 1 INJECTION, SOLUTION INTRAMUSCULAR; INTRAVENOUS; SUBCUTANEOUS at 10:20

## 2022-12-08 RX ADMIN — ONDANSETRON 4 MG: 2 INJECTION INTRAMUSCULAR; INTRAVENOUS at 09:55

## 2022-12-08 RX ADMIN — SODIUM CHLORIDE: 9 INJECTION, SOLUTION INTRAVENOUS at 09:09

## 2022-12-08 RX ADMIN — Medication 3 MG: at 09:55

## 2022-12-08 RX ADMIN — HYDROMORPHONE HYDROCHLORIDE 0.25 MG: 1 INJECTION, SOLUTION INTRAMUSCULAR; INTRAVENOUS; SUBCUTANEOUS at 10:55

## 2022-12-08 RX ADMIN — MIDAZOLAM 2 MG: 1 INJECTION INTRAMUSCULAR; INTRAVENOUS at 09:22

## 2022-12-08 ASSESSMENT — PAIN DESCRIPTION - LOCATION
LOCATION: ABDOMEN

## 2022-12-08 ASSESSMENT — PAIN DESCRIPTION - ORIENTATION
ORIENTATION: ANTERIOR;UPPER
ORIENTATION: ANTERIOR
ORIENTATION: ANTERIOR
ORIENTATION: ANTERIOR;UPPER
ORIENTATION: ANTERIOR
ORIENTATION: UPPER;ANTERIOR
ORIENTATION: ANTERIOR

## 2022-12-08 ASSESSMENT — PAIN SCALES - GENERAL
PAINLEVEL_OUTOF10: 8
PAINLEVEL_OUTOF10: 6
PAINLEVEL_OUTOF10: 8
PAINLEVEL_OUTOF10: 8
PAINLEVEL_OUTOF10: 6
PAINLEVEL_OUTOF10: 3
PAINLEVEL_OUTOF10: 5

## 2022-12-08 ASSESSMENT — PAIN DESCRIPTION - DESCRIPTORS
DESCRIPTORS: SHARP
DESCRIPTORS: SORE

## 2022-12-08 ASSESSMENT — PAIN - FUNCTIONAL ASSESSMENT: PAIN_FUNCTIONAL_ASSESSMENT: 0-10

## 2022-12-08 NOTE — H&P
General Surgery History and Physical  Wilhemenia Hodgkins, MD    Patient's Name/Date of Birth: Niko Vidal / 1980    Date: December 8, 2022     Surgeon: Jo Ann Davis M.D., M.S.    PCP: Anshu Aquino DO     Chief Complaint: Right upper quadrant pain    HPI:   Niko Vidal is a 43 y.o. male who presents for evaluation of right upper quadrant pain, symptomatic gallstones. Timing is intermittent, radiation to back, alleviated by npo and started several weeks ago. Denies SOB, chest pain, fever, chills, diarrhea, constipation. They have been seen by medical and emergency care professionals for the same complaints and referred for surgical evaluation. Hx of RNYGB and going well with weight loss success over 110lbs to date. Past Medical History:   Diagnosis Date    Irritable bowel syndrome     Morbid (severe) obesity due to excess calories ([address]     Obesity     Prehypertension     Tobacco use     quit 10/2020       Past Surgical History:   Procedure Laterality Date    COLONOSCOPY  2009    normal    BRITTANY-EN-Y GASTRIC BYPASS N/A 12/14/2021    GASTRIC BYPASS BRITTANY-EN-Y LAPAROSCOPIC ROBOTIC XI performed by Jo Ann Davis MD at [address] 8/24/2021    EGD BIOPSY performed by Jo Ann Davis MD at [place] [address]       No current facility-administered medications for this encounter.      Current Outpatient Medications   Medication Sig Dispense Refill    omeprazole (PRILOSEC) 20 MG delayed release capsule take 1 capsule by mouth once daily 30 capsule 12    Calcium Carbonate (CALCI-CHEW PO) Take 1 tablet by mouth 3 times daily      Ferrous Sulfate (IRON PO) Take 1 tablet by mouth daily      Multiple Vitamin (MVI, BARIATRIC ADVANTAGE MULTI-FORMULA, CHEW TAB) Take 1 tablet by mouth 2 times daily      vitamin D (CHOLECALCIFEROL) 125 MCG (5000 UT) CAPS capsule Take 5,000 Units by mouth daily         Allergies   Allergen Reactions    Adhesive Tape Lactose Intolerance (Gi) Diarrhea     Pt avoids excessive milk or ice cream        The patient has a family history that is negative for severe cardiovascular or respiratory issues, negative for reaction to anesthesia. No history of gastrointestinal cancers in mother, father or siblings. Social History     Socioeconomic History    Marital status:      Spouse name: Not on file    Number of children: Not on file    Years of education: Not on file    Highest education level: Not on file   Occupational History    Not on file   Tobacco Use    Smoking status: Former     Packs/day: 0.50     Years: 12.00     Pack years: 6.00     Types: Cigarettes     Quit date: 2020     Years since quittin.1    Smokeless tobacco: Never   Vaping Use    Vaping Use: Former    Substances: Nicotine, THC (last use a few weeks ago)   Substance and Sexual Activity    Alcohol use: Not Currently    Drug use: Never    Sexual activity: Yes     Partners: Female   Other Topics Concern    Not on file   Social History Narrative    Drinks 2-3 cups of coffee daily. Social Determinants of Health     Financial Resource Strain: Not on file   Food Insecurity: Not on file   Transportation Needs: Not on file   Physical Activity: Not on file   Stress: Not on file   Social Connections: Not on file   Intimate Partner Violence: Not on file   Housing Stability: Not on file         Review of Systems    A complete 10 system review was performed and are otherwise negative unless mentioned in the above HPI. Specific negatives are listed below but may not include all those reviewed.     General ROS: negative obtundation, AMS  ENT ROS: negative rhinorrhea, epistaxis  Allergy and Immunology ROS: negative itchy/watery eyes or nasal congestion  Hematological and Lymphatic ROS: negative spontaneous bleeding or bruising  Endocrine ROS: negative  lethargy, mood swings, palpitations or polydipsia/polyuria  Respiratory ROS: negative sputum changes, stridor, tachypnea or wheezing  Cardiovascular ROS: negative for - loss of consciousness, murmur or orthopnea  Gastrointestinal ROS: negative for - hematochezia or hematemesis  Genito-Urinary ROS: negative for -  genital discharge or hematuria  Musculoskeletal ROS: negative for - focal weakness, gangrene  Psych/Neuro ROS: negative for - visual or auditory hallucinations, suicidal ideation    Physical exam:   Ht 6' 2\" (1.88 m)   Wt 256 lb (116.1 kg)   BMI 32.87 kg/m²   General appearance:  NAD, appears stated age  Head: NCAT, PERRLA, EOMI, red conjunctiva  Neck: supple, no masses, trachea midline  Lungs: Equal chest rise bilateral, no retractions, no wheezing  Heart: Reg rate  Abdomen: soft, obese, tender RUQ  Skin; warm and dry, no cyanosis  Gu: no cva tenderness  Extremities: atraumatic, no focal motor deficits, no open wounds  Psych: No tremor, visual hallucinations        Radiology: I reviewed relevant abdominal imaging from this admission and that available in the EMR including RUQ US. My assessment is gallstones with wall thickening      Assessment:  43 y.o. male with symptomatic cholelithiasis, RUQ pain, bloating, nausea, recurrent    Plan:  I reviewed the US performed and relevant abdominal imaging independently and entirely  I reviewed ER and medical specialist notes relating to the patients above complaints  We discussed the imaging and relevant labs  We discussed nonoperative treatment as well as medications and surgical options  I discussed mechanism of action of medications and futility of that treatment in my opinion. I discussed gallbladder function and consequences of removal at length including its interaction with the liver and pancreas and bowel and role in digestion.   Recommend to proceed OR for laparoscopic possible robotic cholecystectomy with intraoperative cholangiogram  Scheduling will be accomplished today in the office at the patients request        Discussed risks of injury to liver, common bile duct, hepatic duct, surrounding vascular structures, small bowel, stomach. Risk for further surgery to correct complications.   Plan for laparoscopic, possible open cholecystectomy with possible intraoperative cholangiogram. Patient agrees and all questions answered to their and family's satisfaction      Stefanie Nation MD  7:46 AM  12/8/2022

## 2022-12-08 NOTE — ANESTHESIA POSTPROCEDURE EVALUATION
Department of Anesthesiology  Postprocedure Note    Patient: Shannon Hughes  MRN: [id number]  YOB: 1980  Date of evaluation: 12/8/2022      Procedure Summary     Date: 12/08/22 Room / Location: [address] [place] [address] / [address] [place] [address]    Anesthesia Start: 0920 Anesthesia Stop: 1008    Procedure: LAPAROSCOPIC CHOLECYSTECTOMY WITH, INTRAOPERATIVE CHOLANGIOGRAM (Abdomen) Diagnosis:       Cholelithiasis      Abdominal pain, epigastric      (Cholelithiasis [K80.20])      (Abdominal pain, epigastric [R10.13])    Surgeons: Kallie Su MD Responsible Provider: Geri Hampton MD    Anesthesia Type: general ASA Status: 3          Anesthesia Type: No value filed.     Orestes Phase I: Orestes Score: 10    Orestes Phase II: Orestes Score: 10      Anesthesia Post Evaluation    Patient location during evaluation: PACU  Patient participation: complete - patient participated  Level of consciousness: awake  Pain score: 3  Airway patency: patent  Nausea & Vomiting: no nausea and no vomiting  Complications: no  Cardiovascular status: hemodynamically stable  Respiratory status: acceptable

## 2022-12-08 NOTE — OP NOTE
[place] [address] [facility]  Operative Note      DATE OF PROCEDURE: 12/8/2022    SURGEON: Vivi Johnston MD    ASSISTANT: First Mitzy [place]    PREOPERATIVE DIAGNOSIS: Symptomatic Cholelithiasis    POSTOPERATIVE DIAGNOSIS: Symptomatic Cholelithiasis    OPERATION: Laparoscopic cholecystectomy with intraoperative cholangiogram    ANESTHESIA: General endotracheal.    ESTIMATED BLOOD LOSS: Less than 10 mL. COMPLICATIONS: None. FLUIDS: Crystalloid. SPECIMEN: Gallbladder. DESCRIPTION OF PROCEDURE: This is a 43 y.o. male increasingly symptomatic right upper quadrant epigastric pain. After being explained the risks, benefits, and alternatives of the procedure, the patient agreed to proceed. We discussed at length the risks of bleeding, biliary and liver ductal injury, need for repeat or open procedures, need for catheter drainage and prolonged hospitalization. The patient agreed to proceed. The patient was taken to the operating room and placed supine on the operating room table, administered general anesthesia and intubated. Once the airway was secured and the patient was adequately sedated a time-out was performed to confirm the surgical site and the patient's name. We initially made a 5-mm incision superior to the umbilicus, inserted a Veress needle, confirmed needle placement and insufflated to 15 mmHg. We then removed the Veress needle and inserted a 5-mm trocar and inserted a camera to follow, and inspected the abdomen. Upon inspection of the abdomen, there was moderate inflammation around the right upper quadrant near the gallbladder. A 12-mm trocar was inserted subxiphoid just right and lateral to the falciform ligament and two more 5-mm trocars in the right upper quadrant. Atraumatic graspers were used grasp the gallbladder and retract cephalad. We then again retracted the gallbladder cephalad and exposed the infundibulum identifying the infundibulum. We dissected the peritoneum and omentum as well as transverse colon and duodenum off the infundibulum identifying the cystic duct junction. The cystic duct was skeletonized. The cystic artery was also identified ands skeletonized confirming it was leading directly into the gallbladder. The critical view was obtained. The 10mm titanium clip applier was used to place a single clip on the gallbladder side of the cystic duct. 2 clips proximally on the patient side and one distal on the gallbladder side of the cystic artery. A ductotomy was perfomed in the cystic duct and the Mixter catheter was inserted and clamped in place with the garcias clamp. The catheter was flushed with saline first. It flushed well. A cholangiogram was performed with full strength dye showing full arborization of the intrahepatic biliary tree, common hepatic duct and cystic duct confluence into the common bile duct. Delayed imaging showed spillage of contrast into the small bowel. Our perceived anatomy was confirmed with the cholangiogram. No obstruction was appreciated. The catheter was removed, two clips were place on the patient side of the cystic duct. Pause was taken again to identify the critical view. The cystic duct was transected with laparoscopic scissors. Electrocautery was then used to dissect the gallbladder from the gallbladder fossa. Traction-countertraction technique was used to expose the medial and lateral aspects of the gallbladder. Gallbladder was completely dissected off the gallbladder fossa, placed in a laparoscopic bag, and retracted through the 12-mm port site. We then inspected our clip sites which were intact. The gallbladder fossa was dry at this point and clips remained intact. We then removed the trocars under direct visualization. The abdomen was decompressed. The subxiphoid fascial incision was closed using 0 Vicryl suture and the suture closure device.   Then we used 4-0 Monocryl suture to reapproximate the skin. Surgical glue was placed over the skin. The patient was awoken and extubated in the operating room without any difficulty, and transferred to the postoperative care unit in stable condition. All instrument counts, lap counts, and needle counts were correct at the completion of the procedure.     Jessica Zamudio MD  Minimally Invasive and Bariatric Surgery  12/8/2022  10:06 AM

## 2022-12-08 NOTE — DISCHARGE INSTRUCTIONS
POST-OPERATIVE INSTRUCTIONS FOR GALLBLADDER SURGERY    Call the office at [phone] to schedule your post-operative appointment with Dr. Kayden Bernal for two (2) weeks. You will have surgical glue closing your incisions, you may shower 24hours after your surgery but do not rub off glue, it will come off on its own over time. Do not bathe for 3 days after your surgery, shower only and pat incisions dry after shower. General guidelines for activity:   Avoid strenuous activity or lifting anything heavier than 15 pounds for 4 weeks. It is OK to be up, walking around, and walking up and down stairs. Do what is comfortable: stop and rest when you feel tired. Drink plenty of fluids and stay on a bland diet for 2-3 days after surgery. Do NOT drive for one week and while taking your narcotic pain medicine. Watch for signs of infection:  Excessive warmth or bright redness around your incisions  Leakage of bloody or cloudy fluid from you incisions  Fever over 100.5    During the laparoscopic procedure that you had, gas is pumped into the abdominal cavity. You may feel abdominal, shoulder, or rib pain for a few days due to this gas. You will have pain medicine ordered. Take as directed. BOWELS: constipation is a side effect of your pain meds, take a daily laxative (miralax, dulcolax, etc.) as needed to keep your bowels moving as they normally do, do not go 2-3 days without having a bowel movement. Pain medications; Percocet- take at least 1/2 pill every 6 hours the first 36 hours after surgery, and may take as many as 2 pills every 4 hours. After the first 36hours only take the pills as needed and stop them as soon as possible.  Pain meds cause constipation

## 2023-01-06 ENCOUNTER — OFFICE VISIT (OUTPATIENT)
Dept: BARIATRICS/WEIGHT MGMT | Age: 43
End: 2023-01-06

## 2023-01-06 VITALS
RESPIRATION RATE: 20 BRPM | HEART RATE: 64 BPM | WEIGHT: 246 LBS | TEMPERATURE: 98.6 F | HEIGHT: 74 IN | SYSTOLIC BLOOD PRESSURE: 136 MMHG | DIASTOLIC BLOOD PRESSURE: 82 MMHG | BODY MASS INDEX: 31.57 KG/M2

## 2023-01-06 DIAGNOSIS — K80.20 SYMPTOMATIC CHOLELITHIASIS: ICD-10-CM

## 2023-01-06 DIAGNOSIS — K21.9 GASTROESOPHAGEAL REFLUX DISEASE WITHOUT ESOPHAGITIS: Primary | ICD-10-CM

## 2023-01-06 DIAGNOSIS — K91.2 MALNUTRITION FOLLOWING GASTROINTESTINAL SURGERY: ICD-10-CM

## 2023-01-06 PROCEDURE — 99024 POSTOP FOLLOW-UP VISIT: CPT | Performed by: SURGERY

## 2023-01-06 RX ORDER — HYDROCORTISONE ACETATE 25 MG/1
25 SUPPOSITORY RECTAL EVERY 12 HOURS
Qty: 28 SUPPOSITORY | Refills: 0 | Status: SHIPPED | OUTPATIENT
Start: 2023-01-06 | End: 2023-01-20

## 2023-01-06 NOTE — PROGRESS NOTES
Patient is 1 yr LRYGB 2 wk Lap Emmie. Incisions are healing well. Water intake is around 1 liter daily. Protein through shakes and foods. Vitamin intake is good. Bowel movements - some constipation. Patient states that it has improved.

## 2023-01-06 NOTE — PROGRESS NOTES
Sujey Gonzalez  1/6/2023  [address]     Devika-en- Y Gastric Bypass  1 Year Post-Operative Follow-up     Sujey Gonzalez is a 43 y.o. male who is 1 years post Laparoscopic Devika-en-Y Gastric Bypass surgery. Reports feeling better after GB removal in Dec. He is not having swallowing difficulty, is compliant most of the time with the multivitamins and calcium + Vit D. He is meeting fluid recommendations of at least 64 ounces per day and is meeting protein recommendations. He  is exercising: walking 30 minutes/day- 3 days/week. Weight=246 lb (111.6 kg)  Today's weight represents a total weight loss of 169 pounds since surgery with 0 pounds regained since the lowest weight. Prior to Admission medications    Medication Sig Start Date End Date Taking?  Authorizing Provider   ondansetron (ZOFRAN-ODT) 4 MG disintegrating tablet Take 1 tablet by mouth every 8 hours as needed for Nausea or Vomiting 12/8/22  Yes Patikasey Lozano MD   omeprazole (PRILOSEC) 20 MG delayed release capsule take 1 capsule by mouth once daily 11/30/22  Yes Patikasey Lozano MD   Calcium Carbonate (CALCI-CHEW PO) Take 1 tablet by mouth 3 times daily   Yes Historical Provider, MD   Ferrous Sulfate (IRON PO) Take 1 tablet by mouth daily   Yes Historical Provider, MD   Multiple Vitamin (MVI, BARIATRIC ADVANTAGE MULTI-FORMULA, CHEW TAB) Take 1 tablet by mouth 2 times daily   Yes Historical Provider, MD   vitamin D (CHOLECALCIFEROL) 125 MCG (5000 UT) CAPS capsule Take 5,000 Units by mouth daily   Yes Historical Provider, MD        Allergies   Allergen Reactions    Adhesive Tape     Lactose Intolerance (Gi) Diarrhea     Pt avoids excessive milk or ice cream            Past Medical History:   Diagnosis Date    Irritable bowel syndrome     Morbid (severe) obesity due to excess calories ([address]     Obesity     Prehypertension     Tobacco use     quit 10/2020       Past Surgical History:   Procedure Laterality Date    CHOLECYSTECTOMY, LAPAROSCOPIC N/A 2022    LAPAROSCOPIC CHOLECYSTECTOMY WITH, INTRAOPERATIVE CHOLANGIOGRAM performed by Sangita Gaxiola MD at [address]      normal    BRITTANY-EN-Y GASTRIC BYPASS N/A 2021    GASTRIC BYPASS BRITTANY-EN-Y LAPAROSCOPIC ROBOTIC XI performed by Sangita Gaxiola MD at [address]  2021    EGD BIOPSY performed by Sangita Gaxiola MD at [place] [address]       Current Outpatient Medications   Medication Sig Dispense Refill    ondansetron (ZOFRAN-ODT) 4 MG disintegrating tablet Take 1 tablet by mouth every 8 hours as needed for Nausea or Vomiting 20 tablet 1    omeprazole (PRILOSEC) 20 MG delayed release capsule take 1 capsule by mouth once daily 30 capsule 12    Calcium Carbonate (CALCI-CHEW PO) Take 1 tablet by mouth 3 times daily      Ferrous Sulfate (IRON PO) Take 1 tablet by mouth daily      Multiple Vitamin (MVI, BARIATRIC ADVANTAGE MULTI-FORMULA, CHEW TAB) Take 1 tablet by mouth 2 times daily      vitamin D (CHOLECALCIFEROL) 125 MCG (5000 UT) CAPS capsule Take 5,000 Units by mouth daily       No current facility-administered medications for this visit.        Allergies   Allergen Reactions    Adhesive Tape     Lactose Intolerance (Gi) Diarrhea     Pt avoids excessive milk or ice cream        Family History   Problem Relation Age of Onset    Diabetes Mother     High Blood Pressure Mother     Diabetes Father     Other Father         aneurysm    Other Sister         hx gastric bypass    Diabetes Sister        Social History     Socioeconomic History    Marital status:      Spouse name: Not on file    Number of children: Not on file    Years of education: Not on file    Highest education level: Not on file   Occupational History    Not on file   Tobacco Use    Smoking status: Former     Packs/day: 0.50     Years: 12.00     Pack years: 6.00     Types: Cigarettes     Quit date: 2020     Years since quittin.2    Smokeless tobacco: Never   Vaping Use    Vaping Use: Former    Substances: Nicotine, THC (last use a few weeks ago)   Substance and Sexual Activity    Alcohol use: Not Currently    Drug use: Never    Sexual activity: Yes     Partners: Female   Other Topics Concern    Not on file   Social History Narrative    Drinks 2-3 cups of coffee daily. Social Determinants of Health     Financial Resource Strain: Not on file   Food Insecurity: Not on file   Transportation Needs: Not on file   Physical Activity: Not on file   Stress: Not on file   Social Connections: Not on file   Intimate Partner Violence: Not on file   Housing Stability: Not on file       A complete 10 system review was performed and are otherwise negative unless mentioned in the above HPI. Specific negatives are listed below but may not include all those reviewed.     General ROS: negative obtundation, AMS  ENT ROS: negative rhinorrhea, epistaxis  Allergy and Immunology ROS: negative itchy/watery eyes or nasal congestion  Hematological and Lymphatic ROS: negative spontaneous bleeding or bruising  Endocrine ROS: negative  lethargy, mood swings, palpitations or polydipsia/polyuria  Respiratory ROS: negative sputum changes, stridor, tachypnea or wheezing  Cardiovascular ROS: negative for - loss of consciousness, murmur or orthopnea  Gastrointestinal ROS: negative for - hematochezia or hematemesis  Genito-Urinary ROS: negative for -  genital discharge or hematuria  Musculoskeletal ROS: negative for - focal weakness, gangrene  Psych/Neuro ROS: negative for - visual or auditory hallucinations, suicidal ideation        Physical exam:   /82 (Site: Left Lower Arm, Position: Sitting, Cuff Size: Medium Adult)   Pulse 64   Temp 98.6 °F (37 °C) (Temporal)   Resp 20   Ht 6' 2\" (1.88 m)   Wt 246 lb (111.6 kg)   BMI 31.58 kg/m²    General appearance: alert, appears stated age and cooperative  Head: Normocephalic, without obvious abnormality, atraumatic  Neck: no adenopathy, no carotid bruit, no JVD, supple, symmetrical, trachea midline and thyroid not enlarged, symmetric, no tenderness/mass/nodules  Lungs: clear to auscultation bilaterally  Heart: regular rate and rhythm  Abdomen: soft, non-tender; bowel sounds normal; no masses,  no organomegaly  Extremities: extremities normal, atraumatic, no cyanosis or edema    Assessment/Plan: Ansley Feliciano is a 43 y.o. male who is 1 yrs Post Devika-en- Y Gastric Bypass. He does not complain of GERD,  does not have sleep apnea,  does not have diabetes,  does not have hypertension off medical treatment. Malnutrition following gastrointestinal surgery: Continue to eat a high protein, low calorie diet, eat small portions very slowly and chew well before swallowing. Drink plenty of water and fluids. Maintain vitamin supplementation as directed by dietary nutritional counseling  Excessive skin secondary to malnutrition and rapid weight loss: Keep skin folds clean and dry, nystatin cream and powder as needed for areas of persistent rash  Constipation: Maintain 2 to 6 g of fiber supplementation daily, 64 ounces of water daily  Dietary noncompliance: Maintain 80 to 100 g of protein intake daily, continue to keep food log to record daily protein in caloric intake. Meal prep in order to maintain strict dietary control of caloric intake. Avoid taking meals and distracting environment such as in front of the television or at restaurants. Spread meals out over 15 to 30 minutes to ensure adequate chewing and swallowing and limit overeating  One month s/p lap zoey, feeling better but still some intermittent bloating and abd pain sx that are resolving.    Hemorrhoid- anusol suppository BID for 2 weeks, stool softener, SITZ baths      Follow-up 6 to 12 months    Physician Signature: Electronically signed by Dr. Evelyn Monteiro MD

## 2023-01-06 NOTE — PATIENT INSTRUCTIONS
Please continue to take your vitamin and mineral supplements as instructed. If you received a blood work prescription today for laboratory monitoring due prior to your next routine follow-up visit, please have this blood work obtained 10 to 14 days prior to your next visit. It is important to fast for 12 hours prior to routine weight loss surgery blood work, EXCEPT for drinking water, to ensure accuracy of results. Please report nausea, vomiting, abdominal pain, or any other problems you experience to your surgeon. For problems related to weight loss surgery, it is best to go to [address] [place] [address] Emergency Department and have your surgeon paged.

## 2023-07-03 DIAGNOSIS — K91.2 POSTSURGICAL NONABSORPTION: Primary | ICD-10-CM

## 2023-07-10 ENCOUNTER — OFFICE VISIT (OUTPATIENT)
Dept: BARIATRICS/WEIGHT MGMT | Age: 43
End: 2023-07-10
Payer: COMMERCIAL

## 2023-07-10 VITALS
TEMPERATURE: 97.7 F | RESPIRATION RATE: 20 BRPM | DIASTOLIC BLOOD PRESSURE: 70 MMHG | SYSTOLIC BLOOD PRESSURE: 112 MMHG | HEART RATE: 73 BPM | HEIGHT: 74 IN | WEIGHT: 203 LBS | BODY MASS INDEX: 26.05 KG/M2

## 2023-07-10 DIAGNOSIS — K91.2 MALNUTRITION FOLLOWING GASTROINTESTINAL SURGERY: Primary | ICD-10-CM

## 2023-07-10 DIAGNOSIS — R10.13 EPIGASTRIC PAIN: ICD-10-CM

## 2023-07-10 DIAGNOSIS — K21.9 GASTROESOPHAGEAL REFLUX DISEASE WITHOUT ESOPHAGITIS: ICD-10-CM

## 2023-07-10 PROCEDURE — 99211 OFF/OP EST MAY X REQ PHY/QHP: CPT

## 2023-07-10 PROCEDURE — 99213 OFFICE O/P EST LOW 20 MIN: CPT | Performed by: NURSE PRACTITIONER

## 2023-07-10 RX ORDER — SUCRALFATE 1 G/1
0.5 TABLET ORAL 4 TIMES DAILY
Qty: 90 TABLET | Refills: 0 | Status: SHIPPED | OUTPATIENT
Start: 2023-07-10 | End: 2023-08-24

## 2023-07-10 NOTE — PROGRESS NOTES
18 mth LRYGB follow up. Water intake is a liter in the morning and then 64 oz throughout the day. He feels like when he drinks something gets stuck. Having bowel movements, vitamin intake is good. Getting protein through shakes. Pt has difficulty with some meats.
tablet by mouth 3 times daily   Yes Historical Provider, MD   Ferrous Sulfate (IRON PO) Take 1 tablet by mouth daily   Yes Historical Provider, MD   Multiple Vitamin (MVI, BARIATRIC ADVANTAGE MULTI-FORMULA, CHEW TAB) Take 1 tablet by mouth 2 times daily   Yes Historical Provider, MD   vitamin D (CHOLECALCIFEROL) 125 MCG (5000 UT) CAPS capsule Take 1 capsule by mouth daily   Yes Historical Provider, MD        Allergies   Allergen Reactions    Adhesive Tape     Lactose Intolerance (Gi) Diarrhea     Pt avoids excessive milk or ice cream            Past Medical History:   Diagnosis Date    Irritable bowel syndrome     Morbid (severe) obesity due to excess calories ([address])     Obesity     Prehypertension     Tobacco use     quit 10/2020       Past Surgical History:   Procedure Laterality Date    CHOLECYSTECTOMY, LAPAROSCOPIC N/A 12/8/2022    LAPAROSCOPIC CHOLECYSTECTOMY WITH, INTRAOPERATIVE CHOLANGIOGRAM performed by Ainsley Stringer MD at [address] [facility]  2009    normal    BRITTANY-EN-Y GASTRIC BYPASS N/A 12/14/2021    GASTRIC BYPASS BRITTANY-EN-Y LAPAROSCOPIC ROBOTIC XI performed by Ainsley Stringer MD at [address] [facility] 8/24/2021    EGD BIOPSY performed by Ainsley Stringer MD at [address] [facility]       Current Outpatient Medications   Medication Sig Dispense Refill    sucralfate (CARAFATE) 1 GM tablet Take 0.5 tablets by mouth 4 times daily 90 tablet 0    ondansetron (ZOFRAN-ODT) 4 MG disintegrating tablet Take 1 tablet by mouth every 8 hours as needed for Nausea or Vomiting 20 tablet 1    omeprazole (PRILOSEC) 20 MG delayed release capsule take 1 capsule by mouth once daily 30 capsule 12    Calcium Carbonate (CALCI-CHEW PO) Take 1 tablet by mouth 3 times daily      Ferrous Sulfate (IRON PO) Take 1 tablet by mouth daily      Multiple Vitamin (MVI, BARIATRIC ADVANTAGE MULTI-FORMULA, CHEW TAB) Take 1 tablet by mouth 2 times daily      vitamin D (CHOLECALCIFEROL) 125 MCG (5000 UT) CAPS

## 2023-07-10 NOTE — PATIENT INSTRUCTIONS

## 2023-08-19 DIAGNOSIS — R10.13 EPIGASTRIC PAIN: ICD-10-CM

## 2023-08-19 DIAGNOSIS — K21.9 GASTROESOPHAGEAL REFLUX DISEASE WITHOUT ESOPHAGITIS: ICD-10-CM

## 2023-08-21 RX ORDER — SUCRALFATE 1 G/1
TABLET ORAL
Qty: 90 TABLET | Refills: 0 | Status: SHIPPED | OUTPATIENT
Start: 2023-08-21

## 2023-10-04 DIAGNOSIS — K21.9 GASTROESOPHAGEAL REFLUX DISEASE WITHOUT ESOPHAGITIS: ICD-10-CM

## 2023-10-04 DIAGNOSIS — R10.13 EPIGASTRIC PAIN: ICD-10-CM

## 2023-10-04 RX ORDER — SUCRALFATE 1 G/1
TABLET ORAL
Qty: 90 TABLET | Refills: 0 | Status: SHIPPED | OUTPATIENT
Start: 2023-10-04

## 2023-11-27 DIAGNOSIS — K21.9 GASTROESOPHAGEAL REFLUX DISEASE WITHOUT ESOPHAGITIS: ICD-10-CM

## 2023-11-27 DIAGNOSIS — R10.13 EPIGASTRIC PAIN: ICD-10-CM

## 2023-11-27 RX ORDER — SUCRALFATE 1 G/1
TABLET ORAL
Qty: 90 TABLET | Refills: 0 | Status: SHIPPED | OUTPATIENT
Start: 2023-11-27

## 2024-01-05 DIAGNOSIS — K21.9 GASTROESOPHAGEAL REFLUX DISEASE WITHOUT ESOPHAGITIS: ICD-10-CM

## 2024-01-05 DIAGNOSIS — R10.13 EPIGASTRIC PAIN: ICD-10-CM

## 2024-01-08 RX ORDER — SUCRALFATE 1 G/1
TABLET ORAL
Qty: 90 TABLET | Refills: 0 | Status: SHIPPED | OUTPATIENT
Start: 2024-01-08

## 2024-01-18 ENCOUNTER — HOSPITAL ENCOUNTER (OUTPATIENT)
Age: 44
Discharge: HOME | End: 2024-01-18
Payer: COMMERCIAL

## 2024-01-18 DIAGNOSIS — K91.2 MALNUTRITION FOLLOWING GASTROINTESTINAL SURGERY: ICD-10-CM

## 2024-01-18 LAB
ALBUMIN SERPL-MCNC: 4.7 G/DL (ref 3.5–5.2)
ALP SERPL-CCNC: 69 U/L (ref 40–129)
ALT SERPL-CCNC: 26 U/L (ref 0–40)
ANION GAP SERPL CALCULATED.3IONS-SCNC: 9 MMOL/L (ref 7–16)
AST SERPL-CCNC: 30 U/L (ref 0–39)
BILIRUB SERPL-MCNC: 1.6 MG/DL (ref 0–1.2)
BUN SERPL-MCNC: 8 MG/DL (ref 6–20)
CALCIUM SERPL-MCNC: 9.8 MG/DL (ref 8.6–10.2)
CHLORIDE SERPL-SCNC: 103 MMOL/L (ref 98–107)
CHOLEST SERPL-MCNC: 104 MG/DL
CO2 SERPL-SCNC: 29 MMOL/L (ref 22–29)
CREAT SERPL-MCNC: 0.7 MG/DL (ref 0.7–1.2)
ERYTHROCYTE [DISTWIDTH] IN BLOOD BY AUTOMATED COUNT: 12.5 % (ref 11.5–15)
FERRITIN SERPL-MCNC: 209 NG/ML
FOLATE SERPL-MCNC: 16.5 NG/ML (ref 4.8–24.2)
GFR SERPL CREATININE-BSD FRML MDRD: >60 ML/MIN/1.73M2
GLUCOSE SERPL-MCNC: 90 MG/DL (ref 74–99)
HCT VFR BLD AUTO: 45.6 % (ref 37–54)
HDLC SERPL-MCNC: 41 MG/DL
HGB BLD-MCNC: 14.9 G/DL (ref 12.5–16.5)
LDLC SERPL CALC-MCNC: 51 MG/DL
MCH RBC QN AUTO: 31.3 PG (ref 26–35)
MCHC RBC AUTO-ENTMCNC: 32.7 G/DL (ref 32–34.5)
MCV RBC AUTO: 95.8 FL (ref 80–99.9)
PLATELET # BLD AUTO: 194 K/UL (ref 130–450)
PMV BLD AUTO: 11.1 FL (ref 7–12)
POTASSIUM SERPL-SCNC: 4.3 MMOL/L (ref 3.5–5)
PREALB SERPL-MCNC: 23 MG/DL (ref 20–40)
PROT SERPL-MCNC: 7.2 G/DL (ref 6.4–8.3)
RBC # BLD AUTO: 4.76 M/UL (ref 3.8–5.8)
SODIUM SERPL-SCNC: 141 MMOL/L (ref 132–146)
TRIGL SERPL-MCNC: 59 MG/DL
VIT B12 SERPL-MCNC: 1002 PG/ML (ref 211–946)
VLDLC SERPL CALC-MCNC: 12 MG/DL
WBC OTHER # BLD: 3.2 K/UL (ref 4.5–11.5)

## 2024-01-18 PROCEDURE — 82525 ASSAY OF COPPER: CPT

## 2024-01-18 PROCEDURE — 84630 ASSAY OF ZINC: CPT

## 2024-01-18 PROCEDURE — 82306 VITAMIN D 25 HYDROXY: CPT

## 2024-01-18 PROCEDURE — 82746 ASSAY OF FOLIC ACID SERUM: CPT

## 2024-01-18 PROCEDURE — 84134 ASSAY OF PREALBUMIN: CPT

## 2024-01-18 PROCEDURE — 84425 ASSAY OF VITAMIN B-1: CPT

## 2024-01-18 PROCEDURE — 82728 ASSAY OF FERRITIN: CPT

## 2024-01-18 PROCEDURE — 82607 VITAMIN B-12: CPT

## 2024-01-18 PROCEDURE — 36415 COLL VENOUS BLD VENIPUNCTURE: CPT

## 2024-01-18 PROCEDURE — 84590 ASSAY OF VITAMIN A: CPT

## 2024-01-18 PROCEDURE — 80053 COMPREHEN METABOLIC PANEL: CPT

## 2024-01-18 PROCEDURE — 85027 COMPLETE CBC AUTOMATED: CPT

## 2024-01-18 PROCEDURE — 80061 LIPID PANEL: CPT

## 2024-01-20 LAB — 25(OH)D3 SERPL-MCNC: 72.9 NG/ML (ref 30–100)

## 2024-01-22 LAB
COPPER SERPL-MCNC: 79.2 UG/DL (ref 70–140)
ZINC SERPL-MCNC: 73.7 UG/DL (ref 60–120)

## 2024-01-23 LAB
RETINYL PALMITATE: <0.02 MG/L (ref 0–0.1)
VIT B1 PYROPHOSHATE BLD-SCNC: 181 NMOL/L (ref 70–180)
VITAMIN A LEVEL: 0.56 MG/L (ref 0.3–1.2)
VITAMIN A, INTERP: NORMAL

## 2024-01-24 ENCOUNTER — OFFICE VISIT (OUTPATIENT)
Dept: BARIATRICS/WEIGHT MGMT | Age: 44
End: 2024-01-24
Payer: COMMERCIAL

## 2024-01-24 VITALS
SYSTOLIC BLOOD PRESSURE: 116 MMHG | WEIGHT: 188 LBS | RESPIRATION RATE: 20 BRPM | BODY MASS INDEX: 24.13 KG/M2 | TEMPERATURE: 98.1 F | HEIGHT: 74 IN | DIASTOLIC BLOOD PRESSURE: 64 MMHG | HEART RATE: 55 BPM

## 2024-01-24 DIAGNOSIS — K91.2 MALNUTRITION FOLLOWING GASTROINTESTINAL SURGERY: Primary | ICD-10-CM

## 2024-01-24 PROCEDURE — 99213 OFFICE O/P EST LOW 20 MIN: CPT | Performed by: NURSE PRACTITIONER

## 2024-01-24 NOTE — PATIENT INSTRUCTIONS
Please continue to take your vitamin and mineral supplements as instructed.      If you received a blood work prescription today for laboratory monitoring due prior to your next routine follow-up visit, please have this blood work obtained 10 to 14 days prior to your next visit.  It is important to fast for 12 hours prior to routine weight loss surgery blood work, EXCEPT for drinking water, to ensure accuracy of results.    Please report nausea, vomiting, abdominal pain, or any other problems you experience to your surgeon.  For problems related to weight loss surgery, it is best to go to [facility] Emergency Department and have your surgeon paged.    Last Surgical Weight Loss:      1/24/2024     8:36 AM   Surgical Weight Loss Tracker   Consult Date 7/23/2021   Initial Height 6' 2\"   Initial Weight 434 lb   Initial BMI 55.72   Ideal Body Weight 197 lb   Surgery Date 12/14/2021   Pre-Surgical Height 6' 2\"   Pre-Surgical Weight 415 lb   Pre Surgery BMI 53.28   Weight to Lose 218 lb   Date 1/24/2024   Height 6' 2\"   Weight 188 lb   BMI 24.14   Weight Change -227 lb   Total Weight Change -227 lb   % EBWL 104%

## 2024-01-24 NOTE — PROGRESS NOTES
James Hartman  1/24/2024  [facility]    Devika-en- Y Gastric Bypass  2 year Post-Operative Follow-up     Chief Complaint   Patient presents with    Follow Up After Procedure     2 yr LRYGB. Water intake is around 2 liters daily. Protein through shakes and foods. Vitamin intake is good. Bowel movements are good.       James Hartman is a 43 y.o. male who is 2 year  post Laparoscopic Devika-en-Y Gastric Bypass surgery.  Reports that he is doing good. He reports that in the mornings he feels like there is a lump in his throat. When he drinks water he will burp and clear it out.  He is not having swallowing difficulty. Patient denies nausea and vomiting. Patient denies gastric reflux symptoms as long as he takes it daily. Bowel movements are normal per patient. He was having issues with hemorrhoids in the past, started to go away a couple of weeks ago. Patient is taking fiber supplements, which include fiber gummies 3 per day.     Patient is compliant most of the time with the MVI with iron. He is meeting fluid recommendations of at least 64 ounces per day and is meeting protein recommendations. He  is exercising:  lifts weights and walking .     Last Surgical Weight Loss:      1/24/2024     8:36 AM   Surgical Weight Loss Tracker   Consult Date 7/23/2021   Initial Height 6' 2\"   Initial Weight 434 lb   Initial BMI 55.72   Ideal Body Weight 197 lb   Surgery Date 12/14/2021   Pre-Surgical Height 6' 2\"   Pre-Surgical Weight 415 lb   Pre Surgery BMI 53.28   Weight to Lose 218 lb   Date 1/24/2024   Height 6' 2\"   Weight 188 lb   BMI 24.14   Weight Change -227 lb   Total Weight Change -227 lb   % EBWL 104%       Weight=85.3 kg (188 lb)  Today's weight represents a total weight loss of 227 pounds since surgery with 0 pounds regained since the lowest weight.    Prior to Admission medications    Medication Sig Start Date End Date Taking? Authorizing Provider   sucralfate (CARAFATE) 1 GM tablet take 1/2

## 2024-02-25 DIAGNOSIS — K21.9 GASTROESOPHAGEAL REFLUX DISEASE WITHOUT ESOPHAGITIS: ICD-10-CM

## 2024-02-25 DIAGNOSIS — R10.13 EPIGASTRIC PAIN: ICD-10-CM

## 2024-02-26 RX ORDER — SUCRALFATE 1 G/1
TABLET ORAL
Qty: 90 TABLET | Refills: 0 | Status: SHIPPED | OUTPATIENT
Start: 2024-02-26

## 2024-04-24 DIAGNOSIS — R10.13 EPIGASTRIC PAIN: ICD-10-CM

## 2024-04-24 DIAGNOSIS — K21.9 GASTROESOPHAGEAL REFLUX DISEASE WITHOUT ESOPHAGITIS: ICD-10-CM

## 2024-04-25 RX ORDER — SUCRALFATE 1 G/1
TABLET ORAL
Qty: 90 TABLET | Refills: 0 | Status: SHIPPED | OUTPATIENT
Start: 2024-04-25

## 2025-01-03 ENCOUNTER — HOSPITAL ENCOUNTER (OUTPATIENT)
Age: 45
Discharge: HOME | End: 2025-01-03
Payer: COMMERCIAL

## 2025-01-03 DIAGNOSIS — K91.2 MALNUTRITION FOLLOWING GASTROINTESTINAL SURGERY: ICD-10-CM

## 2025-01-03 LAB
25(OH)D3 SERPL-MCNC: 52.9 NG/ML (ref 30–100)
ALBUMIN SERPL-MCNC: 4.4 G/DL (ref 3.5–5.2)
ALP SERPL-CCNC: 78 U/L (ref 40–129)
ALT SERPL-CCNC: 17 U/L (ref 0–40)
ANION GAP SERPL CALCULATED.3IONS-SCNC: 7 MMOL/L (ref 7–16)
AST SERPL-CCNC: 20 U/L (ref 0–39)
BILIRUB SERPL-MCNC: 1.2 MG/DL (ref 0–1.2)
BUN SERPL-MCNC: 8 MG/DL (ref 6–20)
CALCIUM SERPL-MCNC: 9.4 MG/DL (ref 8.6–10.2)
CHLORIDE SERPL-SCNC: 102 MMOL/L (ref 98–107)
CHOLEST SERPL-MCNC: 108 MG/DL
CO2 SERPL-SCNC: 29 MMOL/L (ref 22–29)
CREAT SERPL-MCNC: 0.8 MG/DL (ref 0.7–1.2)
ERYTHROCYTE [DISTWIDTH] IN BLOOD BY AUTOMATED COUNT: 12.2 % (ref 11.5–15)
FERRITIN SERPL-MCNC: 232 NG/ML
FOLATE SERPL-MCNC: 12.5 NG/ML (ref 4.8–24.2)
GFR, ESTIMATED: >90 ML/MIN/1.73M2
GLUCOSE SERPL-MCNC: 96 MG/DL (ref 74–99)
HCT VFR BLD AUTO: 43.6 % (ref 37–54)
HDLC SERPL-MCNC: 51 MG/DL
HGB BLD-MCNC: 14.1 G/DL (ref 12.5–16.5)
LDLC SERPL CALC-MCNC: 48 MG/DL
MCH RBC QN AUTO: 30.7 PG (ref 26–35)
MCHC RBC AUTO-ENTMCNC: 32.3 G/DL (ref 32–34.5)
MCV RBC AUTO: 94.8 FL (ref 80–99.9)
PLATELET # BLD AUTO: 210 K/UL (ref 130–450)
PMV BLD AUTO: 10.9 FL (ref 7–12)
POTASSIUM SERPL-SCNC: 4.1 MMOL/L (ref 3.5–5)
PREALB SERPL-MCNC: 22 MG/DL (ref 20–40)
PROT SERPL-MCNC: 7.4 G/DL (ref 6.4–8.3)
RBC # BLD AUTO: 4.6 M/UL (ref 3.8–5.8)
SODIUM SERPL-SCNC: 138 MMOL/L (ref 132–146)
TRIGL SERPL-MCNC: 45 MG/DL
VIT B12 SERPL-MCNC: 1253 PG/ML (ref 211–946)
VLDLC SERPL CALC-MCNC: 9 MG/DL
WBC OTHER # BLD: 4.1 K/UL (ref 4.5–11.5)

## 2025-01-03 PROCEDURE — 82728 ASSAY OF FERRITIN: CPT

## 2025-01-03 PROCEDURE — 82746 ASSAY OF FOLIC ACID SERUM: CPT

## 2025-01-03 PROCEDURE — 84630 ASSAY OF ZINC: CPT

## 2025-01-03 PROCEDURE — 82525 ASSAY OF COPPER: CPT

## 2025-01-03 PROCEDURE — 82607 VITAMIN B-12: CPT

## 2025-01-03 PROCEDURE — 36415 COLL VENOUS BLD VENIPUNCTURE: CPT

## 2025-01-03 PROCEDURE — 85027 COMPLETE CBC AUTOMATED: CPT

## 2025-01-03 PROCEDURE — 84425 ASSAY OF VITAMIN B-1: CPT

## 2025-01-03 PROCEDURE — 80053 COMPREHEN METABOLIC PANEL: CPT

## 2025-01-03 PROCEDURE — 84590 ASSAY OF VITAMIN A: CPT

## 2025-01-03 PROCEDURE — 84134 ASSAY OF PREALBUMIN: CPT

## 2025-01-03 PROCEDURE — 80061 LIPID PANEL: CPT

## 2025-01-03 PROCEDURE — 82306 VITAMIN D 25 HYDROXY: CPT

## 2025-01-06 LAB
COPPER SERPL-MCNC: 95.2 UG/DL (ref 70–140)
RETINYL PALMITATE: <0.02 MG/L (ref 0–0.1)
VIT B1 PYROPHOSHATE BLD-SCNC: 185 NMOL/L (ref 70–180)
VITAMIN A LEVEL: 0.5 MG/L (ref 0.3–1.2)
VITAMIN A, INTERP: NORMAL
ZINC SERPL-MCNC: 55.9 UG/DL (ref 60–120)

## 2025-01-10 ENCOUNTER — PREP FOR PROCEDURE (OUTPATIENT)
Dept: BARIATRICS/WEIGHT MGMT | Age: 45
End: 2025-01-10

## 2025-01-10 ENCOUNTER — OFFICE VISIT (OUTPATIENT)
Dept: BARIATRICS/WEIGHT MGMT | Age: 45
End: 2025-01-10
Payer: COMMERCIAL

## 2025-01-10 VITALS
HEART RATE: 62 BPM | SYSTOLIC BLOOD PRESSURE: 116 MMHG | HEIGHT: 74 IN | DIASTOLIC BLOOD PRESSURE: 64 MMHG | WEIGHT: 184 LBS | BODY MASS INDEX: 23.61 KG/M2

## 2025-01-10 DIAGNOSIS — R47.02 DYSPHASIA: ICD-10-CM

## 2025-01-10 DIAGNOSIS — K91.2 MALNUTRITION FOLLOWING GASTROINTESTINAL SURGERY: Primary | ICD-10-CM

## 2025-01-10 DIAGNOSIS — R10.13 EPIGASTRIC PAIN: ICD-10-CM

## 2025-01-10 DIAGNOSIS — E60 ZINC DEFICIENCY: ICD-10-CM

## 2025-01-10 PROCEDURE — 99213 OFFICE O/P EST LOW 20 MIN: CPT | Performed by: NURSE PRACTITIONER

## 2025-01-10 RX ORDER — ZINC GLUCONATE 50 MG
50 TABLET ORAL
Qty: 12 TABLET | Refills: 1 | Status: SHIPPED | OUTPATIENT
Start: 2025-01-10 | End: 2025-03-11

## 2025-01-10 NOTE — PATIENT INSTRUCTIONS
Please continue to take your vitamin and mineral supplements as instructed.      If you received a blood work prescription today for laboratory monitoring due prior to your next routine follow-up visit, please have this blood work obtained 10 to 14 days prior to your next visit.  It is important to fast for 12 hours prior to routine weight loss surgery blood work, EXCEPT for drinking water, to ensure accuracy of results.    Please report nausea, vomiting, abdominal pain, or any other problems you experience to your surgeon.  For problems related to weight loss surgery, it is best to go to [facility] Emergency Department and have your surgeon paged.    Last Surgical Weight Loss:      1/24/2024     8:36 AM 1/10/2025     9:01 AM   Surgical Weight Loss Tracker   Consult Date 7/23/2021    Initial Height 6' 2\"    Initial Weight 434 lb    Initial BMI 55.72    Ideal Body Weight 197 lb    Surgery Date 12/14/2021    Pre-Surgical Height 6' 2\"    Pre-Surgical Weight 415 lb    Pre Surgery BMI 53.28    Weight to Lose 218 lb    Date 1/24/2024 1/10/2025   Height 6' 2\" 6' 2\"   Weight 188 lb 184 lb   BMI 24.14 23.62   Weight Change -227 lb -4 lb   Total Weight Change -227 lb -231 lb   % EBWL 104% 106%

## 2025-01-10 NOTE — PROGRESS NOTES
weight.    Prior to Admission medications    Medication Sig Start Date End Date Taking? Authorizing Provider   zinc gluconate 50 MG tablet Take 1 tablet by mouth three times a week 1/10/25 3/11/25 Yes Marisol Bhakta APRN - CNP   omeprazole (PRILOSEC) 20 MG delayed release capsule Take 1 capsule by mouth every morning (before breakfast) 1/10/25  Yes Marisol Bhakta APRN - CNP   sucralfate (CARAFATE) 1 GM tablet take 1/2 tablet by mouth four times a day 4/25/24  Yes Marisol Bhakta APRN - CNP   Calcium Carbonate (CALCI-CHEW PO) Take 1 tablet by mouth 3 times daily   Yes ProviderShavonne MD   Ferrous Sulfate (IRON PO) Take 1 tablet by mouth daily   Yes ProviderShavonne MD   Multiple Vitamin (MVI, BARIATRIC ADVANTAGE MULTI-FORMULA, CHEW TAB) Take 1 tablet by mouth 2 times daily   Yes ProviderShavonne MD   vitamin D (CHOLECALCIFEROL) 125 MCG (5000 UT) CAPS capsule Take 1 capsule by mouth daily   Yes ProviderShavonne MD        Allergies   Allergen Reactions    Adhesive Tape     Lactose Intolerance (Gi) Diarrhea     Pt avoids excessive milk or ice cream            Past Medical History:   Diagnosis Date    Irritable bowel syndrome     Morbid (severe) obesity due to excess calories     Obesity     Prehypertension     Tobacco use     quit 10/2020       Past Surgical History:   Procedure Laterality Date    CHOLECYSTECTOMY, LAPAROSCOPIC N/A 12/8/2022    LAPAROSCOPIC CHOLECYSTECTOMY WITH, INTRAOPERATIVE CHOLANGIOGRAM performed by Fahad Loaiza MD at [facility] OR    COLONOSCOPY  2009    normal    BRITTANY-EN-Y GASTRIC BYPASS N/A 12/14/2021    GASTRIC BYPASS BRITTANY-EN-Y LAPAROSCOPIC ROBOTIC XI performed by Fahad Loaiza MD at [facility] OR    UPPER GASTROINTESTINAL ENDOSCOPY N/A 8/24/2021    EGD BIOPSY performed by Fahad Loaiza MD at [facility] ENDOSCOPY       Current Outpatient Medications   Medication Sig Dispense Refill    zinc gluconate 50 MG tablet Take 1 tablet by mouth three times a week 12 tablet 1

## 2025-01-21 ENCOUNTER — TELEPHONE (OUTPATIENT)
Dept: BARIATRICS/WEIGHT MGMT | Age: 45
End: 2025-01-21

## 2025-01-22 NOTE — PROGRESS NOTES
[facility]                                                                                                                    PRE OP INSTRUCTIONS FOR  James Hartman        Date: 1/22/2025    Date of surgery: 1/23/25   Arrival Time: Hospital will call you between 5pm and 7pm with your final arrival time for surgery. Go to     front of hospital and check in at information desk.    Nothing by mouth (NPO) as instructed. May have clear liquids up to 2 hours prior to surgery. Nothing solid after midnight. Examples: water, apple juice, black coffee, plain tea    Take the following medications with a small sip of water on the morning of Surgery: omeprazole     Diabetics may take half the evening dose of insulin but none after midnight.  If you feel symptomatic or have low blood sugar morning of surgery drink 1-2 ounces of apple juice only. If you take a weekly insulin injection _______________, stop 7 days prior to surgery. If you take _______________, stop 3-4 days prior to surgery.    Aspirin, Ibuprofen, Advil, Naproxen, other Anti-inflammatory products should be stopped before surgery as directed by your surgeon, cardiologist, or primary care Doctor. Herbal supplements and Vitamin E should be stopped five days prior.  May take Tylenol unless instructed otherwise by your surgeon.    Check with your Doctor regarding stopping Plavix, Coumadin, Lovenox, Eliquis, Effient, or other blood thinners such as, pradaxa, lixiana, xaralto and savaysa.    Do not smoke, chew tobacco, vape, or use illicit drugs and do not drink any alcoholic beverages 24 hours prior to surgery.    You may brush your teeth the morning of surgery.      You MUST make arrangements for a responsible adult, 18 and over, to take you home after your surgery. You will not be allowed to leave alone or drive yourself home.  You will need someone stay with you the first 24 hrs. Your surgery will be cancelled if you do not have a ride home or

## 2025-01-23 ENCOUNTER — ANESTHESIA (OUTPATIENT)
Dept: ENDOSCOPY | Age: 45
End: 2025-01-23
Payer: COMMERCIAL

## 2025-01-23 ENCOUNTER — ANESTHESIA EVENT (OUTPATIENT)
Dept: ENDOSCOPY | Age: 45
End: 2025-01-23
Payer: COMMERCIAL

## 2025-01-23 ENCOUNTER — HOSPITAL ENCOUNTER (OUTPATIENT)
Age: 45
Setting detail: OUTPATIENT SURGERY
Discharge: HOME | End: 2025-01-23
Attending: SURGERY | Admitting: SURGERY
Payer: COMMERCIAL

## 2025-01-23 VITALS
HEART RATE: 76 BPM | DIASTOLIC BLOOD PRESSURE: 63 MMHG | RESPIRATION RATE: 20 BRPM | SYSTOLIC BLOOD PRESSURE: 106 MMHG | OXYGEN SATURATION: 100 % | TEMPERATURE: 97.5 F

## 2025-01-23 DIAGNOSIS — R47.02 DYSPHASIA: ICD-10-CM

## 2025-01-23 PROCEDURE — 7100000011 HC PHASE II RECOVERY - ADDTL 15 MIN: Performed by: SURGERY

## 2025-01-23 PROCEDURE — 88342 IMHCHEM/IMCYTCHM 1ST ANTB: CPT

## 2025-01-23 PROCEDURE — 6360000002 HC RX W HCPCS

## 2025-01-23 PROCEDURE — 2709999900 HC NON-CHARGEABLE SUPPLY: Performed by: SURGERY

## 2025-01-23 PROCEDURE — 3700000001 HC ADD 15 MINUTES (ANESTHESIA): Performed by: SURGERY

## 2025-01-23 PROCEDURE — 3609012400 HC EGD TRANSORAL BIOPSY SINGLE/MULTIPLE: Performed by: SURGERY

## 2025-01-23 PROCEDURE — 88305 TISSUE EXAM BY PATHOLOGIST: CPT

## 2025-01-23 PROCEDURE — 2580000003 HC RX 258

## 2025-01-23 PROCEDURE — 43239 EGD BIOPSY SINGLE/MULTIPLE: CPT | Performed by: SURGERY

## 2025-01-23 PROCEDURE — 7100000010 HC PHASE II RECOVERY - FIRST 15 MIN: Performed by: SURGERY

## 2025-01-23 PROCEDURE — 3700000000 HC ANESTHESIA ATTENDED CARE: Performed by: SURGERY

## 2025-01-23 RX ORDER — SODIUM CHLORIDE 9 MG/ML
INJECTION, SOLUTION INTRAVENOUS PRN
Status: DISCONTINUED | OUTPATIENT
Start: 2025-01-23 | End: 2025-01-23 | Stop reason: HOSPADM

## 2025-01-23 RX ORDER — SODIUM CHLORIDE 0.9 % (FLUSH) 0.9 %
5-40 SYRINGE (ML) INJECTION PRN
Status: DISCONTINUED | OUTPATIENT
Start: 2025-01-23 | End: 2025-01-23 | Stop reason: HOSPADM

## 2025-01-23 RX ORDER — SODIUM CHLORIDE, SODIUM LACTATE, POTASSIUM CHLORIDE, CALCIUM CHLORIDE 600; 310; 30; 20 MG/100ML; MG/100ML; MG/100ML; MG/100ML
INJECTION, SOLUTION INTRAVENOUS CONTINUOUS
Status: DISCONTINUED | OUTPATIENT
Start: 2025-01-23 | End: 2025-01-23 | Stop reason: HOSPADM

## 2025-01-23 RX ORDER — SODIUM CHLORIDE 0.9 % (FLUSH) 0.9 %
5-40 SYRINGE (ML) INJECTION EVERY 12 HOURS SCHEDULED
Status: DISCONTINUED | OUTPATIENT
Start: 2025-01-23 | End: 2025-01-23 | Stop reason: HOSPADM

## 2025-01-23 RX ORDER — LIDOCAINE HYDROCHLORIDE 10 MG/ML
INJECTION, SOLUTION INFILTRATION; PERINEURAL
Status: DISCONTINUED | OUTPATIENT
Start: 2025-01-23 | End: 2025-01-23 | Stop reason: SDUPTHER

## 2025-01-23 RX ORDER — PROPOFOL 10 MG/ML
INJECTION, EMULSION INTRAVENOUS
Status: DISCONTINUED | OUTPATIENT
Start: 2025-01-23 | End: 2025-01-23 | Stop reason: SDUPTHER

## 2025-01-23 RX ADMIN — SODIUM CHLORIDE, POTASSIUM CHLORIDE, SODIUM LACTATE AND CALCIUM CHLORIDE: 600; 310; 30; 20 INJECTION, SOLUTION INTRAVENOUS at 07:40

## 2025-01-23 RX ADMIN — PROPOFOL 50 MG: 10 INJECTION, EMULSION INTRAVENOUS at 07:42

## 2025-01-23 RX ADMIN — PROPOFOL 150 MG: 10 INJECTION, EMULSION INTRAVENOUS at 07:40

## 2025-01-23 RX ADMIN — LIDOCAINE HYDROCHLORIDE 50 MG: 10 INJECTION, SOLUTION INFILTRATION; PERINEURAL at 07:40

## 2025-01-23 ASSESSMENT — PAIN - FUNCTIONAL ASSESSMENT
PAIN_FUNCTIONAL_ASSESSMENT: 0-10
PAIN_FUNCTIONAL_ASSESSMENT: 0-10

## 2025-01-23 NOTE — OP NOTE
[facility]           Operative Report    DATE OF PROCEDURE: 1/23/2025    James Hartman    SURGEON: Fahad Loaiza MD.     ASSISTANT: none    PREOPERATIVE DIAGNOSES:  epigastric pain    POSTOPERATIVE DIAGNOSES:   (1) No Hiatal Hernia  (2) Mild grade A Esophagitis  (3) mild Gastritis  (4) 200 cc gastric pouch  (5) no marginal ulcer  (6) 2 cm anastomosis    OPERATION: [id number] with gastric biopsies    ANESTHESIA: LMAC    EBL: None    COMPLICATIONS: None.     History and consent:  This is a 44 y.o. year old male who is having epigastric pain with a history of RNYGB.  I have discussed with the patient the indication, alternatives, and the possible risks and/or complications of upper endoscopy and the conscious sedation anesthesia. The patient and/or family understands and agrees to proceed.    Monitoring and Safety:    The patient was placed on a cardiac monitor and vital signs, pulse oximetry and level of consciousness were continuously evaluated throughout the procedure. The patient was closely monitored until recovery from the medications was complete and the patient had returned to baseline status. Anesthesia was present at all times during the procedure.    OPERATIONS: The patient was placed on the table and sedated while blood pressure, pulse and pulse oximetry were continuously monitored by the anesthesia team. A bite block was placed prior to sedation and the patient was placed in the left lateral decubitus position. A lubricated scope was easily passed into the upper esophagus which looked normal. The distal esophagus looked abnormal grade A esophagitis. The scope was passed into the stomach and retroflexed. Hill grade I.  The gastroesophageal junction was at 40cm. There was no hiatal hernia. The scope was passed down toward the gastrojejunal anastomosis. The gastric mucosa looked abnormal: mild gastritis. Biopsy was taken to check for H. pylori.

## 2025-01-23 NOTE — DISCHARGE INSTRUCTIONS
Upper GI Endoscopy: What to Expect at Home  Your Recovery  You had an upper GI endoscopy. Your doctor used a thin, lighted tube that bends to look at the inside of your esophagus, your stomach, and the first part of the small intestine, called the duodenum.  After you have an endoscopy, you will stay at the hospital or clinic for 1 to 2 hours. This will allow the medicine to wear off. You will be able to go home after your doctor or nurse checks to make sure that you're not having any problems.  You may have to stay overnight if you had treatment during the test. You may have a sore throat for a day or two after the test.  This care sheet gives you a general idea about what to expect after the test.  How can you care for yourself at home?  Activity   Rest as much as you need to after you go home.  You should be able to go back to your usual activities the day after the test.  Diet   Follow your doctor's directions for eating after the test.  Drink plenty of fluids (unless your doctor has told you not to).  Medications   If you have a sore throat the day after the test, use an over-the-counter spray to numb your throat.  Follow-up care is a key part of your treatment and safety. Be sure to make and go to all appointments, and call your doctor if you are having problems. It's also a good idea to know your test results and keep a list of the medicines you take.  When should you call for help?   Call 911 anytime you think you may need emergency care. For example, call if:    You passed out (lost consciousness).     You have trouble breathing.     You pass maroon or bloody stools.   Call your doctor now or seek immediate medical care if:    You have pain that does not get better after your take pain medicine.     You have new or worse belly pain.     You have blood in your stools.     You are sick to your stomach and cannot keep fluids down.     You have a fever.     You cannot pass stools or gas.   Watch closely  for changes in your health, and be sure to contact your doctor if:    Your throat still hurts after a day or two.     You do not get better as expected.   Where can you learn more?  Go to https://www.[employer].net/patientEd and enter J454 to learn more about \"Upper GI Endoscopy: What to Expect at Home.\"  Current as of: October 19, 2023  Content Version: 14.3  © 2024 [employer].   Care instructions adapted under license by [employer]. If you have questions about a medical condition or this instruction, always ask your healthcare professional. [email or web address], disclaims any warranty or liability for your use of this information.

## 2025-01-23 NOTE — ANESTHESIA POSTPROCEDURE EVALUATION
Department of Anesthesiology  Postprocedure Note    Patient: James Hartman  MRN: [id number]  YOB: 1980  Date of evaluation: 1/23/2025    Procedure Summary       Date: 01/23/25 Room / Location: [place] [address] / [facility]    Anesthesia Start: 0733 Anesthesia Stop: 0748    Procedure: ESOPHAGOGASTRODUODENOSCOPY BIOPSY Diagnosis:       Dysphasia      (Dysphasia [R47.02])    Surgeons: Fahad Loaiza MD Responsible Provider: George Moody MD    Anesthesia Type: MAC ASA Status: 2            Anesthesia Type: MAC    Orestes Phase I: Orestes Score: 10    Orestes Phase II: Orestes Score: 10    Anesthesia Post Evaluation    Patient location during evaluation: PACU  Patient participation: complete - patient participated  Level of consciousness: awake and alert  Airway patency: patent  Nausea & Vomiting: no nausea and no vomiting  Cardiovascular status: hemodynamically stable  Respiratory status: acceptable  Hydration status: euvolemic  Pain management: satisfactory to patient    No notable events documented.

## 2025-01-23 NOTE — ANESTHESIA PRE PROCEDURE
Department of Anesthesiology  Preprocedure Note       Name:  James Hartman   Age:  44 y.o.  :  1980                                          MRN:  [id number]         Date:  2025      Surgeon: Surgeon(s):  Fahad Loaiza MD    Procedure: Procedure(s):  ESOPHAGOGASTRODUODENOSCOPY    Medications prior to admission:   Prior to Admission medications    Medication Sig Start Date End Date Taking? Authorizing Provider   zinc gluconate 50 MG tablet Take 1 tablet by mouth three times a week 1/10/25 3/11/25  Marisol Bhakta APRN - CNP   omeprazole (PRILOSEC) 20 MG delayed release capsule Take 1 capsule by mouth every morning (before breakfast) 1/10/25   Marisol Bhakta APRN - CNP   Calcium Carbonate (CALCI-CHEW PO) Take 1 tablet by mouth 3 times daily    Shavonne York MD   Ferrous Sulfate (IRON PO) Take 1 tablet by mouth daily    Shavonne York MD   Multiple Vitamin (MVI, BARIATRIC ADVANTAGE MULTI-FORMULA, CHEW TAB) Take 1 tablet by mouth daily    Shavonne York MD   vitamin D (CHOLECALCIFEROL) 125 MCG (5000 UT) CAPS capsule Take 1 capsule by mouth daily    Shavonne York MD       Current medications:    Current Facility-Administered Medications   Medication Dose Route Frequency Provider Last Rate Last Admin    sodium chloride flush 0.9 % injection 5-40 mL  5-40 mL IntraVENous 2 times per day Shaheen Rangel APRN - CNP        sodium chloride flush 0.9 % injection 5-40 mL  5-40 mL IntraVENous PRN Shaheen Rangel APRN - CNP        0.9 % sodium chloride infusion   IntraVENous PRN Shaheen Rangel APRN - CNP        lactated ringers infusion   IntraVENous Continuous Shaheen Rangel APRN - CNP           Allergies:    Allergies   Allergen Reactions    Adhesive Tape     Lactose Intolerance (Gi) Diarrhea     Pt avoids excessive milk or ice cream        Problem List:    Patient Active Problem List   Diagnosis Code    Irritable bowel syndrome K58.9    Obesity E66.9    Tobacco use Z72.0

## 2025-01-23 NOTE — H&P
James Hartman  1/23/2025  [facility]    Devika-en- Y Gastric Bypass  3 year Post-Operative Follow-up   HISTORY AND PHYSICAL    No chief complaint on file.      James Hartman is a 44 y.o. male who is 3 years  post Laparoscopic Devika-en-Y Gastric Bypass surgery.  Reports that he is doing good. He does report that he has been having issues at times keeping food and fluids down. Feels like there is a lump in his throat at times. He also reports that he has a lot of gas and is burping a lot.  He has a hard time eating chicken and other meats. Does not tolerate protein shakes. He is not having swallowing difficulty. Patient denies nausea and vomiting. Patient reports occasional gastric reflux symptoms, has been taking OTC omeprazole. Bowel movements are normal per patient. Patient is not taking fiber supplements.     Patient is compliant most of the time with the bariatric one a day MVI. He is meeting fluid recommendations of at least 64 ounces per day and is not meeting protein recommendations. He  is exercising:  lifting weights, walking .     Last Surgical Weight Loss:      1/24/2024     8:36 AM 1/10/2025     9:01 AM   Surgical Weight Loss Tracker   Consult Date 7/23/2021    Initial Height 6' 2\"    Initial Weight 434 lb    Initial BMI 55.72    Ideal Body Weight 197 lb    Surgery Date 12/14/2021    Pre-Surgical Height 6' 2\"    Pre-Surgical Weight 415 lb    Pre Surgery BMI 53.28    Weight to Lose 218 lb    Date 1/24/2024 1/10/2025   Height 6' 2\" 6' 2\"   Weight 188 lb 184 lb   BMI 24.14 23.62   Weight Change -227 lb -4 lb   Total Weight Change -227 lb -231 lb   % EBWL 104% 106%         Weight=83.5 kg (184 lb)  Today's weight represents a total weight loss of 231 pounds since surgery with 0 pounds regained since the lowest weight.    Prior to Admission medications    Medication Sig Start Date End Date Taking? Authorizing Provider   zinc gluconate 50 MG tablet Take 1 tablet by mouth three times a  omeprazole (PRILOSEC) 20 MG delayed release capsule; Take 1 capsule by mouth every morning (before breakfast)  Dispense: 30 capsule; Refill: 2    3. Zinc deficiency    - zinc gluconate 50 MG tablet; Take 1 tablet by mouth three times a week  Dispense: 12 tablet; Refill: 1  - Zinc; Future        Plan:  Continue to eat a high protein, low calorie diet, eat small portions very slowly and chew well before swallowing.  Drink plenty of water and fluids. Make sure to use fiber to keep the bowels regular. Try to exercise 7 days per week, maintain adequate variety and balance. Always notify the clinic if you have any medical problems. Follow up after EGD, sooner if needed.      Physician Signature: Electronically signed by.me

## 2025-01-27 LAB — SURGICAL PATHOLOGY REPORT: NORMAL

## 2025-02-01 DIAGNOSIS — R10.13 EPIGASTRIC PAIN: ICD-10-CM

## 2025-02-07 ENCOUNTER — PREP FOR PROCEDURE (OUTPATIENT)
Dept: BARIATRICS/WEIGHT MGMT | Age: 45
End: 2025-02-07

## 2025-02-07 ENCOUNTER — OFFICE VISIT (OUTPATIENT)
Dept: BARIATRICS/WEIGHT MGMT | Age: 45
End: 2025-02-07
Payer: COMMERCIAL

## 2025-02-07 VITALS
HEIGHT: 74 IN | RESPIRATION RATE: 20 BRPM | HEART RATE: 60 BPM | BODY MASS INDEX: 24.26 KG/M2 | DIASTOLIC BLOOD PRESSURE: 75 MMHG | TEMPERATURE: 98.2 F | WEIGHT: 189 LBS | SYSTOLIC BLOOD PRESSURE: 138 MMHG

## 2025-02-07 DIAGNOSIS — K45.8 INTERNAL HERNIA: ICD-10-CM

## 2025-02-07 DIAGNOSIS — R10.12 LUQ PAIN: Primary | ICD-10-CM

## 2025-02-07 DIAGNOSIS — R10.12 LUQ ABDOMINAL PAIN: ICD-10-CM

## 2025-02-07 PROCEDURE — 99213 OFFICE O/P EST LOW 20 MIN: CPT | Performed by: SURGERY

## 2025-02-07 NOTE — PROGRESS NOTES
James Hartman  2/7/2025  [facility]    Devika-en- Y Gastric Bypass  3 year Post-Operative Follow-up   HISTORY AND PHYSICAL     Chief complaint: LUQ pain, bloating        James Hartman is a 44 y.o. male who is 3 years  post Laparoscopic Devika-en-Y Gastric Bypass surgery.  Reports that he is doing good. He does report that he has been having issues at times keeping food and fluids down. Feels like there is a lump in his throat at times. He also reports that he has a lot of gas and is burping a lot.  He has a hard time eating chicken and other meats. Does not tolerate protein shakes. He is not having swallowing difficulty. Patient denies nausea and vomiting. Patient reports occasional gastric reflux symptoms, has been taking OTC omeprazole. Bowel movements are normal per patient. Patient is not taking fiber supplements.      Patient is compliant most of the time with the bariatric one a day MVI. He is meeting fluid recommendations of at least 64 ounces per day and is not meeting protein recommendations. He  is exercising:  lifting weights, walking .     Returns after EGD  POSTOPERATIVE DIAGNOSES:   (1) No Hiatal Hernia  (2) Mild grade A Esophagitis  (3) mild Gastritis  (4) 200 cc gastric pouch  (5) no marginal ulcer  (6) 2 cm anastomosis    Last Surgical Weight Loss:      1/24/2024     8:36 AM 1/10/2025     9:01 AM 2/7/2025     9:30 AM   Surgical Weight Loss Tracker   Consult Date 7/23/2021     Initial Height 6' 2\"     Initial Weight 434 lb     Initial BMI 55.72     Ideal Body Weight 197 lb     Surgery Date 12/14/2021     Pre-Surgical Height 6' 2\"     Pre-Surgical Weight 415 lb     Pre Surgery BMI 53.28     Weight to Lose 218 lb     Date 1/24/2024 1/10/2025 2/7/2025   Height 6' 2\" 6' 2\" 6' 2\"   Weight 188 lb 184 lb 189 lb   BMI 24.14 23.62 24.26   Weight Change -227 lb -4 lb 5 lb   Total Weight Change -227 lb -231 lb -226 lb   % EBWL 104% 106% 104%                Weight=83.5 kg (184 lb)  Today's

## 2025-02-07 NOTE — PATIENT INSTRUCTIONS
Please continue to take your vitamin and mineral supplements as instructed.      If you received a blood work prescription today for laboratory monitoring due prior to your next routine follow-up visit, please have this blood work obtained 10 to 14 days prior to your next visit.  It is important to fast for 12 hours prior to routine weight loss surgery blood work, EXCEPT for drinking water, to ensure accuracy of results.    Please report nausea, vomiting, abdominal pain, or any other problems you experience to your surgeon.  For problems related to weight loss surgery, it is best to go to [facility] Emergency Department and have your surgeon paged.    Last Surgical Weight Loss:      1/24/2024     8:36 AM 1/10/2025     9:01 AM 2/7/2025     9:30 AM   Surgical Weight Loss Tracker   Consult Date 7/23/2021     Initial Height 6' 2\"     Initial Weight 434 lb     Initial BMI 55.72     Ideal Body Weight 197 lb     Surgery Date 12/14/2021     Pre-Surgical Height 6' 2\"     Pre-Surgical Weight 415 lb     Pre Surgery BMI 53.28     Weight to Lose 218 lb     Date 1/24/2024 1/10/2025 2/7/2025   Height 6' 2\" 6' 2\" 6' 2\"   Weight 188 lb 184 lb 189 lb   BMI 24.14 23.62 24.26   Weight Change -227 lb -4 lb 5 lb   Total Weight Change -227 lb -231 lb -226 lb   % EBWL 104% 106% 104%

## 2025-02-13 RX ORDER — SODIUM CHLORIDE, SODIUM LACTATE, POTASSIUM CHLORIDE, CALCIUM CHLORIDE 600; 310; 30; 20 MG/100ML; MG/100ML; MG/100ML; MG/100ML
INJECTION, SOLUTION INTRAVENOUS CONTINUOUS
OUTPATIENT
Start: 2025-02-25

## 2025-02-14 RX ORDER — SODIUM CHLORIDE, SODIUM LACTATE, POTASSIUM CHLORIDE, CALCIUM CHLORIDE 600; 310; 30; 20 MG/100ML; MG/100ML; MG/100ML; MG/100ML
INJECTION, SOLUTION INTRAVENOUS CONTINUOUS
Status: CANCELLED | OUTPATIENT
Start: 2025-02-14

## 2025-02-14 RX ORDER — SODIUM CHLORIDE 0.9 % (FLUSH) 0.9 %
5-40 SYRINGE (ML) INJECTION PRN
Status: CANCELLED | OUTPATIENT
Start: 2025-02-14

## 2025-02-14 RX ORDER — SODIUM CHLORIDE 0.9 % (FLUSH) 0.9 %
5-40 SYRINGE (ML) INJECTION EVERY 12 HOURS SCHEDULED
Status: CANCELLED | OUTPATIENT
Start: 2025-02-14

## 2025-02-14 RX ORDER — SODIUM CHLORIDE 9 MG/ML
INJECTION, SOLUTION INTRAVENOUS PRN
Status: CANCELLED | OUTPATIENT
Start: 2025-02-14

## 2025-02-17 ENCOUNTER — HOSPITAL ENCOUNTER (OUTPATIENT)
Dept: PREADMISSION TESTING | Age: 45
Discharge: HOME | End: 2025-02-17
Payer: COMMERCIAL

## 2025-02-17 DIAGNOSIS — Z01.818 PRE-OP TESTING: Primary | ICD-10-CM

## 2025-02-17 LAB
BASOPHILS # BLD: 0.04 K/UL (ref 0–0.2)
BASOPHILS NFR BLD: 1 % (ref 0–2)
EOSINOPHIL # BLD: 0.11 K/UL (ref 0.05–0.5)
EOSINOPHILS RELATIVE PERCENT: 2 % (ref 0–6)
ERYTHROCYTE [DISTWIDTH] IN BLOOD BY AUTOMATED COUNT: 12.5 % (ref 11.5–15)
HCT VFR BLD AUTO: 41 % (ref 37–54)
HGB BLD-MCNC: 14 G/DL (ref 12.5–16.5)
IMM GRANULOCYTES # BLD AUTO: <0.03 K/UL (ref 0–0.58)
IMM GRANULOCYTES NFR BLD: 0 % (ref 0–5)
LYMPHOCYTES NFR BLD: 1.86 K/UL (ref 1.5–4)
LYMPHOCYTES RELATIVE PERCENT: 37 % (ref 20–42)
MCH RBC QN AUTO: 31.5 PG (ref 26–35)
MCHC RBC AUTO-ENTMCNC: 34.1 G/DL (ref 32–34.5)
MCV RBC AUTO: 92.3 FL (ref 80–99.9)
MONOCYTES NFR BLD: 0.53 K/UL (ref 0.1–0.95)
MONOCYTES NFR BLD: 11 % (ref 2–12)
NEUTROPHILS NFR BLD: 50 % (ref 43–80)
NEUTS SEG NFR BLD: 2.49 K/UL (ref 1.8–7.3)
PLATELET # BLD AUTO: 180 K/UL (ref 130–450)
PMV BLD AUTO: 11.4 FL (ref 7–12)
RBC # BLD AUTO: 4.44 M/UL (ref 3.8–5.8)
WBC OTHER # BLD: 5 K/UL (ref 4.5–11.5)

## 2025-02-17 PROCEDURE — 87081 CULTURE SCREEN ONLY: CPT

## 2025-02-17 PROCEDURE — 85025 COMPLETE CBC W/AUTO DIFF WBC: CPT

## 2025-02-17 NOTE — PROGRESS NOTES
[facility]                                                                                                                    PRE OP INSTRUCTIONS FOR  James Hartman        Date: 2/17/2025    Date of surgery: 2/25/25   Arrival Time: Hospital will call you the night before, between 5pm and 7pm with your final arrival time for surgery. Go to     front of hospital and check in at information desk.    Nothing by mouth (NPO) as instructed. May have clear liquids up to 2 hours prior to surgery. Nothing solid after midnight. Examples: water, apple juice, black coffee, plain tea   follow any dr bourgeois office  instructions     Take the following medications with a small sip of water on the morning of Surgery:  omeprazole    Diabetics may take half the evening dose of insulin but none after midnight.  If you feel symptomatic or have low blood sugar morning of surgery drink 1-2 ounces of apple juice only. If you take a weekly insulin injection _______________, stop 7 days prior to surgery. If you take _______________, stop 3-4 days prior to surgery.    Aspirin, Ibuprofen, Advil, Naproxen, other Anti-inflammatory products should be stopped before surgery as directed by your surgeon, cardiologist, or primary care Doctor. Herbal supplements and Vitamin E should be stopped five days prior.  May take Tylenol unless instructed otherwise by your surgeon.    Check with your Doctor regarding stopping Plavix, Coumadin, Lovenox, Eliquis, Effient, or other blood thinners such as, pradaxa, lixiana, xaralto and savaysa.    Do not smoke, chew tobacco, vape, or use illicit drugs and do not drink any alcoholic beverages 24 hours prior to surgery.    You may brush your teeth the morning of surgery.      You MUST make arrangements for a responsible adult, 18 and over, to take you home after your surgery. You will not be allowed to leave alone or drive yourself home.  You will need someone stay with you the first 24 hrs.

## 2025-02-19 LAB
MICROORGANISM SPEC CULT: NORMAL
SERVICE CMNT-IMP: NORMAL
SPECIMEN DESCRIPTION: NORMAL

## 2025-02-20 ENCOUNTER — TELEPHONE (OUTPATIENT)
Dept: BARIATRICS/WEIGHT MGMT | Age: 45
End: 2025-02-20

## 2025-02-24 ENCOUNTER — ANESTHESIA EVENT (OUTPATIENT)
Dept: OPERATING ROOM | Age: 45
End: 2025-02-24
Payer: COMMERCIAL

## 2025-02-25 ENCOUNTER — HOSPITAL ENCOUNTER (OUTPATIENT)
Age: 45
Setting detail: OUTPATIENT SURGERY
Discharge: HOME | End: 2025-02-25
Attending: SURGERY | Admitting: SURGERY
Payer: COMMERCIAL

## 2025-02-25 ENCOUNTER — ANESTHESIA (OUTPATIENT)
Dept: OPERATING ROOM | Age: 45
End: 2025-02-25
Payer: COMMERCIAL

## 2025-02-25 VITALS
HEIGHT: 74 IN | RESPIRATION RATE: 20 BRPM | BODY MASS INDEX: 24 KG/M2 | WEIGHT: 187 LBS | TEMPERATURE: 98.2 F | DIASTOLIC BLOOD PRESSURE: 79 MMHG | SYSTOLIC BLOOD PRESSURE: 132 MMHG | OXYGEN SATURATION: 96 % | HEART RATE: 58 BPM

## 2025-02-25 DIAGNOSIS — G89.18 POSTOPERATIVE PAIN: Primary | ICD-10-CM

## 2025-02-25 PROCEDURE — 2580000003 HC RX 258: Performed by: ANESTHESIOLOGY

## 2025-02-25 PROCEDURE — 7100000000 HC PACU RECOVERY - FIRST 15 MIN: Performed by: SURGERY

## 2025-02-25 PROCEDURE — 7100000001 HC PACU RECOVERY - ADDTL 15 MIN: Performed by: SURGERY

## 2025-02-25 PROCEDURE — 7100000011 HC PHASE II RECOVERY - ADDTL 15 MIN: Performed by: SURGERY

## 2025-02-25 PROCEDURE — 2500000003 HC RX 250 WO HCPCS

## 2025-02-25 PROCEDURE — 2709999900 HC NON-CHARGEABLE SUPPLY: Performed by: SURGERY

## 2025-02-25 PROCEDURE — 6360000002 HC RX W HCPCS

## 2025-02-25 PROCEDURE — 3600000014 HC SURGERY LEVEL 4 ADDTL 15MIN: Performed by: SURGERY

## 2025-02-25 PROCEDURE — 6370000000 HC RX 637 (ALT 250 FOR IP): Performed by: ANESTHESIOLOGY

## 2025-02-25 PROCEDURE — 7100000010 HC PHASE II RECOVERY - FIRST 15 MIN: Performed by: SURGERY

## 2025-02-25 PROCEDURE — 2500000003 HC RX 250 WO HCPCS: Performed by: SURGERY

## 2025-02-25 PROCEDURE — 44050 REDUCE BOWEL OBSTRUCTION: CPT | Performed by: SURGERY

## 2025-02-25 PROCEDURE — 6360000002 HC RX W HCPCS: Performed by: ANESTHESIOLOGY

## 2025-02-25 PROCEDURE — 3700000000 HC ANESTHESIA ATTENDED CARE: Performed by: SURGERY

## 2025-02-25 PROCEDURE — 3700000001 HC ADD 15 MINUTES (ANESTHESIA): Performed by: SURGERY

## 2025-02-25 PROCEDURE — 3600000004 HC SURGERY LEVEL 4 BASE: Performed by: SURGERY

## 2025-02-25 PROCEDURE — 2720000010 HC SURG SUPPLY STERILE: Performed by: SURGERY

## 2025-02-25 PROCEDURE — 44238 UNLISTED LAPS PX INTESTINE: CPT | Performed by: SURGERY

## 2025-02-25 RX ORDER — PROCHLORPERAZINE EDISYLATE 5 MG/ML
5 INJECTION INTRAMUSCULAR; INTRAVENOUS
Status: DISCONTINUED | OUTPATIENT
Start: 2025-02-25 | End: 2025-02-25 | Stop reason: HOSPADM

## 2025-02-25 RX ORDER — OXYCODONE AND ACETAMINOPHEN 5; 325 MG/1; MG/1
1 TABLET ORAL EVERY 6 HOURS PRN
Qty: 10 TABLET | Refills: 0 | Status: SHIPPED | OUTPATIENT
Start: 2025-02-25 | End: 2025-02-28

## 2025-02-25 RX ORDER — DIPHENHYDRAMINE HYDROCHLORIDE 50 MG/ML
12.5 INJECTION INTRAMUSCULAR; INTRAVENOUS
Status: DISCONTINUED | OUTPATIENT
Start: 2025-02-25 | End: 2025-02-25 | Stop reason: HOSPADM

## 2025-02-25 RX ORDER — FENTANYL CITRATE 50 UG/ML
INJECTION, SOLUTION INTRAMUSCULAR; INTRAVENOUS
Status: DISCONTINUED | OUTPATIENT
Start: 2025-02-25 | End: 2025-02-25 | Stop reason: SDUPTHER

## 2025-02-25 RX ORDER — SODIUM CHLORIDE 9 MG/ML
INJECTION, SOLUTION INTRAVENOUS PRN
Status: DISCONTINUED | OUTPATIENT
Start: 2025-02-25 | End: 2025-02-25 | Stop reason: HOSPADM

## 2025-02-25 RX ORDER — OXYCODONE AND ACETAMINOPHEN 5; 325 MG/1; MG/1
1 TABLET ORAL ONCE
Status: COMPLETED | OUTPATIENT
Start: 2025-02-25 | End: 2025-02-25

## 2025-02-25 RX ORDER — MIDAZOLAM HYDROCHLORIDE 1 MG/ML
INJECTION, SOLUTION INTRAMUSCULAR; INTRAVENOUS
Status: DISCONTINUED | OUTPATIENT
Start: 2025-02-25 | End: 2025-02-25 | Stop reason: SDUPTHER

## 2025-02-25 RX ORDER — SODIUM CHLORIDE 0.9 % (FLUSH) 0.9 %
5-40 SYRINGE (ML) INJECTION EVERY 12 HOURS SCHEDULED
Status: DISCONTINUED | OUTPATIENT
Start: 2025-02-25 | End: 2025-02-25 | Stop reason: HOSPADM

## 2025-02-25 RX ORDER — DROPERIDOL 2.5 MG/ML
0.62 INJECTION, SOLUTION INTRAMUSCULAR; INTRAVENOUS
Status: DISCONTINUED | OUTPATIENT
Start: 2025-02-25 | End: 2025-02-25 | Stop reason: HOSPADM

## 2025-02-25 RX ORDER — MEPERIDINE HYDROCHLORIDE 25 MG/ML
12.5 INJECTION INTRAMUSCULAR; INTRAVENOUS; SUBCUTANEOUS EVERY 5 MIN PRN
Status: DISCONTINUED | OUTPATIENT
Start: 2025-02-25 | End: 2025-02-25 | Stop reason: HOSPADM

## 2025-02-25 RX ORDER — PROPOFOL 10 MG/ML
INJECTION, EMULSION INTRAVENOUS
Status: DISCONTINUED | OUTPATIENT
Start: 2025-02-25 | End: 2025-02-25 | Stop reason: SDUPTHER

## 2025-02-25 RX ORDER — METHOCARBAMOL 750 MG/1
750 TABLET, FILM COATED ORAL 4 TIMES DAILY
Qty: 40 TABLET | Refills: 0 | Status: SHIPPED | OUTPATIENT
Start: 2025-02-25 | End: 2025-03-07

## 2025-02-25 RX ORDER — MIDAZOLAM HYDROCHLORIDE 1 MG/ML
2 INJECTION, SOLUTION INTRAMUSCULAR; INTRAVENOUS
Status: DISCONTINUED | OUTPATIENT
Start: 2025-02-25 | End: 2025-02-25 | Stop reason: HOSPADM

## 2025-02-25 RX ORDER — BUPIVACAINE HYDROCHLORIDE AND EPINEPHRINE 2.5; 5 MG/ML; UG/ML
INJECTION, SOLUTION EPIDURAL; INFILTRATION; INTRACAUDAL; PERINEURAL PRN
Status: DISCONTINUED | OUTPATIENT
Start: 2025-02-25 | End: 2025-02-25 | Stop reason: ALTCHOICE

## 2025-02-25 RX ORDER — SODIUM CHLORIDE, SODIUM LACTATE, POTASSIUM CHLORIDE, CALCIUM CHLORIDE 600; 310; 30; 20 MG/100ML; MG/100ML; MG/100ML; MG/100ML
INJECTION, SOLUTION INTRAVENOUS CONTINUOUS
Status: DISCONTINUED | OUTPATIENT
Start: 2025-02-25 | End: 2025-02-25 | Stop reason: HOSPADM

## 2025-02-25 RX ORDER — DEXAMETHASONE SODIUM PHOSPHATE 10 MG/ML
INJECTION, SOLUTION INTRAMUSCULAR; INTRAVENOUS
Status: DISCONTINUED | OUTPATIENT
Start: 2025-02-25 | End: 2025-02-25 | Stop reason: SDUPTHER

## 2025-02-25 RX ORDER — IPRATROPIUM BROMIDE AND ALBUTEROL SULFATE 2.5; .5 MG/3ML; MG/3ML
1 SOLUTION RESPIRATORY (INHALATION)
Status: DISCONTINUED | OUTPATIENT
Start: 2025-02-25 | End: 2025-02-25 | Stop reason: HOSPADM

## 2025-02-25 RX ORDER — SODIUM CHLORIDE 0.9 % (FLUSH) 0.9 %
5-40 SYRINGE (ML) INJECTION PRN
Status: DISCONTINUED | OUTPATIENT
Start: 2025-02-25 | End: 2025-02-25 | Stop reason: HOSPADM

## 2025-02-25 RX ORDER — METHOCARBAMOL 100 MG/ML
1000 INJECTION, SOLUTION INTRAMUSCULAR; INTRAVENOUS
Status: COMPLETED | OUTPATIENT
Start: 2025-02-25 | End: 2025-02-25

## 2025-02-25 RX ORDER — DOCUSATE SODIUM 100 MG/1
100 CAPSULE, LIQUID FILLED ORAL 2 TIMES DAILY
Qty: 30 CAPSULE | Refills: 0 | Status: SHIPPED | OUTPATIENT
Start: 2025-02-25 | End: 2025-03-12

## 2025-02-25 RX ORDER — ROCURONIUM BROMIDE 10 MG/ML
INJECTION, SOLUTION INTRAVENOUS
Status: DISCONTINUED | OUTPATIENT
Start: 2025-02-25 | End: 2025-02-25 | Stop reason: SDUPTHER

## 2025-02-25 RX ORDER — LABETALOL HYDROCHLORIDE 5 MG/ML
10 INJECTION, SOLUTION INTRAVENOUS
Status: DISCONTINUED | OUTPATIENT
Start: 2025-02-25 | End: 2025-02-25 | Stop reason: HOSPADM

## 2025-02-25 RX ORDER — ONDANSETRON 4 MG/1
4 TABLET, ORALLY DISINTEGRATING ORAL EVERY 8 HOURS PRN
Qty: 20 TABLET | Refills: 1 | Status: SHIPPED | OUTPATIENT
Start: 2025-02-25

## 2025-02-25 RX ORDER — NALOXONE HYDROCHLORIDE 0.4 MG/ML
INJECTION, SOLUTION INTRAMUSCULAR; INTRAVENOUS; SUBCUTANEOUS PRN
Status: DISCONTINUED | OUTPATIENT
Start: 2025-02-25 | End: 2025-02-25 | Stop reason: HOSPADM

## 2025-02-25 RX ORDER — HYDRALAZINE HYDROCHLORIDE 20 MG/ML
10 INJECTION INTRAMUSCULAR; INTRAVENOUS
Status: DISCONTINUED | OUTPATIENT
Start: 2025-02-25 | End: 2025-02-25 | Stop reason: HOSPADM

## 2025-02-25 RX ORDER — KETOROLAC TROMETHAMINE 30 MG/ML
30 INJECTION, SOLUTION INTRAMUSCULAR; INTRAVENOUS ONCE
Status: COMPLETED | OUTPATIENT
Start: 2025-02-25 | End: 2025-02-25

## 2025-02-25 RX ORDER — FENTANYL CITRATE 0.05 MG/ML
25 INJECTION, SOLUTION INTRAMUSCULAR; INTRAVENOUS EVERY 5 MIN PRN
Status: COMPLETED | OUTPATIENT
Start: 2025-02-25 | End: 2025-02-25

## 2025-02-25 RX ORDER — LIDOCAINE HYDROCHLORIDE 20 MG/ML
INJECTION, SOLUTION INTRAVENOUS
Status: DISCONTINUED | OUTPATIENT
Start: 2025-02-25 | End: 2025-02-25 | Stop reason: SDUPTHER

## 2025-02-25 RX ORDER — ONDANSETRON 2 MG/ML
INJECTION INTRAMUSCULAR; INTRAVENOUS
Status: DISCONTINUED | OUTPATIENT
Start: 2025-02-25 | End: 2025-02-25 | Stop reason: SDUPTHER

## 2025-02-25 RX ADMIN — HYDROMORPHONE HYDROCHLORIDE 0.5 MG: 1 INJECTION, SOLUTION INTRAMUSCULAR; INTRAVENOUS; SUBCUTANEOUS at 10:02

## 2025-02-25 RX ADMIN — KETOROLAC TROMETHAMINE 30 MG: 30 INJECTION, SOLUTION INTRAMUSCULAR; INTRAVENOUS at 10:08

## 2025-02-25 RX ADMIN — DEXAMETHASONE SODIUM PHOSPHATE 10 MG: 10 INJECTION, SOLUTION INTRAMUSCULAR; INTRAVENOUS at 09:07

## 2025-02-25 RX ADMIN — HYDROMORPHONE HYDROCHLORIDE 0.5 MG: 1 INJECTION, SOLUTION INTRAMUSCULAR; INTRAVENOUS; SUBCUTANEOUS at 09:52

## 2025-02-25 RX ADMIN — MIDAZOLAM 2 MG: 1 INJECTION INTRAMUSCULAR; INTRAVENOUS at 08:42

## 2025-02-25 RX ADMIN — OXYCODONE AND ACETAMINOPHEN 1 TABLET: 5; 325 TABLET ORAL at 11:50

## 2025-02-25 RX ADMIN — ROCURONIUM BROMIDE 40 MG: 10 INJECTION, SOLUTION INTRAVENOUS at 08:53

## 2025-02-25 RX ADMIN — FENTANYL CITRATE 25 MCG: 0.05 INJECTION, SOLUTION INTRAMUSCULAR; INTRAVENOUS at 10:23

## 2025-02-25 RX ADMIN — PROPOFOL 200 MG: 10 INJECTION, EMULSION INTRAVENOUS at 08:53

## 2025-02-25 RX ADMIN — FENTANYL CITRATE 100 MCG: 50 INJECTION, SOLUTION INTRAMUSCULAR; INTRAVENOUS at 08:53

## 2025-02-25 RX ADMIN — FENTANYL CITRATE 50 MCG: 50 INJECTION, SOLUTION INTRAMUSCULAR; INTRAVENOUS at 09:15

## 2025-02-25 RX ADMIN — FENTANYL CITRATE 50 MCG: 50 INJECTION, SOLUTION INTRAMUSCULAR; INTRAVENOUS at 09:22

## 2025-02-25 RX ADMIN — LIDOCAINE HYDROCHLORIDE 80 MG: 20 INJECTION, SOLUTION INTRAVENOUS at 08:53

## 2025-02-25 RX ADMIN — ROCURONIUM BROMIDE 20 MG: 10 INJECTION, SOLUTION INTRAVENOUS at 09:11

## 2025-02-25 RX ADMIN — HYDROMORPHONE HYDROCHLORIDE 0.5 MG: 1 INJECTION, SOLUTION INTRAMUSCULAR; INTRAVENOUS; SUBCUTANEOUS at 09:31

## 2025-02-25 RX ADMIN — SODIUM CHLORIDE, POTASSIUM CHLORIDE, SODIUM LACTATE AND CALCIUM CHLORIDE: 600; 310; 30; 20 INJECTION, SOLUTION INTRAVENOUS at 07:27

## 2025-02-25 RX ADMIN — SUGAMMADEX 250 MG: 100 INJECTION, SOLUTION INTRAVENOUS at 09:33

## 2025-02-25 RX ADMIN — METHOCARBAMOL 1000 MG: 100 INJECTION INTRAMUSCULAR; INTRAVENOUS at 09:55

## 2025-02-25 RX ADMIN — CEFAZOLIN 2000 MG: 1 INJECTION, POWDER, FOR SOLUTION INTRAMUSCULAR; INTRAVENOUS at 08:49

## 2025-02-25 RX ADMIN — ONDANSETRON 4 MG: 2 INJECTION, SOLUTION INTRAMUSCULAR; INTRAVENOUS at 09:32

## 2025-02-25 RX ADMIN — FENTANYL CITRATE 25 MCG: 0.05 INJECTION, SOLUTION INTRAMUSCULAR; INTRAVENOUS at 10:31

## 2025-02-25 ASSESSMENT — PAIN DESCRIPTION - DESCRIPTORS
DESCRIPTORS: STABBING;ACHING
DESCRIPTORS: STABBING;ACHING
DESCRIPTORS: ACHING
DESCRIPTORS: ACHING;STABBING
DESCRIPTORS: CRAMPING;ACHING
DESCRIPTORS: SORE
DESCRIPTORS: CRAMPING;ACHING
DESCRIPTORS: SORE
DESCRIPTORS: SORE

## 2025-02-25 ASSESSMENT — PAIN DESCRIPTION - LOCATION
LOCATION: ABDOMEN

## 2025-02-25 ASSESSMENT — PAIN SCALES - GENERAL
PAINLEVEL_OUTOF10: 9
PAINLEVEL_OUTOF10: 10
PAINLEVEL_OUTOF10: 4
PAINLEVEL_OUTOF10: 5
PAINLEVEL_OUTOF10: 8
PAINLEVEL_OUTOF10: 7
PAINLEVEL_OUTOF10: 5

## 2025-02-25 ASSESSMENT — PAIN - FUNCTIONAL ASSESSMENT
PAIN_FUNCTIONAL_ASSESSMENT: 0-10

## 2025-02-25 ASSESSMENT — LIFESTYLE VARIABLES: SMOKING_STATUS: 1

## 2025-02-25 NOTE — PROGRESS NOTES
CLINICAL PHARMACY NOTE: MEDS TO BEDS    Total # of Prescriptions Filled: 4   The following medications were delivered to the patient:  Percocet 5-325 mg  Docusate 100 mg  Ondansetron 4 mg ODT  Methocarbamol 750 mg    Additional Documentation:

## 2025-02-25 NOTE — OP NOTE
Operative Note      Patient: James Hartman  YOB: 1980  MRN: [id number]    Date of Procedure: 2/25/2025    Pre-Op Diagnosis Codes:      * LUQ abdominal pain [R10.12]    Post-Op Diagnosis: Same       Procedure(s):  LAPAROSCOPY DIAGNOSTIC LYSIS OF ADHESIONS AND CLOSURE OF INTERNAL HERNIA    Surgeon(s):  Fahad Loaiza MD    Assistant:   First Assistant: Anil Nobles Joseph M, RN  Resident: Keke Wiggins MD    Anesthesia: General    Estimated Blood Loss (mL): Minimal    Complications: None    Specimens:   * No specimens in log *    Implants:  * No implants in log *      Drains: * No LDAs found *    Findings:  Infection Present At Time Of Surgery (PATOS) (choose all levels that have infection present):  No infection present  Other Findings:     Detailed Description of Procedure:   This is a 44-year-old male with a history of Devika-en-Y gastric bypass who done very well with significant weight loss.  He developed acute onset of left upper quadrant abdominal pain associated with some nausea.  We discussed risk benefits alternatives of diagnostic laparoscopy with concerns for internal hernia.  He agreed to proceed.  He was taken the operating placed by administered general anesthesia and intubated.  Once he was adequate state he was prepped and draped in normal sterile fashion timeout was performed confirm surgical site the patient's name.  He received IV antibiotics within 30 minutes of incision.  He had bilateral lower extremity compression devices placed prior to induction of anesthesia.  Initially made a stab incision at Guillen's point left upper quadrant inserted Veress needle confirmed in place with opening pressure less than 5 mmHg and insufflated 15 mmHg.  We then used an Optiview trocar to enter supraumbilical under direct visualization.  Upon entrance to the abdomen there did not appear to be any significant dilated bowel.  There was no evidence of perforated bowel.  Inserted

## 2025-02-25 NOTE — ANESTHESIA POSTPROCEDURE EVALUATION
Department of Anesthesiology  Postprocedure Note    Patient: James Hartman  MRN: [id number]  YOB: 1980  Date of evaluation: 2/25/2025    Procedure Summary       Date: 02/25/25 Room / Location: [address]    Anesthesia Start: 0842 Anesthesia Stop: 0948    Procedure: LAPAROSCOPY DIAGNOSTIC LYSIS OF ADHESIONS AND CLOSURE OF INTERNAL HERNIA Diagnosis:       LUQ abdominal pain      (LUQ abdominal pain [R10.12])    Surgeons: Fahad Loaiza MD Responsible Provider: George Moody MD    Anesthesia Type: General ASA Status: 3            Anesthesia Type: General    Orestes Phase I: Orestes Score: 10    Orestes Phase II: Orestes Score: 10    Anesthesia Post Evaluation    Patient location during evaluation: PACU  Patient participation: complete - patient participated  Level of consciousness: awake  Airway patency: patent  Nausea & Vomiting: no nausea and no vomiting  Cardiovascular status: hemodynamically stable  Respiratory status: acceptable  Hydration status: euvolemic  Pain management: adequate        No notable events documented.

## 2025-02-25 NOTE — H&P
James Hartman  2/25/2025  [facility]    Devika-en- Y Gastric Bypass  3 year Post-Operative Follow-up   HISTORY AND PHYSICAL     Chief complaint: LUQ pain, bloating        James Hartman is a 44 y.o. male who is 3 years  post Laparoscopic Devika-en-Y Gastric Bypass surgery.  Reports that he is doing good. He does report that he has been having issues at times keeping food and fluids down. Feels like there is a lump in his throat at times. He also reports that he has a lot of gas and is burping a lot.  He has a hard time eating chicken and other meats. Does not tolerate protein shakes. He is not having swallowing difficulty. Patient denies nausea and vomiting. Patient reports occasional gastric reflux symptoms, has been taking OTC omeprazole. Bowel movements are normal per patient. Patient is not taking fiber supplements.      Patient is compliant most of the time with the bariatric one a day MVI. He is meeting fluid recommendations of at least 64 ounces per day and is not meeting protein recommendations. He  is exercising:  lifting weights, walking .     Returns after EGD  POSTOPERATIVE DIAGNOSES:   (1) No Hiatal Hernia  (2) Mild grade A Esophagitis  (3) mild Gastritis  (4) 200 cc gastric pouch  (5) no marginal ulcer  (6) 2 cm anastomosis    Last Surgical Weight Loss:      1/24/2024     8:36 AM 1/10/2025     9:01 AM 2/7/2025     9:30 AM   Surgical Weight Loss Tracker   Consult Date 7/23/2021     Initial Height 6' 2\"     Initial Weight 434 lb     Initial BMI 55.72     Ideal Body Weight 197 lb     Surgery Date 12/14/2021     Pre-Surgical Height 6' 2\"     Pre-Surgical Weight 415 lb     Pre Surgery BMI 53.28     Weight to Lose 218 lb     Date 1/24/2024 1/10/2025 2/7/2025   Height 6' 2\" 6' 2\" 6' 2\"   Weight 188 lb 184 lb 189 lb   BMI 24.14 23.62 24.26   Weight Change -227 lb -4 lb 5 lb   Total Weight Change -227 lb -231 lb -226 lb   % EBWL 104% 106% 104%                Weight=83.5 kg (184 lb)

## 2025-02-25 NOTE — ANESTHESIA PRE PROCEDURE
Department of Anesthesiology  Preprocedure Note       Name:  James Hartman   Age:  44 y.o.  :  1980                                          MRN:  [id number]         Date:  2025      Surgeon: Surgeon(s):  Fahad Loaiza MD    Procedure: Procedure(s):  LAPAROSCOPY DIAGNOSTIC poss bowel resection poss repair internal hernia    Medications prior to admission:   Prior to Admission medications    Medication Sig Start Date End Date Taking? Authorizing Provider   omeprazole (PRILOSEC) 20 MG delayed release capsule TAKE 1 CAPSULE BY MOUTH EVERY DAY IN THE MORNING BEFORE BREAKFAST 2/3/25   Marisol Bhakta APRN - CNP   zinc gluconate 50 MG tablet Take 1 tablet by mouth three times a week 1/10/25 3/11/25  Marisol Bhakta APRN - CNP   Calcium Carbonate (CALCI-CHEW PO) Take 1 tablet by mouth 3 times daily    Shavonne York MD   Ferrous Sulfate (IRON PO) Take 1 tablet by mouth daily    Shavonne York MD   Multiple Vitamin (MVI, BARIATRIC ADVANTAGE MULTI-FORMULA, CHEW TAB) Take 1 tablet by mouth daily    Shavonne York MD   vitamin D (CHOLECALCIFEROL) 125 MCG (5000 UT) CAPS capsule Take 1 capsule by mouth daily    Shavonne York MD       Current medications:    Current Facility-Administered Medications   Medication Dose Route Frequency Provider Last Rate Last Admin    sodium chloride flush 0.9 % injection 5-40 mL  5-40 mL IntraVENous 2 times per day Shaheen Rangel APRN - CNP        sodium chloride flush 0.9 % injection 5-40 mL  5-40 mL IntraVENous PRN Shaheen Rangel APRN - CNP        0.9 % sodium chloride infusion   IntraVENous PRN Shaheen Rangel APRN - CNP        ceFAZolin (ANCEF) 2,000 mg in sterile water 20 mL IV syringe  2,000 mg IntraVENous On Call to OR Shaheen Rangel APRN - CNP        lactated ringers infusion   IntraVENous Continuous Liseth Hamlin DO 20 mL/hr at 25 0727 New Bag at 25 0727       Allergies:    Allergies   Allergen Reactions    Adhesive

## 2025-03-07 ENCOUNTER — OFFICE VISIT (OUTPATIENT)
Dept: BARIATRICS/WEIGHT MGMT | Age: 45
End: 2025-03-07

## 2025-03-07 VITALS
SYSTOLIC BLOOD PRESSURE: 122 MMHG | BODY MASS INDEX: 23.61 KG/M2 | WEIGHT: 184 LBS | DIASTOLIC BLOOD PRESSURE: 74 MMHG | HEART RATE: 74 BPM | HEIGHT: 74 IN

## 2025-03-07 DIAGNOSIS — K45.8 INTERNAL HERNIA: ICD-10-CM

## 2025-03-07 DIAGNOSIS — K91.2 MALNUTRITION FOLLOWING GASTROINTESTINAL SURGERY: Primary | ICD-10-CM

## 2025-03-07 ASSESSMENT — ENCOUNTER SYMPTOMS
DIARRHEA: 0
CONSTIPATION: 0
VOMITING: 0
WHEEZING: 0
COUGH: 0
SHORTNESS OF BREATH: 0
NAUSEA: 0

## 2025-03-07 NOTE — PATIENT INSTRUCTIONS
Please continue to take your vitamin and mineral supplements as instructed.      If you received a blood work prescription today for laboratory monitoring due prior to your next routine follow-up visit, please have this blood work obtained 10 to 14 days prior to your next visit.  It is important to fast for 12 hours prior to routine weight loss surgery blood work, EXCEPT for drinking water, to ensure accuracy of results.    Please report nausea, vomiting, abdominal pain, or any other problems you experience to your surgeon.  For problems related to weight loss surgery, it is best to go to [facility] Emergency Department and have your surgeon paged.    Last Surgical Weight Loss:      1/24/2024     8:36 AM 1/10/2025     9:01 AM 2/7/2025     9:30 AM 3/7/2025     1:20 PM   Surgical Weight Loss Tracker   Consult Date 7/23/2021      Initial Height 6' 2\"      Initial Weight 434 lb      Initial BMI 55.72      Ideal Body Weight 197 lb      Surgery Date 12/14/2021      Pre-Surgical Height 6' 2\"      Pre-Surgical Weight 415 lb      Pre Surgery BMI 53.28      Weight to Lose 218 lb      Date 1/24/2024 1/10/2025 2/7/2025 3/7/2025   Height 6' 2\" 6' 2\" 6' 2\" 6' 2\"   Weight 188 lb 184 lb 189 lb 184 lb   BMI 24.14 23.62 24.26 23.62   Weight Change -227 lb -4 lb 5 lb -5 lb   Total Weight Change -227 lb -231 lb -226 lb -231 lb   % EBWL 104% 106% 104% 106%

## 2025-03-07 NOTE — PROGRESS NOTES
Cardiovascular:      Rate and Rhythm: Normal rate and regular rhythm.   Pulmonary:      Effort: Pulmonary effort is normal.      Breath sounds: Normal breath sounds.   Abdominal:      General: Bowel sounds are normal.      Palpations: Abdomen is soft.      Comments: Incisions well healed, glue in tack   Musculoskeletal:         General: Normal range of motion.      Cervical back: Normal range of motion and neck supple.   Lymphadenopathy:      Cervical: No cervical adenopathy.   Neurological:      Mental Status: He is alert and oriented to person, place, and time.           Assessment/Plan:    1. Malnutrition following gastrointestinal surgery  Follow up as scheduled    2. Internal hernia  Doing well s/p internal hernia repair. Follow up as scheduled    Patient may return to work next week. Follow up as scheduled, sooner if needed. Patient verbalized understanding.       Marisol Bhakta, APRN - CNP

## 2025-07-30 DIAGNOSIS — R10.13 EPIGASTRIC PAIN: ICD-10-CM

## 2025-07-30 RX ORDER — OMEPRAZOLE 20 MG/1
20 CAPSULE, DELAYED RELEASE ORAL
Qty: 90 CAPSULE | Refills: 1 | Status: SHIPPED | OUTPATIENT
Start: 2025-07-30

## (undated) DEVICE — GLOVE ORANGE PI 7 1/2   MSG9075

## (undated) DEVICE — GOWN ISOLATN REG YEL M WT MULTIPLY SIDETIE LEV 2

## (undated) DEVICE — GOWN,SIRUS,NONRNF,SETINSLV,XL,20/CS: Brand: MEDLINE

## (undated) DEVICE — Device

## (undated) DEVICE — SCOPE DAVINCI XI 30 DEG W/CORD

## (undated) DEVICE — TROCAR: Brand: KII FIOS FIRST ENTRY

## (undated) DEVICE — AIRLIFE™ ADULT OXYGEN MASK VINYL, UNDER THE CHIN STYLE, 3 IN 1 MASK WITH SAFETY VENT, WITH 7 FEET (2.1 M) CRUSH RESISTANT OXYGEN TUBING: Brand: AIRLIFE™

## (undated) DEVICE — IRON INTERN

## (undated) DEVICE — 34" SINGLE PATIENT USE HOVERMATT BREATHABLE: Brand: SINGLE PATIENT USE HOVERMATT

## (undated) DEVICE — DOUBLE BASIN SET: Brand: MEDLINE INDUSTRIES, INC.

## (undated) DEVICE — TROCAR: Brand: KII SLEEVE

## (undated) DEVICE — ELECTRODE PT RET AD L9FT HI MOIST COND ADH HYDRGEL CORDED

## (undated) DEVICE — COOK ENDOSCOPIC CHOLANGIOGRAPHY SET: Brand: COOK

## (undated) DEVICE — BLOCK BITE 60FR CAREGUARD

## (undated) DEVICE — WARMER SCP LAP

## (undated) DEVICE — [HIGH FLOW INSUFFLATOR,  DO NOT USE IF PACKAGE IS DAMAGED,  KEEP DRY,  KEEP AWAY FROM SUNLIGHT,  PROTECT FROM HEAT AND RADIOACTIVE SOURCES.]: Brand: PNEUMOSURE

## (undated) DEVICE — GARMENT,MEDLINE,DVT,INT,CALF,MED, GEN2: Brand: MEDLINE

## (undated) DEVICE — Z DISCONTINUED USE 2272124 DRAPE SURG XL N INVASIVE 2 LAYR DISP

## (undated) DEVICE — CONTAINER SPEC COLL 960ML POLYPR TRIANG GRAD INTAKE/OUTPUT

## (undated) DEVICE — 4-PORT MANIFOLD: Brand: NEPTUNE 2

## (undated) DEVICE — COLUMN DRAPE

## (undated) DEVICE — KIT BEDSIDE REVITAL OX 500ML

## (undated) DEVICE — COVER,LIGHT HANDLE,FLX,1/PK: Brand: MEDLINE INDUSTRIES, INC.

## (undated) DEVICE — PITCHER PT 1200ML W HNDL CSR WRP

## (undated) DEVICE — FORCEPS BX L240CM JAW DIA2.8MM L CAP W/ NDL MIC MESH TOOTH

## (undated) DEVICE — NEEDLE SPNL 22GA L3.5IN BLK HUB S STL REG WALL FIT STYL W/

## (undated) DEVICE — LIQUIBAND RAPID ADHESIVE 36/CS 0.8ML: Brand: MEDLINE

## (undated) DEVICE — REDUCER: Brand: ENDOWRIST

## (undated) DEVICE — BLADE ES ELASTOMERIC COAT INSUL DURABLE BEND UPTO 90DEG

## (undated) DEVICE — SET ENDO INSTR LAPAROSCOPIC INCISIONAL

## (undated) DEVICE — MEDI-VAC NON-CONDUCTIVE SUCTION TUBING: Brand: CARDINAL HEALTH

## (undated) DEVICE — LAPAROSCOPIC SCISSORS: Brand: EPIX LAPAROSCOPIC SCISSORS

## (undated) DEVICE — SYNCHROSEAL: Brand: DA VINCI ENERGY

## (undated) DEVICE — MASK,FACE,MAXFLUIDPROTECT,SHIELD/ERLPS: Brand: MEDLINE

## (undated) DEVICE — LAPAROSCOPIC WIRE L HK 45 CM

## (undated) DEVICE — MEGA SUTURECUT ND: Brand: ENDOWRIST

## (undated) DEVICE — NDL CNTR 40CT FM MAG: Brand: MEDLINE INDUSTRIES, INC.

## (undated) DEVICE — LUBRICANT SURG JELLY ST BACTER TUBE 4.25OZ

## (undated) DEVICE — BLADELESS OBTURATOR, LONG: Brand: WECK VISTA

## (undated) DEVICE — SPONGE LAP W18XL18IN WHT COT 4 PLY FLD STRUNG RADPQ DISP ST 2 PER PACK

## (undated) DEVICE — STAPLER 60: Brand: SUREFORM

## (undated) DEVICE — MARKER,SKIN,WI/RULER AND LABELS: Brand: MEDLINE

## (undated) DEVICE — TIP-UP FENESTRATED GRASPER: Brand: ENDOWRIST

## (undated) DEVICE — SOLUTION IV 1000 ML 0.9 NACL INJ USP EXCEL PLAS CONTAINER

## (undated) DEVICE — TUBING, SUCTION, 1/4" X 10', STRAIGHT: Brand: MEDLINE

## (undated) DEVICE — VALVE SUCTION AIR H2O HYDR H2O JET CONN STRL ORCA POD + DISP

## (undated) DEVICE — TRAP,MUCUS SPECIMEN, 80CC: Brand: MEDLINE

## (undated) DEVICE — SUTURE ABSRB L6IN L37MM 0 GS-21 GRN 1/2 CIR TAPR PNT NDL VLOCL0306

## (undated) DEVICE — NEEDLE LAP VERES 14 GAX120 MM IN124] THE OR COMPANY]

## (undated) DEVICE — BLADE,STAINLESS-STEEL,11,STRL,DISPOSABLE: Brand: MEDLINE

## (undated) DEVICE — BASIC DOUBLE BASIN 2-LF: Brand: MEDLINE INDUSTRIES, INC.

## (undated) DEVICE — NEEDLE HYPO 25GA L1.5IN BLU POLYPR HUB S STL REG BVL STR

## (undated) DEVICE — SYRINGE 20ML LL S/C 50

## (undated) DEVICE — TOWEL,OR,DSP,ST,BLUE,STD,6/PK,12PK/CS: Brand: MEDLINE

## (undated) DEVICE — ELECTRO LUBE IS A SINGLE PATIENT USE DEVICE THAT IS INTENDED TO BE USED ON ELECTROSURGICAL ELECTRODES TO REDUCE STICKING.: Brand: KEY SURGICAL ELECTRO LUBE

## (undated) DEVICE — APPLICATOR MEDICATED 26 CC SOLUTION HI LT ORNG CHLORAPREP

## (undated) DEVICE — BLADELESS OBTURATOR: Brand: WECK VISTA

## (undated) DEVICE — SPONGE GZ 4IN 4IN 4 PLY N WVN AVANT

## (undated) DEVICE — PMI PTFE COATED LAPAROSCOPIC WIRE L-HOOK 44 CM: Brand: PMI

## (undated) DEVICE — SET INSUF TUBE HEAT ISO CONN DISP

## (undated) DEVICE — SET INST DAVINCI XI ACCESSORIES

## (undated) DEVICE — KENDALL 450 SERIES MONITORING FOAM ELECTRODE - RECTANGULAR SHAPE ( 3/PK): Brand: KENDALL

## (undated) DEVICE — ADAPTER CLEANING PORPOISE CLEANING

## (undated) DEVICE — STAPLER 60 RELOAD BLUE: Brand: SUREFORM

## (undated) DEVICE — CLOTH SURG PREP PREOPERATIVE CHLORHEXIDINE GLUC 2% READYPREP

## (undated) DEVICE — Device: Brand: INSTRUSAFE

## (undated) DEVICE — CONTAINER SPEC 480ML CLR POLYSTYR 10% NEUT BUFF FRMLN ZN

## (undated) DEVICE — PACK SURG LAP CHOLE CUSTOM

## (undated) DEVICE — 6 X 9  1.75MIL 4-WALL LABGUARD: Brand: MINIGRIP COMMERCIAL LLC

## (undated) DEVICE — ARM DRAPE

## (undated) DEVICE — SYRINGE MED 10ML TRNSLUC BRL PLUNG BLK MRK POLYPR CTRL

## (undated) DEVICE — STAPLER 60 RELOAD WHITE: Brand: SUREFORM

## (undated) DEVICE — SUTURE V-LOC 180 SZ 0 L9IN ABSRB GRN GS-21 L37MM 1/2 CIR VLOCL0346

## (undated) DEVICE — YANKAUER,BULB TIP,W/O VENT,RIGID,STERILE: Brand: MEDLINE

## (undated) DEVICE — INSUFFLATION NEEDLE TO ESTABLISH PNEUMOPERITONEUM.: Brand: INSUFFLATION NEEDLE

## (undated) DEVICE — WIPES SKIN CLOTH READYPREP 9 X 10.5 IN 2% CHLORHEX GLUCONATE CHG PREOP

## (undated) DEVICE — CADIERE FORCEPS: Brand: ENDOWRIST

## (undated) DEVICE — SOLUTION IV IRRIG POUR BRL 0.9% SODIUM CHL 2F7124

## (undated) DEVICE — SYRINGE MED 10ML LUERLOCK TIP W/O SFTY DISP

## (undated) DEVICE — VESSEL SEALER EXTEND: Brand: ENDOWRIST

## (undated) DEVICE — SUCTION IRRIGATOR: Brand: ENDOWRIST

## (undated) DEVICE — SKIN AFFIX SURG ADHESIVE 72/CS 0.55ML: Brand: MEDLINE

## (undated) DEVICE — MEDI-VAC YANKAUER SUCTION HANDLE W/BULBOUS TIP: Brand: CARDINAL HEALTH

## (undated) DEVICE — SEAL

## (undated) DEVICE — MICRO TIP WIPE: Brand: DEVON

## (undated) DEVICE — SEALER/DIVIDER LAP SHFT L44CM DIA5MM STR BLNT TIP JAW HND

## (undated) DEVICE — KIT,ANTI FOG,W/SPONGE & FLUID,SOFT PACK: Brand: MEDLINE

## (undated) DEVICE — PLUMEPORT ACTIV LAPAROSCOPIC SMOKE FILTRATION DEVICE: Brand: PLUMEPORT ACTIVE

## (undated) DEVICE — MEDIA CONTRAST ISOVUE GLASS VIALS 250 50ML

## (undated) DEVICE — DRAPE C ARM W41XL65IN UNIV W/ CLP AND RUBBERBAND

## (undated) DEVICE — CANNULA SEAL

## (undated) DEVICE — PMI PTFE COATED LAPAROSCOPIC WIRE L-HOOK 33 CM: Brand: PMI

## (undated) DEVICE — 20 ML SYRINGE REGULAR TIP: Brand: MONOJECT